# Patient Record
Sex: FEMALE | Race: WHITE | NOT HISPANIC OR LATINO | Employment: FULL TIME | URBAN - METROPOLITAN AREA
[De-identification: names, ages, dates, MRNs, and addresses within clinical notes are randomized per-mention and may not be internally consistent; named-entity substitution may affect disease eponyms.]

---

## 2017-06-03 RX ORDER — AZELASTINE HYDROCHLORIDE, FLUTICASONE PROPIONATE 137; 50 UG/1; UG/1
1 SPRAY, METERED NASAL 2 TIMES DAILY
COMMUNITY
Start: 2016-08-02 | End: 2018-05-18 | Stop reason: SDUPTHER

## 2017-06-03 RX ORDER — VENLAFAXINE HYDROCHLORIDE 150 MG/1
1 CAPSULE, EXTENDED RELEASE ORAL DAILY
COMMUNITY
Start: 2017-04-24 | End: 2017-06-05 | Stop reason: SDUPTHER

## 2017-06-03 RX ORDER — MONTELUKAST SODIUM 10 MG/1
1 TABLET ORAL DAILY
COMMUNITY
Start: 2017-03-23 | End: 2018-04-01 | Stop reason: SDUPTHER

## 2017-06-03 RX ORDER — VIT C/E/ZN/COPPR/LUTEIN/ZEAXAN 250MG-90MG
1 CAPSULE ORAL DAILY
COMMUNITY
End: 2021-10-13

## 2017-06-03 RX ORDER — LEVOTHYROXINE SODIUM 50 UG/1
1 TABLET ORAL DAILY
COMMUNITY
Start: 2017-04-19 | End: 2017-07-01 | Stop reason: SDUPTHER

## 2017-06-03 RX ORDER — LIOTHYRONINE SODIUM 25 UG/1
1 TABLET ORAL DAILY
COMMUNITY
Start: 2017-04-19 | End: 2017-06-22

## 2017-06-03 RX ORDER — VALACYCLOVIR HYDROCHLORIDE 500 MG/1
1 TABLET, FILM COATED ORAL
COMMUNITY
End: 2022-10-11 | Stop reason: SDUPTHER

## 2017-06-03 RX ORDER — ALBUTEROL SULFATE 90 UG/1
1 AEROSOL, METERED RESPIRATORY (INHALATION) EVERY 6 HOURS PRN
COMMUNITY
Start: 2015-06-30 | End: 2018-02-05 | Stop reason: SDUPTHER

## 2017-06-05 ENCOUNTER — CLINICAL SUPPORT (OUTPATIENT)
Dept: ALLERGY | Facility: CLINIC | Age: 47
End: 2017-06-05
Payer: COMMERCIAL

## 2017-06-05 ENCOUNTER — OFFICE VISIT (OUTPATIENT)
Dept: FAMILY MEDICINE | Facility: CLINIC | Age: 47
End: 2017-06-05
Payer: COMMERCIAL

## 2017-06-05 VITALS
SYSTOLIC BLOOD PRESSURE: 124 MMHG | HEART RATE: 90 BPM | OXYGEN SATURATION: 100 % | WEIGHT: 196 LBS | BODY MASS INDEX: 33.46 KG/M2 | HEIGHT: 64 IN | DIASTOLIC BLOOD PRESSURE: 80 MMHG

## 2017-06-05 VITALS
HEIGHT: 64 IN | BODY MASS INDEX: 33.48 KG/M2 | DIASTOLIC BLOOD PRESSURE: 84 MMHG | HEART RATE: 98 BPM | WEIGHT: 196.13 LBS | SYSTOLIC BLOOD PRESSURE: 118 MMHG

## 2017-06-05 DIAGNOSIS — J30.81 ALLERGIC RHINITIS DUE TO ANIMAL HAIR AND DANDER: Primary | ICD-10-CM

## 2017-06-05 DIAGNOSIS — E06.3 HASHIMOTO'S THYROIDITIS: ICD-10-CM

## 2017-06-05 DIAGNOSIS — F41.8 MIXED ANXIETY DEPRESSIVE DISORDER: Primary | ICD-10-CM

## 2017-06-05 PROBLEM — Z90.710 HISTORY OF HYSTERECTOMY: Status: ACTIVE | Noted: 2017-06-05

## 2017-06-05 PROBLEM — J45.909 ALLERGIC ASTHMA: Status: ACTIVE | Noted: 2017-06-05

## 2017-06-05 PROBLEM — J31.0 CHRONIC RHINITIS: Status: ACTIVE | Noted: 2017-06-05

## 2017-06-05 PROBLEM — E03.9 ACQUIRED HYPOTHYROIDISM: Status: RESOLVED | Noted: 2017-06-05 | Resolved: 2017-06-05

## 2017-06-05 PROBLEM — R68.82 REDUCED LIBIDO: Status: ACTIVE | Noted: 2017-06-05

## 2017-06-05 PROBLEM — J32.9 CHRONIC INFECTION OF SINUS: Status: ACTIVE | Noted: 2017-06-05

## 2017-06-05 PROBLEM — E78.5 HYPERLIPIDEMIA: Status: ACTIVE | Noted: 2017-06-05

## 2017-06-05 PROBLEM — E03.9 ACQUIRED HYPOTHYROIDISM: Status: ACTIVE | Noted: 2017-06-05

## 2017-06-05 PROBLEM — N20.0 CALCULUS OF KIDNEY: Status: ACTIVE | Noted: 2017-06-05

## 2017-06-05 PROBLEM — J45.20 INTERMITTENT ASTHMA: Status: ACTIVE | Noted: 2017-06-05

## 2017-06-05 PROBLEM — Z78.9 OTHER SPECIFIED HEALTH STATUS: Status: ACTIVE | Noted: 2017-06-05

## 2017-06-05 PROBLEM — J30.1 HAY FEVER: Status: ACTIVE | Noted: 2017-06-05

## 2017-06-05 PROCEDURE — 99499 UNLISTED E&M SERVICE: CPT | Mod: ,,, | Performed by: ALLERGY & IMMUNOLOGY

## 2017-06-05 PROCEDURE — 95117 IMMUNOTHERAPY INJECTIONS: CPT | Mod: ,,, | Performed by: ALLERGY & IMMUNOLOGY

## 2017-06-05 PROCEDURE — 99214 OFFICE O/P EST MOD 30 MIN: CPT | Mod: ,,, | Performed by: FAMILY MEDICINE

## 2017-06-05 RX ORDER — VENLAFAXINE HYDROCHLORIDE 150 MG/1
150 CAPSULE, EXTENDED RELEASE ORAL DAILY
Qty: 30 CAPSULE | Refills: 2 | Status: SHIPPED | OUTPATIENT
Start: 2017-06-05 | End: 2017-10-24 | Stop reason: SDUPTHER

## 2017-06-05 NOTE — PATIENT INSTRUCTIONS
Continue to take an antihistamine on the day of your allergy injection.      Please call if you have any concerns or questions.

## 2017-06-05 NOTE — PROGRESS NOTES
Pt presents for her monthly allergy immunothearpy. Tolerating injections. Perceives efficacy.     Rx Vial 1 0.5 mL 1:1 maintenance concentration  Rx Vial 2 0.5 mL 1:1 maintenance concentration        Pt observed in clinic for 30 mins and discharged in good condition.

## 2017-06-05 NOTE — PROGRESS NOTES
Subjective:       Patient ID:  Riddhi Thapa is a 46 y.o. female with multiple medical diagnoses as listed in the medical history and problem list that presents for Anxiety (f/u) and Thyroid Problem  .      Chief Complaint: Anxiety (f/u) and Thyroid Problem    Anxiety   Presents for follow-up visit. Symptoms include depressed mood, excessive worry and nervous/anxious behavior. Patient reports no chest pain, dizziness, nausea, shortness of breath or suicidal ideas. Symptoms occur most days. The severity of symptoms is mild. The quality of sleep is fair. Nighttime awakenings: one to two, occasional.     Compliance with medications is %.   Thyroid Problem   Presents for follow-up visit. Symptoms include anxiety and depressed mood. Patient reports no diarrhea. The symptoms have been stable.     Review of Systems   Constitutional: Negative for activity change, appetite change, chills and fever.   HENT: Negative.  Negative for facial swelling.    Eyes: Negative.  Negative for discharge.   Respiratory: Negative for apnea, cough and shortness of breath.    Cardiovascular: Negative.  Negative for chest pain.   Gastrointestinal: Negative for abdominal distention, diarrhea, nausea and vomiting.   Genitourinary: Negative for difficulty urinating and dyspareunia.   Musculoskeletal: Negative for arthralgias and myalgias.   Skin: Negative for color change and rash.   Neurological: Negative.  Negative for dizziness and headaches.   Psychiatric/Behavioral: Negative for agitation, dysphoric mood and suicidal ideas. The patient is nervous/anxious.        Past Medical History:   Diagnosis Date    Anxiety     Asthma     Depression     GERD (gastroesophageal reflux disease)     Hashimoto's thyroiditis     Hyperlipidemia     Hypothyroidism     Kidney stones      Past Surgical History:   Procedure Laterality Date    EXTRACORPOREAL SHOCK WAVE LITHOTRIPSY  2011    LITHOTRIPSY      SINUS SURGERY  2015    TUBAL LIGATION       Bi-Lat     Family History   Problem Relation Age of Onset    Eczema Mother     Thyroid disease Mother     Cancer Father      Renal    Eczema Father     COPD Maternal Grandmother     Diabetes Maternal Grandmother     Stroke Maternal Grandmother     Thyroid disease Maternal Grandmother     Asthma Neg Hx     Immunodeficiency Neg Hx     Rhinitis Neg Hx      Social History     Social History    Marital status:      Spouse name: N/A    Number of children: N/A    Years of education: N/A     Occupational History    Teacher      [employer]     Social History Main Topics    Smoking status: Never Smoker    Smokeless tobacco: Never Used    Alcohol use Yes      Comment: weekends only    Drug use: No    Sexual activity: Not Asked     Other Topics Concern    None     Social History Narrative    None     Current Outpatient Prescriptions   Medication Sig Dispense Refill    albuterol (PROAIR HFA) 90 mcg/actuation inhaler Inhale 1 Dose into the lungs every 6 (six) hours as needed.      azelastine-fluticasone (DYMISTA) 137-50 mcg/spray Spry nassal spray 1 spray by Nasal route 2 (two) times daily.      cetirizine (ZYRTEC) 10 mg Cap Take 1 capsule by mouth daily as needed.      cholecalciferol, vitamin D3, (VITAMIN D3) 1,000 unit capsule Take 1 capsule by mouth Daily.      dexlansoprazole (DEXILANT) 60 mg capsule Take 60 mg by mouth once daily.        levothyroxine (SYNTHROID) 50 MCG tablet Take 1 tablet by mouth Daily.      liothyronine (CYTOMEL) 25 MCG Tab Take 1 tablet by mouth Daily.      mometasone-formoterol (DULERA) 200-5 mcg/actuation inhaler Inhale 2 puffs into the lungs 2 (two) times daily as needed.      montelukast (SINGULAIR) 10 mg tablet Take 1 tablet by mouth Daily.      valacyclovir (VALTREX) 500 MG tablet Take 1 tablet by mouth as needed.      venlafaxine (EFFEXOR-XR) 150 MG Cp24 Take 1 capsule (150 mg total) by mouth Daily. 30 capsule 2     No current facility-administered  medications for this visit.      Review of patient's allergies indicates:   Allergen Reactions    Nsaids (non-steroidal anti-inflammatory drug)      causes upset stomach, has GERD     Objective:      Vitals:    06/05/17 1123   BP: 124/80   Pulse: 90     Physical Exam   Constitutional: She appears well-developed and well-nourished.   HENT:   Head: Normocephalic.   Eyes: Pupils are equal, round, and reactive to light. Right eye exhibits no discharge. Left eye exhibits no discharge. No scleral icterus.   Neck: No thyromegaly present.   Cardiovascular: Normal rate and regular rhythm.  Exam reveals no gallop and no friction rub.    No murmur heard.  Pulmonary/Chest: No respiratory distress. She has no wheezes. She has no rales. She exhibits no tenderness.   Abdominal: She exhibits no distension. There is no tenderness.   Musculoskeletal: She exhibits no tenderness.   Skin: No erythema.   Psychiatric: Her mood appears anxious.       Assessment:       1. Mixed anxiety depressive disorder    2. Hashimoto's thyroiditis        Plan:       Mixed anxiety depressive disorder  Comments:  refer to psychiatrist  Orders:  -     Ambulatory referral to Psychiatry  -     venlafaxine (EFFEXOR-XR) 150 MG Cp24; Take 1 capsule (150 mg total) by mouth Daily.  Dispense: 30 capsule; Refill: 2  -     TSH; Future; Expected date: 06/05/2017    Hashimoto's thyroiditis  -     TSH; Future; Expected date: 06/05/2017      Return in about 6 months (around 12/5/2017) for anxiety.      6/5/2017 Viri Wayne M.D.

## 2017-06-22 ENCOUNTER — TELEPHONE (OUTPATIENT)
Dept: FAMILY MEDICINE | Facility: CLINIC | Age: 47
End: 2017-06-22

## 2017-06-22 NOTE — TELEPHONE ENCOUNTER
----- Message from Viri Wayne MD sent at 6/22/2017  4:33 PM CDT -----  Please let pt know that her TSH low  ( 0.02) with normal T4.  Please advise pt to take half pill of 25 mcg of Cytomel ( not one pill) .  Thanks.  Repeat TSH in 2 months.

## 2017-07-03 RX ORDER — LEVOTHYROXINE SODIUM 50 UG/1
TABLET ORAL
Qty: 30 TABLET | Refills: 5 | Status: SHIPPED | OUTPATIENT
Start: 2017-07-03 | End: 2017-08-29 | Stop reason: SDUPTHER

## 2017-07-10 ENCOUNTER — CLINICAL SUPPORT (OUTPATIENT)
Dept: ALLERGY | Facility: CLINIC | Age: 47
End: 2017-07-10
Payer: COMMERCIAL

## 2017-07-10 VITALS — OXYGEN SATURATION: 97 % | SYSTOLIC BLOOD PRESSURE: 124 MMHG | HEART RATE: 90 BPM | DIASTOLIC BLOOD PRESSURE: 70 MMHG

## 2017-07-10 DIAGNOSIS — J45.40 ALLERGIC ASTHMA, MODERATE PERSISTENT, UNCOMPLICATED: ICD-10-CM

## 2017-07-10 DIAGNOSIS — J30.81 CHRONIC ALLERGIC RHINITIS DUE TO ANIMAL HAIR AND DANDER: Primary | ICD-10-CM

## 2017-07-10 PROCEDURE — 95117 IMMUNOTHERAPY INJECTIONS: CPT | Mod: ,,, | Performed by: ALLERGY & IMMUNOLOGY

## 2017-07-10 PROCEDURE — 99499 UNLISTED E&M SERVICE: CPT | Mod: ,,, | Performed by: ALLERGY & IMMUNOLOGY

## 2017-07-12 NOTE — PROGRESS NOTES
Immunotherapy Note: Allergy Shot      Subjective:       Patient ID: Riddhi Thapa is a 46 y.o. female presents for monthly immunotherapy  Tolerated last injection  No New Medications  Beta Blockers: not on beta blocker  Reaction with last injection?: No  If female, are you pregnant?: No  Are you on any beta blockers?: No  Has the vial ?: No    Are you ill today?: No  Asthma exacerbation or symptoms: No  Do you have a fever today?: No    Peak Flow: 500    Expiration Date #1: 17  Vial Concentration: 1:1 v/v (RED)  Dose (mL) #1: 0.5 mL  Location: Right upper arm  Given By: rw    Expiration Date #2: 17  Vial Concentration: 1:1 v/v (RED)  Dose (mL) #2: 0.5 mL  Location: Left upper arm  Given By : terry                         Objective:     Riddhi Thapa observed for 30 minutes and discharged in good condition        Discussion:     Pt understands the current recommendation. All questions answered to the best of my ability. Continue current management plan.          Electronically signed by Samara Jones    Allergy/Imunology  [facility]  [place] [address]  [place] [address]   Office [phone]

## 2017-08-08 ENCOUNTER — CLINICAL SUPPORT (OUTPATIENT)
Dept: ALLERGY | Facility: CLINIC | Age: 47
End: 2017-08-08
Payer: COMMERCIAL

## 2017-08-08 VITALS — DIASTOLIC BLOOD PRESSURE: 70 MMHG | SYSTOLIC BLOOD PRESSURE: 120 MMHG | HEART RATE: 86 BPM | OXYGEN SATURATION: 99 %

## 2017-08-08 DIAGNOSIS — J30.81 CHRONIC ALLERGIC RHINITIS DUE TO ANIMAL HAIR AND DANDER: Primary | ICD-10-CM

## 2017-08-08 PROCEDURE — 95117 IMMUNOTHERAPY INJECTIONS: CPT | Mod: ,,, | Performed by: ALLERGY & IMMUNOLOGY

## 2017-08-08 PROCEDURE — 99499 UNLISTED E&M SERVICE: CPT | Mod: ,,, | Performed by: ALLERGY & IMMUNOLOGY

## 2017-08-10 RX ORDER — LIOTHYRONINE SODIUM 25 UG/1
TABLET ORAL
Qty: 30 TABLET | Refills: 4 | Status: SHIPPED | OUTPATIENT
Start: 2017-08-10 | End: 2017-08-29 | Stop reason: DRUGHIGH

## 2017-08-23 LAB — TSH SERPL-ACNC: 0.22 MIU/L

## 2017-08-29 ENCOUNTER — OFFICE VISIT (OUTPATIENT)
Dept: FAMILY MEDICINE | Facility: CLINIC | Age: 47
End: 2017-08-29
Payer: COMMERCIAL

## 2017-08-29 VITALS
HEIGHT: 64 IN | WEIGHT: 197.38 LBS | TEMPERATURE: 98 F | RESPIRATION RATE: 20 BRPM | HEART RATE: 92 BPM | BODY MASS INDEX: 33.7 KG/M2 | OXYGEN SATURATION: 97 %

## 2017-08-29 DIAGNOSIS — E06.3 HASHIMOTO'S THYROIDITIS: Primary | ICD-10-CM

## 2017-08-29 PROCEDURE — 99214 OFFICE O/P EST MOD 30 MIN: CPT | Mod: ,,, | Performed by: NURSE PRACTITIONER

## 2017-08-29 PROCEDURE — 3008F BODY MASS INDEX DOCD: CPT | Mod: ,,, | Performed by: NURSE PRACTITIONER

## 2017-08-29 RX ORDER — LEVOTHYROXINE SODIUM 50 UG/1
50 TABLET ORAL
Qty: 30 TABLET | Refills: 5 | Status: SHIPPED | OUTPATIENT
Start: 2017-08-29 | End: 2018-02-27 | Stop reason: SDUPTHER

## 2017-08-29 NOTE — PROGRESS NOTES
Subjective:       Patient ID: Riddhi Thapa is a 47 y.o. female.    Chief Complaint: Medication Refill and Thyroid Problem    Thyroid Problem   Presents for follow-up visit. Symptoms include anxiety, dry skin, hair loss and weight gain. Patient reports no cold intolerance, constipation, diaphoresis, diarrhea, fatigue, heat intolerance or palpitations.     Review of Systems   Constitutional: Positive for weight gain. Negative for appetite change, chills, diaphoresis, fatigue and fever.   HENT: Negative for congestion, ear pain, hearing loss, sore throat and trouble swallowing.    Eyes: Negative for photophobia, pain and visual disturbance.   Respiratory: Negative for cough, chest tightness and shortness of breath.    Cardiovascular: Negative for chest pain, palpitations and leg swelling.   Gastrointestinal: Negative for abdominal pain, blood in stool, constipation, diarrhea, nausea and vomiting.   Endocrine: Negative for cold intolerance and heat intolerance.   Genitourinary: Negative for difficulty urinating, flank pain, pelvic pain and vaginal pain.   Musculoskeletal: Negative for arthralgias and myalgias.   Skin: Negative for rash.   Allergic/Immunologic: Negative for immunocompromised state.   Neurological: Negative for dizziness, weakness, light-headedness and headaches.   Hematological: Negative for adenopathy. Does not bruise/bleed easily.   Psychiatric/Behavioral: Negative for confusion, self-injury and suicidal ideas. The patient is nervous/anxious.        Past Medical History:   Diagnosis Date    Anxiety     Asthma     Depression     GERD (gastroesophageal reflux disease)     Hashimoto's thyroiditis     Hyperlipidemia     Hypothyroidism     Kidney stones        Past Surgical History:   Procedure Laterality Date    EXTRACORPOREAL SHOCK WAVE LITHOTRIPSY  2011    LITHOTRIPSY      SINUS SURGERY  2015    TUBAL LIGATION      Bi-Lat       Family History   Problem Relation Age of Onset    Eczema  Mother     Thyroid disease Mother     Cancer Father      Renal    Eczema Father     COPD Maternal Grandmother     Diabetes Maternal Grandmother     Stroke Maternal Grandmother     Thyroid disease Maternal Grandmother     Asthma Neg Hx     Immunodeficiency Neg Hx     Rhinitis Neg Hx        Social History     Social History    Marital status:      Spouse name: N/A    Number of children: N/A    Years of education: N/A     Occupational History    Teacher      [employer]     Social History Main Topics    Smoking status: Never Smoker    Smokeless tobacco: Never Used    Alcohol use Yes      Comment: weekends only    Drug use: No    Sexual activity: Not on file     Other Topics Concern    Not on file     Social History Narrative    No narrative on file       Current Outpatient Prescriptions   Medication Sig Dispense Refill    albuterol (PROAIR HFA) 90 mcg/actuation inhaler Inhale 1 Dose into the lungs every 6 (six) hours as needed.      azelastine-fluticasone (DYMISTA) 137-50 mcg/spray Spry nassal spray 1 spray by Nasal route 2 (two) times daily.      cetirizine (ZYRTEC) 10 mg Cap Take 1 capsule by mouth daily as needed.      cholecalciferol, vitamin D3, (VITAMIN D3) 1,000 unit capsule Take 1 capsule by mouth Daily.      dexlansoprazole (DEXILANT) 60 mg capsule Take 60 mg by mouth once daily.        levothyroxine (SYNTHROID) 50 MCG tablet Take 1 tablet (50 mcg total) by mouth before breakfast. 30 tablet 5    mometasone-formoterol (DULERA) 200-5 mcg/actuation inhaler Inhale 2 puffs into the lungs 2 (two) times daily as needed.      montelukast (SINGULAIR) 10 mg tablet Take 1 tablet by mouth Daily.      valacyclovir (VALTREX) 500 MG tablet Take 1 tablet by mouth as needed.      venlafaxine (EFFEXOR-XR) 150 MG Cp24 Take 1 capsule (150 mg total) by mouth Daily. 30 capsule 2     Current Facility-Administered Medications   Medication Dose Route Frequency Provider Last Rate Last Dose     Allergy Mix   Subcutaneous Q28 Days Samara Jones MD           Review of patient's allergies indicates:   Allergen Reactions    Nsaids (non-steroidal anti-inflammatory drug)      causes upset stomach, has GERD       Objective:      Physical Exam   Constitutional: She is oriented to person, place, and time. She appears well-developed and well-nourished. She is cooperative. No distress.   HENT:   Head: Normocephalic and atraumatic.   Right Ear: Tympanic membrane normal.   Left Ear: Tympanic membrane normal.   Eyes: Conjunctivae, EOM and lids are normal. Pupils are equal, round, and reactive to light. Lids are everted and swept, no foreign bodies found. Right pupil is round and reactive. Left pupil is round and reactive.   Neck: Trachea normal and normal range of motion. Neck supple. No JVD present. Carotid bruit is not present. No thyroid mass and no thyromegaly present.   Cardiovascular: Normal rate, regular rhythm, S1 normal, S2 normal, normal heart sounds and intact distal pulses.    Pulmonary/Chest: Breath sounds normal.   Abdominal: Soft. Bowel sounds are normal. There is no rigidity and no guarding.   Musculoskeletal: Normal range of motion.   Lymphadenopathy:     She has no cervical adenopathy.     She has no axillary adenopathy.   Neurological: She is alert and oriented to person, place, and time.   Skin: Skin is warm and dry.   Psychiatric: She has a normal mood and affect. Her behavior is normal. Judgment and thought content normal.   Nursing note and vitals reviewed.      Assessment:       1. Hashimoto's thyroiditis        Plan:     Hashimoto's thyroiditis - TSH 0.22.  Over medicated.  Stop Cytomel and continue Synthroid - repeat labs in 1 month.    -     TSH; Future; Expected date: 08/29/2017  -     T4, free; Future; Expected date: 08/29/2017  -     T3, free; Future; Expected date: 08/29/2017  -     levothyroxine (SYNTHROID) 50 MCG tablet; Take 1 tablet (50 mcg total) by mouth before breakfast.   Dispense: 30 tablet; Refill: 5   F/U as scheduled

## 2017-08-29 NOTE — PATIENT INSTRUCTIONS
Thyroid Antibody  Does this test have other names?  TPO Abs, Tg Abs, TSH-Rs Abs  What is this test?  This test measures the amount of thyroid antibodies in your blood. The test can help find out whether you have a problem with your thyroid.  Your thyroid is a butterfly-shaped gland near the base of your throat above your collarbone. The thyroid makes two hormones, T3 and T4. These hormones affect your energy levels, mood, weight, and other important aspects of your health.  In some people, the immune system makes antibodies that affect the thyroid gland, causing health problems. These antibodies may target:  · Thyroid peroxidase (TPO). This can lead to a condition called Hashimoto's thyroiditis. This condition causes an underactive thyroid (hypothyroidism).  · Thyroglobulin (Tg). This substance in the thyroid plays a role in T3 and T4 production. Almost everyone with Hashimoto's thyroiditis has high levels of antibodies against TPO and Tg.  · Thyroid-stimulating hormone (TSH) receptor. This can cause a condition called Graves disease, leading to overactive thyroid (hyperthyroidism).  Why do I need this test?  You might have this test if your healthcare provider suspects that you have Graves disease or Hashimoto's thyroiditis.  Symptoms of Graves disease include:  · Bulging eyes  · Eye symptoms like irritation, pressure, light sensitivity, double vision, and trouble moving eyes  · Irritability  · Nervousness  · Weight loss  · Extreme tiredness  · Muscle weakness  · Low tolerance for heat  · Trouble sleeping  · Tremors in the hands  · Irregular heartbeat  · Diarrhea  · Redness and swelling on shins  · Swollen thyroid, called goiter  Symptoms of Hashimoto's thyroiditis include:  · Goiter that shrinks over time  · Extreme tiredness  · Low tolerance for cold  · Weight gain  · Body and joint pain  · Hair loss  · Constipation  · Irregular menstrual periods  · Depression  · Memory problems  · Slower heart rate  What other  tests might I have along with this test?  Your healthcare provider may order other tests, including:  · Measurement of TSH, which is made by your pituitary gland. TSH regulates how much thyroid hormone your thyroid makes.  · Measurement of free T4  · Measurement of free T3  · Radioactive iodine uptake test and thyroid scan, for Graves disease  What do my test results mean?  Many things may affect your lab test results. These include the method each lab uses to do the test. Even if your test results are different from the normal value, you may not have a problem. To learn what the results mean for you, talk with your healthcare provider.  Normal results are negative, meaning that no antibodies against TPO, Tg, or TSH were found. You likely don't have a problem with your thyroid.  If your results show antibodies against TPO or Tg, it means you may have Hashimoto's thyroiditis.  If your results show antibodies against TSH receptor, it means you may have Graves disease.  People with type 1 diabetes or certain autoimmune diseases, and pregnant women are more likely to have antibodies against the thyroid.  How is this test done?  The test requires a blood sample, which is drawn through a needle from a vein in your arm.  Does this test pose any risks?  Taking a blood sample with a needle carries risks that include bleeding, infection, bruising, or feeling dizzy. When the needle pricks your arm, you may feel a slight stinging sensation or pain. Afterward, the site may be slightly sore.  What might affect my test results?  Certain medicines can affect your results. Being pregnant can affect your results.  How do I get ready for this test?  You don't need to prepare for this test. But be sure your healthcare provider knows about all medicines, herbs, vitamins, and supplements you are taking. This includes medicines that don't need a prescription and any illicit drugs you may use.      Date Last Reviewed: 11/4/2015  ©  3872-0439 [employer]. [address] [place] [address], [place] [address]. All rights reserved. This information is not intended as a substitute for professional medical care. Always follow your healthcare professional's instructions.

## 2017-09-27 ENCOUNTER — PROCEDURE VISIT (OUTPATIENT)
Dept: ALLERGY | Facility: CLINIC | Age: 47
End: 2017-09-27
Payer: COMMERCIAL

## 2017-09-27 VITALS
DIASTOLIC BLOOD PRESSURE: 82 MMHG | WEIGHT: 200.31 LBS | BODY MASS INDEX: 34.38 KG/M2 | SYSTOLIC BLOOD PRESSURE: 124 MMHG

## 2017-09-27 DIAGNOSIS — J30.81 CHRONIC ALLERGIC RHINITIS DUE TO ANIMAL HAIR AND DANDER: Primary | ICD-10-CM

## 2017-09-27 PROCEDURE — 95117 IMMUNOTHERAPY INJECTIONS: CPT | Mod: ,,, | Performed by: ALLERGY & IMMUNOLOGY

## 2017-10-10 LAB
T3FREE SERPL-MCNC: 2.3 PG/ML (ref 2.3–4.2)
T4 FREE SERPL-MCNC: 1 NG/DL (ref 0.8–1.8)
TSH SERPL-ACNC: 1.97 MIU/L

## 2017-10-11 ENCOUNTER — TELEPHONE (OUTPATIENT)
Dept: FAMILY MEDICINE | Facility: CLINIC | Age: 47
End: 2017-10-11

## 2017-10-11 DIAGNOSIS — E03.9 HYPOTHYROIDISM, UNSPECIFIED TYPE: Primary | ICD-10-CM

## 2017-10-11 NOTE — TELEPHONE ENCOUNTER
Pt wants a referral to Dr. Ron.  She will make an appt with Santo to get established with her as a patient later.

## 2017-10-24 DIAGNOSIS — F41.8 MIXED ANXIETY DEPRESSIVE DISORDER: ICD-10-CM

## 2017-10-24 RX ORDER — VENLAFAXINE HYDROCHLORIDE 150 MG/1
150 CAPSULE, EXTENDED RELEASE ORAL DAILY
Qty: 30 CAPSULE | Refills: 2 | Status: SHIPPED | OUTPATIENT
Start: 2017-10-24 | End: 2018-02-27 | Stop reason: SDUPTHER

## 2017-10-30 ENCOUNTER — CLINICAL SUPPORT (OUTPATIENT)
Dept: ALLERGY | Facility: CLINIC | Age: 47
End: 2017-10-30
Payer: COMMERCIAL

## 2017-10-30 VITALS — SYSTOLIC BLOOD PRESSURE: 128 MMHG | DIASTOLIC BLOOD PRESSURE: 80 MMHG

## 2017-10-30 DIAGNOSIS — J30.81 CHRONIC ALLERGIC RHINITIS DUE TO ANIMAL HAIR AND DANDER: Primary | ICD-10-CM

## 2017-10-30 PROCEDURE — 99499 UNLISTED E&M SERVICE: CPT | Mod: ,,, | Performed by: ALLERGY & IMMUNOLOGY

## 2017-10-30 PROCEDURE — 95117 IMMUNOTHERAPY INJECTIONS: CPT | Mod: ,,, | Performed by: ALLERGY & IMMUNOLOGY

## 2017-10-31 NOTE — PROGRESS NOTES
Immunotherapy Note: Allergy Shot      Subjective:       Patient ID: Riddhi Thapa is a 47 y.o. female presents for monthly immunotherapy  Tolerated last injection  No New Medications  Beta Blockers: not on beta blocker    Reaction with last injection?: No  If female, are you pregnant?: No  Are you on any beta blockers?: No  Has the vial ?: No    Are you ill today?: No  Asthma exacerbation or symptoms: No  Do you have a fever today?: No    Peak Flow: 470    Expiration Date #1: 18  Vial Concentration: 1:1 v/v (RED)  Dose (mL) #1: 0.5 mL  Location: Right upper arm  Given By: rw    Expiration Date #2: 18  Vial Concentration: 1:1 v/v (RED)  Dose (mL) #2: 0.5 mL  Location: Left upper arm  Given By : rw                           Objective:     Riddhi Thapa observed for 30 minutes and discharged in good condition        Discussion:     Pt understands the current recommendation. All questions answered to the best of my ability. Continue current management plan.      Electronically signed by Samara Jones    Allergy/Imunology  [facility]  [place] [address] [address]  [place] [address]   Office [phone]

## 2017-11-14 NOTE — PROCEDURES
Procedures     Immunotherapy Note: Allergy Shot      Subjective:       Patient ID: Riddhi Thapa is a 47 y.o. female presents for monthly immunotherapy  Tolerated last injection  No New Medications  Beta Blockers: not on beta blocker    Reaction with last injection?: No  If female, are you pregnant?: No  Are you on any beta blockers?: No  Has the vial ?: No    Are you ill today?: No  Asthma exacerbation or symptoms: No  Do you have a fever today?: No    Peak Flow: 410    Expiration Date #1: 18  Vial Concentration: 1:1 v/v (RED)  Dose (mL) #1: 0.5 mL  Location: Right upper arm  Given By: sd    Expiration Date #2: 18  Vial Concentration: 1:1 v/v (RED)  Dose (mL) #2: 0.5 mL  Location: Left upper arm  Given By : sd                           Objective:     Riddhi Thapa observed for 30 minutes and discharged in good condition        Discussion:     Pt understands the current recommendation. All questions answered to the best of my ability. Continue current management plan.      Electronically signed by Samara Jones    Allergy/Imunology  [facility]  [place] [address] [facility]  [place] [address]   Office [phone]

## 2017-12-19 ENCOUNTER — TELEPHONE (OUTPATIENT)
Dept: PULMONOLOGY | Facility: CLINIC | Age: 47
End: 2017-12-19

## 2017-12-19 NOTE — TELEPHONE ENCOUNTER
Son tested + for influenza B this morning at pediatrician. Asking if she should take prophylactic Tamiflu. Dr Jones agreed, Tamiflu 75mg #10 1po qd phoned to [facility]

## 2018-01-11 ENCOUNTER — CLINICAL SUPPORT (OUTPATIENT)
Dept: ALLERGY | Facility: CLINIC | Age: 48
End: 2018-01-11
Payer: COMMERCIAL

## 2018-01-11 VITALS
WEIGHT: 200.38 LBS | BODY MASS INDEX: 34.21 KG/M2 | DIASTOLIC BLOOD PRESSURE: 78 MMHG | SYSTOLIC BLOOD PRESSURE: 116 MMHG | HEIGHT: 64 IN

## 2018-01-11 DIAGNOSIS — J30.81 CHRONIC ALLERGIC RHINITIS DUE TO ANIMAL HAIR AND DANDER: Primary | ICD-10-CM

## 2018-01-11 PROCEDURE — 99499 UNLISTED E&M SERVICE: CPT | Mod: ,,, | Performed by: ALLERGY & IMMUNOLOGY

## 2018-01-11 PROCEDURE — 95117 IMMUNOTHERAPY INJECTIONS: CPT | Mod: ,,, | Performed by: ALLERGY & IMMUNOLOGY

## 2018-01-11 NOTE — PROGRESS NOTES
Immunotherapy Note: Allergy Shot      Subjective:       Patient ID: Riddhi Thapa is a 47 y.o. female presents for monthly immunotherapy  Tolerated last injection  No New Medications  Beta Blockers: not on beta blocker    Reaction with last injection?: No  If female, are you pregnant?: No  Are you on any beta blockers?: No  Has the vial ?: No    Are you ill today?: No  Asthma exacerbation or symptoms: No  Do you have a fever today?: No    Peak Flow: 450    Expiration Date #1: 18  Vial Concentration: 1:1 v/v (RED)  Dose (mL) #1: 0.5 mL  Location: Right upper arm  Given By: NATAN    Expiration Date #2: 18  Vial Concentration: 1:1 v/v (RED)  Dose (mL) #2: 0.5 mL  Location: Left upper arm  Given By : NATAN                           Objective:     Riddhi Thapa observed for 30 minutes and discharged in good condition        Discussion:     Pt understands the current recommendation. All questions answered to the best of my ability. Continue current management plan.      Electronically signed by Samara Jones    Allergy/Imunology  [facility]  [place] [address] [facility]  [place] [address]   Office [phone]

## 2018-01-22 ENCOUNTER — TELEPHONE (OUTPATIENT)
Dept: FAMILY MEDICINE | Facility: CLINIC | Age: 48
End: 2018-01-22

## 2018-01-22 DIAGNOSIS — E06.3 HASHIMOTO'S THYROIDITIS: ICD-10-CM

## 2018-01-22 DIAGNOSIS — K21.9 GASTROESOPHAGEAL REFLUX DISEASE, ESOPHAGITIS PRESENCE NOT SPECIFIED: ICD-10-CM

## 2018-01-22 DIAGNOSIS — F41.8 MIXED ANXIETY DEPRESSIVE DISORDER: Primary | ICD-10-CM

## 2018-01-22 DIAGNOSIS — E78.5 HYPERLIPIDEMIA, UNSPECIFIED HYPERLIPIDEMIA TYPE: ICD-10-CM

## 2018-01-22 NOTE — TELEPHONE ENCOUNTER
----- Message from JEANNE Reid sent at 1/22/2018 10:12 AM CST -----  Regarding: Labs  I can order.  Where would she like these sent? [facility]?      ----- Message -----  From: Claribel Waggoner LPN  Sent: 1/22/2018  10:01 AM  To: JEANNE Reid        ----- Message -----  From: Carol Ha  Sent: 1/22/2018   9:43 AM  To: Kate [facility]ura Staff    Patient states she is former [facility] patient and wants to know when she comes to see Sv[facility] should she do lab work before? Please call to advise.

## 2018-02-05 RX ORDER — MOMETASONE FUROATE AND FORMOTEROL FUMARATE DIHYDRATE 200; 5 UG/1; UG/1
AEROSOL RESPIRATORY (INHALATION)
Qty: 13 G | Refills: 10 | Status: SHIPPED | OUTPATIENT
Start: 2018-02-05 | End: 2018-02-27

## 2018-02-05 RX ORDER — ALBUTEROL SULFATE 90 UG/1
2 AEROSOL, METERED RESPIRATORY (INHALATION) EVERY 6 HOURS PRN
Qty: 8 G | Refills: 2 | Status: SHIPPED | OUTPATIENT
Start: 2018-02-05 | End: 2019-07-30 | Stop reason: SDUPTHER

## 2018-02-08 ENCOUNTER — CLINICAL SUPPORT (OUTPATIENT)
Dept: ALLERGY | Facility: CLINIC | Age: 48
End: 2018-02-08
Payer: COMMERCIAL

## 2018-02-08 VITALS — DIASTOLIC BLOOD PRESSURE: 78 MMHG | SYSTOLIC BLOOD PRESSURE: 118 MMHG

## 2018-02-08 DIAGNOSIS — J30.81 CHRONIC ALLERGIC RHINITIS DUE TO ANIMAL HAIR AND DANDER: Primary | ICD-10-CM

## 2018-02-08 PROCEDURE — 95117 IMMUNOTHERAPY INJECTIONS: CPT | Mod: ,,, | Performed by: ALLERGY & IMMUNOLOGY

## 2018-02-08 PROCEDURE — 99499 UNLISTED E&M SERVICE: CPT | Mod: ,,, | Performed by: ALLERGY & IMMUNOLOGY

## 2018-02-08 NOTE — PROGRESS NOTES
Immunotherapy Note: Allergy Shot      Subjective:       Patient ID: Riddhi Thapa is a 47 y.o. female presents for monthly immunotherapy  Tolerated last injection  No New Medications  Beta Blockers: not on beta blocker           Reaction with last injection?: No  If female, are you pregnant?: No  Are you on any beta blockers?: No  Has the vial ?: No     Are you ill today?: No  Asthma exacerbation or symptoms: No  Do you have a fever today?: No     Peak Flow: 450     Expiration Date #1: 18  Vial Concentration: 1:1 v/v (RED)  Dose (mL) #1: 0.5 mL  Location: Right upper arm  Given By: NATAN     Expiration Date #2: 18  Vial Concentration: 1:1 v/v (RED)  Dose (mL) #2: 0.5 mL  Location: Left upper arm  Given By : NATAN                                          Objective:     Riddhi Thapa observed for 30 minutes and discharged in good condition        Discussion:     Pt understands the current recommendation. All questions answered to the best of my ability. Continue current management plan.      Electronically signed by Samara Jones    Allergy/Imunology  [facility]  [place] [address] [address] [facility]. [address]  [place] [address]   Office [phone]

## 2018-02-17 LAB
ALBUMIN SERPL-MCNC: 4.4 G/DL (ref 3.6–5.1)
ALBUMIN/GLOB SERPL: 1.6 (CALC) (ref 1–2.5)
ALP SERPL-CCNC: 89 U/L (ref 33–115)
ALT SERPL-CCNC: 14 U/L (ref 6–29)
AST SERPL-CCNC: 14 U/L (ref 10–35)
BASOPHILS # BLD AUTO: 52 CELLS/UL (ref 0–200)
BASOPHILS NFR BLD AUTO: 0.6 %
BILIRUB SERPL-MCNC: 0.5 MG/DL (ref 0.2–1.2)
BUN SERPL-MCNC: 11 MG/DL (ref 7–25)
BUN/CREAT SERPL: NORMAL (CALC) (ref 6–22)
CALCIUM SERPL-MCNC: 9.3 MG/DL (ref 8.6–10.2)
CHLORIDE SERPL-SCNC: 102 MMOL/L (ref 98–110)
CHOLEST SERPL-MCNC: 216 MG/DL
CHOLEST/HDLC SERPL: 3.3 (CALC)
CO2 SERPL-SCNC: 27 MMOL/L (ref 20–31)
CREAT SERPL-MCNC: 0.78 MG/DL (ref 0.5–1.1)
EOSINOPHIL # BLD AUTO: 301 CELLS/UL (ref 15–500)
EOSINOPHIL NFR BLD AUTO: 3.5 %
ERYTHROCYTE [DISTWIDTH] IN BLOOD BY AUTOMATED COUNT: 12.1 % (ref 11–15)
GFR SERPL CREATININE-BSD FRML MDRD: 92 ML/MIN/1.73M2
GLOBULIN SER CALC-MCNC: 2.7 G/DL (CALC) (ref 1.9–3.7)
GLUCOSE SERPL-MCNC: 89 MG/DL (ref 65–99)
HCT VFR BLD AUTO: 43 % (ref 35–45)
HDLC SERPL-MCNC: 65 MG/DL
HGB BLD-MCNC: 14.2 G/DL (ref 11.7–15.5)
LDLC SERPL CALC-MCNC: 124 MG/DL (CALC)
LYMPHOCYTES # BLD AUTO: 2460 CELLS/UL (ref 850–3900)
LYMPHOCYTES NFR BLD AUTO: 28.6 %
MCH RBC QN AUTO: 31.1 PG (ref 27–33)
MCHC RBC AUTO-ENTMCNC: 33 G/DL (ref 32–36)
MCV RBC AUTO: 94.1 FL (ref 80–100)
MONOCYTES # BLD AUTO: 550 CELLS/UL (ref 200–950)
MONOCYTES NFR BLD AUTO: 6.4 %
NEUTROPHILS # BLD AUTO: 5237 CELLS/UL (ref 1500–7800)
NEUTROPHILS NFR BLD AUTO: 60.9 %
NONHDLC SERPL-MCNC: 151 MG/DL (CALC)
PLATELET # BLD AUTO: 320 THOUSAND/UL (ref 140–400)
PMV BLD REES-ECKER: 10.3 FL (ref 7.5–12.5)
POTASSIUM SERPL-SCNC: 4.3 MMOL/L (ref 3.5–5.3)
PROT SERPL-MCNC: 7.1 G/DL (ref 6.1–8.1)
RBC # BLD AUTO: 4.57 MILLION/UL (ref 3.8–5.1)
SODIUM SERPL-SCNC: 139 MMOL/L (ref 135–146)
T3FREE SERPL-MCNC: 2.7 PG/ML (ref 2.3–4.2)
T4 FREE SERPL-MCNC: 1.2 NG/DL (ref 0.8–1.8)
TRIGL SERPL-MCNC: 153 MG/DL
TSH SERPL-ACNC: 1.49 MIU/L
WBC # BLD AUTO: 8.6 THOUSAND/UL (ref 3.8–10.8)

## 2018-02-27 ENCOUNTER — OFFICE VISIT (OUTPATIENT)
Dept: FAMILY MEDICINE | Facility: CLINIC | Age: 48
End: 2018-02-27
Payer: COMMERCIAL

## 2018-02-27 VITALS
BODY MASS INDEX: 35 KG/M2 | DIASTOLIC BLOOD PRESSURE: 64 MMHG | HEART RATE: 79 BPM | SYSTOLIC BLOOD PRESSURE: 110 MMHG | HEIGHT: 64 IN | WEIGHT: 205 LBS | OXYGEN SATURATION: 100 %

## 2018-02-27 DIAGNOSIS — F41.8 MIXED ANXIETY DEPRESSIVE DISORDER: ICD-10-CM

## 2018-02-27 DIAGNOSIS — Z23 INFLUENZA VACCINATION ADMINISTERED AT CURRENT VISIT: ICD-10-CM

## 2018-02-27 DIAGNOSIS — Z23 NEED FOR TDAP VACCINATION: ICD-10-CM

## 2018-02-27 DIAGNOSIS — E06.3 HASHIMOTO'S THYROIDITIS: Primary | ICD-10-CM

## 2018-02-27 DIAGNOSIS — Z13.220 NEED FOR LIPID SCREENING: ICD-10-CM

## 2018-02-27 PROCEDURE — 90472 IMMUNIZATION ADMIN EACH ADD: CPT | Mod: ,,, | Performed by: NURSE PRACTITIONER

## 2018-02-27 PROCEDURE — 90715 TDAP VACCINE 7 YRS/> IM: CPT | Mod: ,,, | Performed by: NURSE PRACTITIONER

## 2018-02-27 PROCEDURE — 3008F BODY MASS INDEX DOCD: CPT | Mod: ,,, | Performed by: NURSE PRACTITIONER

## 2018-02-27 PROCEDURE — 90686 IIV4 VACC NO PRSV 0.5 ML IM: CPT | Mod: ,,, | Performed by: NURSE PRACTITIONER

## 2018-02-27 PROCEDURE — 99213 OFFICE O/P EST LOW 20 MIN: CPT | Mod: 25,,, | Performed by: NURSE PRACTITIONER

## 2018-02-27 PROCEDURE — 90471 IMMUNIZATION ADMIN: CPT | Mod: ,,, | Performed by: NURSE PRACTITIONER

## 2018-02-27 RX ORDER — LEVOTHYROXINE SODIUM 50 UG/1
50 TABLET ORAL
Qty: 30 TABLET | Refills: 5 | Status: SHIPPED | OUTPATIENT
Start: 2018-02-27 | End: 2018-07-12 | Stop reason: SDUPTHER

## 2018-02-27 RX ORDER — VENLAFAXINE HYDROCHLORIDE 150 MG/1
150 CAPSULE, EXTENDED RELEASE ORAL DAILY
Qty: 30 CAPSULE | Refills: 5 | Status: SHIPPED | OUTPATIENT
Start: 2018-02-27 | End: 2018-08-24 | Stop reason: SDUPTHER

## 2018-02-27 NOTE — PATIENT INSTRUCTIONS
Anxiety Reaction  Anxiety is the feeling we all get when we think something bad might happen. It is a normal response to stress and usually causes only a mild reaction. When anxiety becomes more severe, it can interfere with daily life. In some cases, you may not even be aware of what it is youre anxious about. There may also be a genetic link or it may be a learned behavior in the home.  Both psychological and physical triggers cause stress reaction. It's often a response to fear or emotional stress, real or imagined. This stress may come from home, family, work, or social relationships.  During an anxiety reaction, you may feel:  · Helpless  · Nervous  · Depressed  · Irritable  Your body may show signs of anxiety in many ways. You may experience:  · Dry mouth  · Shakiness  · Dizziness  · Weakness  · Trouble breathing  · Breathing fast (hyperventilating)  · Chest pressure  · Sweating  · Headache  · Nausea  · Diarrhea  · Tiredness  · Inability to sleep  · Sexual problems  Home care  · Try to locate the sources of stress in your life. They may not be obvious. These may include:  ¨ Daily hassles of life (traffic jams, missed appointments, car troubles, etc.)  ¨ Major life changes, both good (new baby, job promotion) and bad (loss of job, loss of loved one)  ¨ Overload: feeling that you have too many responsibilities and can't take care of all of them at once  ¨ Feeling helpless, feeling that your problems are beyond what youre able to solve  · Notice how your body reacts to stress. Learn to listen to your body signals. This will help you take action before the stress becomes severe.  · When you can, do something about the source of your stress. (Avoid hassles, limit the amount of change that happens in your life at one time and take a break when you feel overloaded).  · Unfortunately, many stressful situations can't be avoided. It is necessary to learn how to better manage stress. There are many proven methods  that will reduce your anxiety. These include simple things like exercise, good nutrition and adequate rest. Also, there are certain techniques that are helpful:  ¨ Relaxation  ¨ Breathing exercises  ¨ Visualization  ¨ Biofeedback  ¨ Meditation  For more information about this, consult your doctor or go to a local bookstore and review the many books and tapes available on this subject.  Follow-up care  If you feel that your anxiety is not responding to self-help measures, contact your doctor or make an appointment with a counselor. You may need short-term psychological counseling and temporary medicine to help you manage stress.  Call 911  Call your healthcare provider right away if any of these occur:  · Trouble breathing  · Confusion  · Drowsiness or trouble wakening  · Fainting or loss of consciousness  · Rapid heart rate  · Seizure  · New chest pain that becomes more severe, lasts longer, or spreads into your shoulder, arm, neck, jaw, or back  When to seek medical advice  Call your healthcare provider right away if any of these occur:  · Your symptoms get worse  · Severe headache not relieved by rest and mild pain reliever  Date Last Reviewed: 9/29/2015  © 5932-3347 [employer]. [address] [place] [address]. All rights reserved. This information is not intended as a substitute for professional medical care. Always follow your healthcare professional's instructions.        Medicines for Acid Reflux  Your healthcare provider has told you that you have acid reflux. This condition causes stomach acid to wash up into your throat. For most people, acid reflux is troubling but not dangerous. But left untreated, acid reflux sometimes damages the esophagus. Medicines can help control acid reflux and limit your risk of future problems.  Medicines for acid reflux  Your healthcare provider may prescribe medicine to help treat your acid reflux. Medicine will be based on your symptoms and any test  results. Your provider will explain how to take your medicine. You will also be told about possible side effects.  Reducing stomach acid  Your provider may suggest antacids that you can buy over the counter. Antacids can give fast relief. Or you may be told to take a type of medicine called H2 blockers. These are available over the counter and by prescription (for higher doses).  Blocking stomach acid  In more severe cases, your healthcare provider may suggest stronger medicines such as proton pump inhibitors (PPIs). These keep the stomach from making acid. They are often prescribed for long-term use.  Other medicines  In some cases medicines to reduce or block stomach acid may not work. Then you may be switched to another type of medicine that helps your stomach empty better.     Date Last Reviewed: 10/1/2016  © 9254-4326 [employer]. [address] [place] [address], [place] [address]. All rights reserved. This information is not intended as a substitute for professional medical care. Always follow your healthcare professional's instructions.

## 2018-02-27 NOTE — PROGRESS NOTES
Subjective:       Patient ID: Riddhi Thapa is a 47 y.o. female.    Chief Complaint: Thyroid Problem and Medication Refill    Patient presents for check up on thyroid and lab review.  Patient was not fasting for her labs and states she believes that is the reason her cholesterol was elevated.  Patient states she has no history of hyperlipidemia.        Thyroid Problem   Presents for follow-up visit. Symptoms include anxiety. Patient reports no cold intolerance, constipation, depressed mood, diarrhea, dry skin, hair loss, heat intolerance, hoarse voice, leg swelling, menstrual problem, nail problem, palpitations, tremors, weight gain or weight loss. The symptoms have been stable.   Anxiety   Presents for follow-up visit. Symptoms include excessive worry, irritability and nervous/anxious behavior. Patient reports no chest pain, compulsions, confusion, decreased concentration, depressed mood, dizziness, dry mouth, feeling of choking, hyperventilation, impotence, insomnia, malaise, muscle tension, nausea, obsessions, palpitations, panic, restlessness, shortness of breath or suicidal ideas. Symptoms occur occasionally. The severity of symptoms is moderate. The quality of sleep is fair. Nighttime awakenings: occasional.     Compliance with medications is %.     Review of Systems   Constitutional: Positive for irritability. Negative for activity change, appetite change, fever, weight gain and weight loss.   HENT: Negative for congestion, ear discharge, ear pain, hoarse voice, sore throat, trouble swallowing and voice change.    Eyes: Negative for photophobia, pain, discharge and visual disturbance.   Respiratory: Negative for cough, chest tightness and shortness of breath.    Cardiovascular: Negative for chest pain, palpitations and leg swelling.   Gastrointestinal: Negative for abdominal pain, constipation, diarrhea, nausea and vomiting.   Endocrine: Negative for cold intolerance and heat intolerance.         Hypothyroid   Genitourinary: Negative for difficulty urinating, dysuria, impotence and menstrual problem.   Musculoskeletal: Negative for arthralgias and gait problem.   Skin: Negative for rash.   Allergic/Immunologic: Negative for immunocompromised state.   Neurological: Negative for dizziness, tremors, speech difficulty and headaches.   Psychiatric/Behavioral: Negative for agitation, confusion, decreased concentration, self-injury and suicidal ideas. The patient is nervous/anxious. The patient does not have insomnia.        Past Medical History:   Diagnosis Date    Anxiety     Asthma     Depression     GERD (gastroesophageal reflux disease)     Hashimoto's thyroiditis     Hyperlipidemia     Hypothyroidism     Kidney stones       Past Surgical History:   Procedure Laterality Date    EXTRACORPOREAL SHOCK WAVE LITHOTRIPSY  2011    LITHOTRIPSY      SINUS SURGERY  2015    TUBAL LIGATION      Bi-Lat       Family History   Problem Relation Age of Onset    Eczema Mother     Thyroid disease Mother     Cancer Father      Renal    Eczema Father     COPD Maternal Grandmother     Diabetes Maternal Grandmother     Stroke Maternal Grandmother     Thyroid disease Maternal Grandmother     Asthma Neg Hx     Immunodeficiency Neg Hx     Rhinitis Neg Hx        Social History     Social History    Marital status:      Spouse name: N/A    Number of children: N/A    Years of education: N/A     Occupational History    Teacher      [employer]     Social History Main Topics    Smoking status: Never Smoker    Smokeless tobacco: Never Used    Alcohol use Yes      Comment: weekends only    Drug use: No    Sexual activity: Not Asked     Other Topics Concern    None     Social History Narrative    None       Current Outpatient Prescriptions   Medication Sig Dispense Refill    albuterol (PROAIR HFA) 90 mcg/actuation inhaler Inhale 2 puffs into the lungs every 6 (six) hours as needed for Wheezing. 8 g 2  "   azelastine-fluticasone (DYMISTA) 137-50 mcg/spray Spry nassal spray 1 spray by Nasal route 2 (two) times daily.      cetirizine (ZYRTEC) 10 mg Cap Take 1 capsule by mouth daily as needed.      cholecalciferol, vitamin D3, (VITAMIN D3) 1,000 unit capsule Take 1 capsule by mouth Daily.      dexlansoprazole (DEXILANT) 60 mg capsule Take 60 mg by mouth once daily.        levothyroxine (SYNTHROID) 50 MCG tablet Take 1 tablet (50 mcg total) by mouth before breakfast. 30 tablet 5    montelukast (SINGULAIR) 10 mg tablet Take 1 tablet by mouth Daily.      valacyclovir (VALTREX) 500 MG tablet Take 1 tablet by mouth as needed.      venlafaxine (EFFEXOR-XR) 150 MG Cp24 Take 1 capsule (150 mg total) by mouth Daily. 30 capsule 5     Current Facility-Administered Medications   Medication Dose Route Frequency Provider Last Rate Last Dose    Allergy Mix   Subcutaneous Q28 Days Samara Jones MD           Review of patient's allergies indicates:   Allergen Reactions    Nsaids (non-steroidal anti-inflammatory drug)      causes upset stomach, has GERD     Objective:      Blood pressure 110/64, pulse 79, height 5' 4" (1.626 m), weight 93 kg (205 lb), SpO2 100 %. Body mass index is 35.19 kg/m².   Physical Exam   Constitutional: She is oriented to person, place, and time. She appears well-developed and well-nourished. She is cooperative. No distress.   HENT:   Head: Normocephalic and atraumatic.   Right Ear: Tympanic membrane normal.   Left Ear: Tympanic membrane normal.   Nose: Nose normal.   Mouth/Throat: Oropharynx is clear and moist.   Eyes: Conjunctivae, EOM and lids are normal. Pupils are equal, round, and reactive to light. Lids are everted and swept, no foreign bodies found. Right pupil is round and reactive. Left pupil is round and reactive.   Neck: Trachea normal and normal range of motion. Neck supple. No thyromegaly present.   Cardiovascular: Normal rate, regular rhythm, S1 normal, S2 normal, normal heart " sounds and intact distal pulses.    Pulmonary/Chest: Effort normal and breath sounds normal. No respiratory distress.   Abdominal: Soft. There is no rigidity.   Musculoskeletal: Normal range of motion.   Lymphadenopathy:     She has no cervical adenopathy.     She has no axillary adenopathy.   Neurological: She is alert and oriented to person, place, and time.   Skin: Skin is warm and dry. Capillary refill takes less than 2 seconds.   Psychiatric: Her speech is normal and behavior is normal. Judgment and thought content normal. Her mood appears anxious (controlled with effexor). Cognition and memory are normal. She does not exhibit a depressed mood.   Nursing note and vitals reviewed.          Assessment:       1. Hashimoto's thyroiditis    2. Mixed anxiety depressive disorder    3. Influenza vaccination administered at current visit    4. Need for Tdap vaccination    5. Need for lipid screening        Plan:       Riddhi was seen today for thyroid problem and medication refill.    Diagnoses and all orders for this visit:    Hashimoto's thyroiditis  -     levothyroxine (SYNTHROID) 50 MCG tablet; Take 1 tablet (50 mcg total) by mouth before breakfast.    Mixed anxiety depressive disorder  -     venlafaxine (EFFEXOR-XR) 150 MG Cp24; Take 1 capsule (150 mg total) by mouth Daily.    Influenza vaccination administered at current visit  -     Influenza - Quadrivalent (3 years & older) (PF)    Need for Tdap vaccination  -     Tdap Vaccine    Need for lipid screening  -     Lipid panel; Future  -     Lipid panel       Labs reviewed at today's office visit.  Repeat lipids in 3 months.  Will call with labs.  If lab is normal will need office visit for cholesterol.  Otherwise, follow up in 6 months for thyroid and anxiety.

## 2018-03-08 ENCOUNTER — CLINICAL SUPPORT (OUTPATIENT)
Dept: ALLERGY | Facility: CLINIC | Age: 48
End: 2018-03-08
Payer: COMMERCIAL

## 2018-03-08 ENCOUNTER — DOCUMENTATION ONLY (OUTPATIENT)
Dept: ALLERGY | Facility: CLINIC | Age: 48
End: 2018-03-08

## 2018-03-08 VITALS
WEIGHT: 200 LBS | SYSTOLIC BLOOD PRESSURE: 124 MMHG | DIASTOLIC BLOOD PRESSURE: 78 MMHG | HEIGHT: 64 IN | BODY MASS INDEX: 34.15 KG/M2

## 2018-03-08 DIAGNOSIS — J30.81 CHRONIC ALLERGIC RHINITIS DUE TO ANIMAL HAIR AND DANDER: Primary | ICD-10-CM

## 2018-03-08 PROCEDURE — 95117 IMMUNOTHERAPY INJECTIONS: CPT | Mod: ,,, | Performed by: ALLERGY & IMMUNOLOGY

## 2018-03-08 PROCEDURE — 99499 UNLISTED E&M SERVICE: CPT | Mod: ,,, | Performed by: ALLERGY & IMMUNOLOGY

## 2018-03-08 NOTE — PROGRESS NOTES
Immunotherapy Note: Allergy Shot      Subjective:       Patient ID: Riddhi Thapa is a 47 y.o. female presents for monthly immunotherapy  Tolerated last injection  No New Medications  Beta Blockers: not on beta blocker    Reaction with last injection?: No  If female, are you pregnant?: No  Are you on any beta blockers?: No  Has the vial ?: No    Are you ill today?: No  Asthma exacerbation or symptoms: No  Do you have a fever today?: No    Peak Flow: 470    Expiration Date #1: 19  Vial Concentration: 1:1 v/v (RED)  Dose (mL) #1: 0.4 mL  Location: Right upper arm  Given By: NATAN    Expiration Date #2: 18  Vial Concentration: 1:1 v/v (RED)  Dose (mL) #2: 0.5 mL  Location: Left upper arm  Given By : NATAN                           Objective:     Riddhi Thapa observed for 30 minutes and discharged in good condition        Discussion:     Pt understands the current recommendation. All questions answered to the best of my ability. Continue current management plan.      Electronically signed by Samara Jones    Allergy/Imunology  [facility]  [place] [address] [address] [facility]  [place] [address]   Office [phone]

## 2018-03-08 NOTE — PROGRESS NOTES
Using 70% alcohol clean surface was prepped. Sterile drape then covered clean space.  Hands were scrubbed and placed into sterile gloves. Gown, mask, and hair net worn according to  guidelines allergist exemption.    Mixed extracts march 7th.    Vial 1 and 2 refill      20 units

## 2018-04-02 RX ORDER — MONTELUKAST SODIUM 10 MG/1
TABLET ORAL
Qty: 30 TABLET | Refills: 10 | Status: SHIPPED | OUTPATIENT
Start: 2018-04-02 | End: 2018-05-18 | Stop reason: SDUPTHER

## 2018-04-05 ENCOUNTER — CLINICAL SUPPORT (OUTPATIENT)
Dept: ALLERGY | Facility: CLINIC | Age: 48
End: 2018-04-05
Payer: COMMERCIAL

## 2018-04-05 VITALS
HEART RATE: 89 BPM | WEIGHT: 200 LBS | SYSTOLIC BLOOD PRESSURE: 114 MMHG | HEIGHT: 64 IN | OXYGEN SATURATION: 98 % | BODY MASS INDEX: 34.15 KG/M2 | DIASTOLIC BLOOD PRESSURE: 80 MMHG

## 2018-04-05 DIAGNOSIS — J30.81 CHRONIC ALLERGIC RHINITIS DUE TO ANIMAL HAIR AND DANDER: ICD-10-CM

## 2018-04-05 DIAGNOSIS — J45.41 MODERATE PERSISTENT ASTHMA WITH ACUTE EXACERBATION: Primary | ICD-10-CM

## 2018-04-05 PROCEDURE — 99213 OFFICE O/P EST LOW 20 MIN: CPT | Mod: 25,,, | Performed by: ALLERGY & IMMUNOLOGY

## 2018-04-05 PROCEDURE — 95117 IMMUNOTHERAPY INJECTIONS: CPT | Mod: ,,, | Performed by: ALLERGY & IMMUNOLOGY

## 2018-04-05 RX ORDER — BUDESONIDE AND FORMOTEROL FUMARATE DIHYDRATE 160; 4.5 UG/1; UG/1
2 AEROSOL RESPIRATORY (INHALATION) EVERY 12 HOURS
Qty: 1 INHALER | Refills: 3 | Status: SHIPPED | OUTPATIENT
Start: 2018-04-05 | End: 2019-07-30 | Stop reason: SDUPTHER

## 2018-05-07 ENCOUNTER — PROCEDURE VISIT (OUTPATIENT)
Dept: ALLERGY | Facility: CLINIC | Age: 48
End: 2018-05-07
Payer: COMMERCIAL

## 2018-05-07 ENCOUNTER — CLINICAL SUPPORT (OUTPATIENT)
Dept: ALLERGY | Facility: CLINIC | Age: 48
End: 2018-05-07
Payer: COMMERCIAL

## 2018-05-07 VITALS
HEIGHT: 64 IN | DIASTOLIC BLOOD PRESSURE: 84 MMHG | SYSTOLIC BLOOD PRESSURE: 134 MMHG | HEIGHT: 64 IN | WEIGHT: 200 LBS | DIASTOLIC BLOOD PRESSURE: 84 MMHG | BODY MASS INDEX: 34.15 KG/M2 | SYSTOLIC BLOOD PRESSURE: 134 MMHG | BODY MASS INDEX: 34.15 KG/M2 | WEIGHT: 200 LBS

## 2018-05-07 DIAGNOSIS — T78.1XXA ADVERSE FOOD REACTION, INITIAL ENCOUNTER: ICD-10-CM

## 2018-05-07 DIAGNOSIS — J30.81 ALLERGIC RHINITIS DUE TO ANIMAL HAIR AND DANDER: Primary | ICD-10-CM

## 2018-05-07 PROCEDURE — 95004 PERQ TESTS W/ALRGNC XTRCS: CPT | Mod: ,,, | Performed by: ALLERGY & IMMUNOLOGY

## 2018-05-07 PROCEDURE — 95117 IMMUNOTHERAPY INJECTIONS: CPT | Mod: ,,, | Performed by: ALLERGY & IMMUNOLOGY

## 2018-05-07 PROCEDURE — 99499 UNLISTED E&M SERVICE: CPT | Mod: ,,, | Performed by: ALLERGY & IMMUNOLOGY

## 2018-05-08 NOTE — PROGRESS NOTES
Immunotherapy Note: Allergy Shot      Subjective:       Patient ID: Riddhi Thapa is a 47 y.o. female presents for monthly immunotherapy  Tolerated last injection  No New Medications  Beta Blockers: not on beta blocker    Reaction with last injection?: No  If female, are you pregnant?: No  Are you on any beta blockers?: No  Has the vial ?: No    Are you ill today?: No  Asthma exacerbation or symptoms: No  Do you have a fever today?: No    Peak Flow: 380    Expiration Date #1: 19  Vial Concentration: 1:1 v/v (RED)  Dose (mL) #1: 0.5 mL  Location: Right upper arm  Given By: am    Expiration Date #2: 19  Vial Concentration: 1:1 v/v (RED)  Dose (mL) #2: 0.5 mL  Location: Left upper arm  Given By : am                           Objective:     Riddhi Thapa observed for 30 minutes and discharged in good condition        Discussion:     Pt understands the current recommendation. All questions answered to the best of my ability. Continue current management plan.      Electronically signed by Samara Jones    Allergy/Imunology  [facility]  [place] [address] [address] [facility]  [place] [address]   Office [phone]

## 2018-05-08 NOTE — PROCEDURES
Procedures     quintip device was used for food allergens made by [employer]. Allergens were placed on the volar surface of the forearm.  Adequate histamine and saline control was used with amarilis tip.  Pertinent positives: neg   Soy 2 mm   Egg 1 mm

## 2018-05-18 ENCOUNTER — OFFICE VISIT (OUTPATIENT)
Dept: ALLERGY | Facility: CLINIC | Age: 48
End: 2018-05-18
Payer: COMMERCIAL

## 2018-05-18 VITALS
HEIGHT: 64 IN | SYSTOLIC BLOOD PRESSURE: 122 MMHG | DIASTOLIC BLOOD PRESSURE: 76 MMHG | WEIGHT: 202.81 LBS | BODY MASS INDEX: 34.62 KG/M2

## 2018-05-18 DIAGNOSIS — R68.89 FLU-LIKE SYMPTOMS: Primary | ICD-10-CM

## 2018-05-18 DIAGNOSIS — R05.9 COUGH: ICD-10-CM

## 2018-05-18 DIAGNOSIS — J45.41 MODERATE PERSISTENT ASTHMA WITH ACUTE EXACERBATION: ICD-10-CM

## 2018-05-18 DIAGNOSIS — J30.1 NON-SEASONAL ALLERGIC RHINITIS DUE TO POLLEN: ICD-10-CM

## 2018-05-18 PROCEDURE — 99214 OFFICE O/P EST MOD 30 MIN: CPT | Mod: ,,, | Performed by: ALLERGY & IMMUNOLOGY

## 2018-05-18 RX ORDER — CEFDINIR 300 MG/1
300 CAPSULE ORAL EVERY 12 HOURS
COMMUNITY
End: 2018-08-28

## 2018-05-18 RX ORDER — BENZONATATE 100 MG/1
100 CAPSULE ORAL 3 TIMES DAILY PRN
Qty: 90 CAPSULE | Refills: 3 | Status: SHIPPED | OUTPATIENT
Start: 2018-05-18 | End: 2018-05-28

## 2018-05-18 RX ORDER — PREDNISONE 10 MG/1
TABLET ORAL
COMMUNITY
End: 2018-08-28

## 2018-05-18 RX ORDER — AZELASTINE HYDROCHLORIDE, FLUTICASONE PROPIONATE 137; 50 UG/1; UG/1
1 SPRAY, METERED NASAL 2 TIMES DAILY
Qty: 1 BOTTLE | Refills: 6 | Status: SHIPPED | OUTPATIENT
Start: 2018-05-18 | End: 2019-07-30 | Stop reason: SDUPTHER

## 2018-05-18 RX ORDER — MONTELUKAST SODIUM 10 MG/1
10 TABLET ORAL DAILY
Qty: 90 TABLET | Refills: 6 | Status: SHIPPED | OUTPATIENT
Start: 2018-05-18 | End: 2019-06-24 | Stop reason: SDUPTHER

## 2018-05-18 RX ORDER — OSELTAMIVIR PHOSPHATE 75 MG/1
75 CAPSULE ORAL 2 TIMES DAILY
Qty: 10 CAPSULE | Refills: 0 | Status: SHIPPED | OUTPATIENT
Start: 2018-05-18 | End: 2018-05-23

## 2018-05-18 NOTE — PATIENT INSTRUCTIONS
Nose:  Saline first always :o) 1-2 times per day   Continue dymista 1 spray per nare twice per day   But may also use 1 spray per nare four times per day .     Lung:  Continue symbicort 2 puffs twice per day   Start spiriva 2 puffs once per day - stop if better after 10 days   Finish prednisone - with reserve we may use more...   Continue asmanex 220 2 puffs twice per day - 10 days   Schedule rescue inhaler 4-6 puffs every 4 hours for the next 4 days, then q 8 x 3-4 days then q 12 then prn or as needed.   Continue montelukast 1 pill once per day   tamiflu 5 days.     Cough:  Tessalon perles can help with cough  Warm liquids and honey   Magic mouth wash 3 mL swish and swallow or spit as needed every 3 hours for cough.     Get flu shot in September !

## 2018-05-18 NOTE — PROGRESS NOTES
"Subjective:       Patient ID: Riddhi Thapa is a 47 y.o. female.    Chief Complaint: Asthma (dealing with problems since yesterday, but this morning was a little worse) and Shortness of Breath (more on excersion, but she states that she deals with it a lot too)    HPI     Pt presents for acute onset of asthma exacerbation.  Pt states yesterday, her asthma kicked in "big time." Her peak flows have been lower however for longer but didn't notify me.   Pt states she has been very fatigued, not using her rhinitis or asthma medications because she had felt better until now.  She denies any sick contacts recently.   She is on prednisone from urgent care-   Started On Tuesday bid, 40 mg x 5 days. Day 4/5.   Steroid shot Monday   omnicef 300 mg for sinusitis management from urgent care correction through dose. But no improvement on abx.   symbicort 160 2 puffs bid- not using routine controller use. Just started this week.   Using ree inhaler daily.       Review of Systems      General: neg unexpected weight changes, fevers, chills, night sweats, malaise  HEENT: see hpi, Neg eye pain, vision changes, ear drainage, nose bleeds, throat tightness, sores in the mouth  CV: Neg chest pain, palpitations, swelling  Resp: see hpi, neg hemoptysis.  GI: see hpi, neg dysphagia, night abdominal pain, reflux, chronic diarrhea, chronic constipation  Derm: See Hpi, neg new rash, neg flushing  Mu/sk: Neg joint pain, joint swelling   Psych: Neg anxiety  neuro: neg chronic headaches, muscle weakness  Endo: neg heat/cold intolerance, chronic fatigue    Objective:     Vitals:    05/18/18 0927   BP: 122/76   Weight: 92 kg (202 lb 12.8 oz)   Height: 5' 4" (1.626 m)   PF: 400 L/min        Physical Exam      General: no acute distress, well developed well nourished   HEENT:   Head:normocephalic atraumatic  Eyes: THANH, EOMI, Neg injection, scleral icterus, or conjunctival papillary hypertrophy.  Ears: tm clear bilaterally, normal canal  Nose:2-3 + " inferior turbinates pink, neg nasal polyps            Mucosa: dry             Septal irritation: left sided   OP: mucus membranes moist, - cobblestoning, + PND, neg erythema or lesions  Neck: supple, Full range of motion, neg lymphadenopathy  Chest: full respiratory excursion no abnormal chest abnormality  Resp: clear to ascultation bilaterally  CV: RRR, neg MRG, brisk capillary refill  Abdomen: BS+, non tender, non distended  Ext:  Neg clubbing, cyanosis, pitting edema  Skin: Neg rashes or lesions  Lymph: neg supraclavicular, axillary     Assessment:       1. Flu-like symptoms    2. Non-seasonal allergic rhinitis due to pollen    3. Moderate persistent asthma with acute exacerbation    4. Cough        Plan:       Flu-like symptoms  -     oseltamivir (TAMIFLU) 75 MG capsule; Take 1 capsule (75 mg total) by mouth 2 (two) times daily.  Dispense: 10 capsule; Refill: 0    Non-seasonal allergic rhinitis due to pollen  -     azelastine-fluticasone (DYMISTA) 137-50 mcg/spray Spry nassal spray; 1 spray by Each Nare route 2 (two) times daily.  Dispense: 1 Bottle; Refill: 6    Moderate persistent asthma with acute exacerbation  -     mometasone 220 mcg (30 doses) inhaler; Inhale 2 puffs into the lungs 2 (two) times daily. Controller  Dispense: 1 each; Refill: 3  -     tiotropium bromide (SPIRIVA RESPIMAT) 1.25 mcg/actuation Mist; Inhale 2 puffs into the lungs once daily. Controller  Dispense: 4 g; Refill: 6  -     montelukast (SINGULAIR) 10 mg tablet; Take 1 tablet (10 mg total) by mouth once daily.  Dispense: 90 tablet; Refill: 6    Cough  -     benzonatate (TESSALON) 100 MG capsule; Take 1 capsule (100 mg total) by mouth 3 (three) times daily as needed for Cough.  Dispense: 90 capsule; Refill: 3  -     diphenhydrAMINE-aluminum-magnesium hydroxide-simethicone-lidocaine HCl 2%; Swish and spit 5 mLs every 3 (three) hours as needed.  Dispense: 300 mL; Refill: 3         Follow up in 2 weeks     Instructions reviewed with patient  - spiriva sample given in clinic today     Nose:  Saline first always :o) 1-2 times per day   Continue dymista 1 spray per nare twice per day   But may also use 1 spray per nare four times per day .     Lung:  Continue symbicort 2 puffs twice per day   Start spiriva 2 puffs once per day - stop if better after 10 days   Finish prednisone - with reserve we may use more...   Continue asmanex 220 2 puffs twice per day - 10 days   Schedule rescue inhaler 4-6 puffs every 4 hours for the next 4 days, then q 8 x 3-4 days then q 12 then prn or as needed.   Continue montelukast 1 pill once per day   tamiflu 5 days.     Cough:  Tessalon perles can help with cough  Warm liquids and honey   Magic mouth wash 3 mL swish and swallow or spit as needed every 3 hours for cough.     Get flu shot in September !         Samara oJnes M.D.  Allergy/Immunology  [facility]   [phone] phone  [phone] fax

## 2018-06-05 ENCOUNTER — CLINICAL SUPPORT (OUTPATIENT)
Dept: ALLERGY | Facility: CLINIC | Age: 48
End: 2018-06-05
Payer: COMMERCIAL

## 2018-06-05 VITALS
HEIGHT: 64 IN | SYSTOLIC BLOOD PRESSURE: 122 MMHG | WEIGHT: 205.13 LBS | DIASTOLIC BLOOD PRESSURE: 62 MMHG | BODY MASS INDEX: 35.02 KG/M2

## 2018-06-05 DIAGNOSIS — J30.81 ALLERGIC RHINITIS DUE TO ANIMAL HAIR AND DANDER: Primary | ICD-10-CM

## 2018-06-05 PROCEDURE — 99499 UNLISTED E&M SERVICE: CPT | Mod: ,,, | Performed by: ALLERGY & IMMUNOLOGY

## 2018-06-05 PROCEDURE — 95117 IMMUNOTHERAPY INJECTIONS: CPT | Mod: ,,, | Performed by: ALLERGY & IMMUNOLOGY

## 2018-06-05 NOTE — PROGRESS NOTES
Immunotherapy Note: Allergy Shot      Subjective:       Patient ID: Riddhi Thapa is a 47 y.o. female presents for monthly immunotherapy  Tolerated last injection  No New Medications  Beta Blockers: not on beta blocker    Reaction with last injection?: No  If female, are you pregnant?: No  Are you on any beta blockers?: No  Has the vial ?: No    Are you ill today?: No  Asthma exacerbation or symptoms: No  Do you have a fever today?: No    Peak Flow: 410    Expiration Date #1: 19  Vial Concentration: 1:1 v/v (RED)  Dose (mL) #1: 0.5 mL  Location: Right upper arm  Given By: AM    Expiration Date #2: 19  Vial Concentration: 1:1 v/v (RED)  Dose (mL) #2: 0.5 mL  Location: Left upper arm  Given By : AM                           Objective:     Riddhi Thapa observed for 30 minutes and discharged in good condition        Discussion:     Pt understands the current recommendation. All questions answered to the best of my ability. Continue current management plan.      Electronically signed by Samara Jones    Allergy/Imunology  [facility]  [place] [address] [address] [facility]  [place] [address]   Office [phone]

## 2018-07-06 ENCOUNTER — CLINICAL SUPPORT (OUTPATIENT)
Dept: ALLERGY | Facility: CLINIC | Age: 48
End: 2018-07-06
Payer: COMMERCIAL

## 2018-07-06 VITALS
DIASTOLIC BLOOD PRESSURE: 72 MMHG | WEIGHT: 206.13 LBS | BODY MASS INDEX: 35.19 KG/M2 | HEIGHT: 64 IN | SYSTOLIC BLOOD PRESSURE: 128 MMHG

## 2018-07-06 DIAGNOSIS — J30.81 ALLERGIC RHINITIS DUE TO ANIMAL HAIR AND DANDER: Primary | ICD-10-CM

## 2018-07-06 PROCEDURE — 95117 IMMUNOTHERAPY INJECTIONS: CPT | Mod: ,,, | Performed by: ALLERGY & IMMUNOLOGY

## 2018-07-06 PROCEDURE — 99499 UNLISTED E&M SERVICE: CPT | Mod: ,,, | Performed by: ALLERGY & IMMUNOLOGY

## 2018-07-12 DIAGNOSIS — E06.3 HASHIMOTO'S THYROIDITIS: ICD-10-CM

## 2018-07-12 RX ORDER — LEVOTHYROXINE SODIUM 50 UG/1
50 TABLET ORAL
Qty: 30 TABLET | Refills: 5 | Status: SHIPPED | OUTPATIENT
Start: 2018-07-12 | End: 2019-01-21 | Stop reason: SDUPTHER

## 2018-08-07 ENCOUNTER — CLINICAL SUPPORT (OUTPATIENT)
Dept: ALLERGY | Facility: CLINIC | Age: 48
End: 2018-08-07
Payer: COMMERCIAL

## 2018-08-07 VITALS
DIASTOLIC BLOOD PRESSURE: 70 MMHG | WEIGHT: 204 LBS | HEIGHT: 64 IN | SYSTOLIC BLOOD PRESSURE: 118 MMHG | BODY MASS INDEX: 34.83 KG/M2

## 2018-08-07 DIAGNOSIS — J30.81 ALLERGIC RHINITIS DUE TO ANIMAL HAIR AND DANDER: Primary | ICD-10-CM

## 2018-08-07 PROCEDURE — 99499 UNLISTED E&M SERVICE: CPT | Mod: ,,, | Performed by: ALLERGY & IMMUNOLOGY

## 2018-08-07 PROCEDURE — 95117 IMMUNOTHERAPY INJECTIONS: CPT | Mod: ,,, | Performed by: ALLERGY & IMMUNOLOGY

## 2018-08-08 NOTE — PROGRESS NOTES
Immunotherapy Note: Allergy Shot      Subjective:       Patient ID: Riddhi Thapa is a 48 y.o. female presents for monthly immunotherapy  Tolerated last injection  No New Medications  Beta Blockers: not on beta blocker    Reaction with last injection?: No  If female, are you pregnant?: No  Are you on any beta blockers?: No  Has the vial ?: No    Are you ill today?: No  Asthma exacerbation or symptoms: No  Do you have a fever today?: No    Peak Flow: 420    Expiration Date #1: 19  Vial Concentration: 1:1 v/v (RED)  Dose (mL) #1: 0.5 mL  Location: Right upper arm  Given By: AM    Expiration Date #2: 19  Vial Concentration: 1:1 v/v (RED)  Dose (mL) #2: 0.5 mL  Location: Left upper arm  Given By : AM                           Objective:     Riddhi Thapa observed for 30 minutes and discharged in good condition        Discussion:     Pt understands the current recommendation. All questions answered to the best of my ability. Continue current management plan.      Electronically signed by Samara Jones    Allergy/Imunology  [facility]  [place] [address] [address] [facility]  [place] [address]   Office [phone]

## 2018-08-24 DIAGNOSIS — F41.8 MIXED ANXIETY DEPRESSIVE DISORDER: ICD-10-CM

## 2018-08-24 RX ORDER — VENLAFAXINE HYDROCHLORIDE 150 MG/1
CAPSULE, EXTENDED RELEASE ORAL
Qty: 30 CAPSULE | Refills: 1 | Status: SHIPPED | OUTPATIENT
Start: 2018-08-24 | End: 2018-08-28 | Stop reason: SDUPTHER

## 2018-08-28 ENCOUNTER — OFFICE VISIT (OUTPATIENT)
Dept: FAMILY MEDICINE | Facility: CLINIC | Age: 48
End: 2018-08-28
Payer: COMMERCIAL

## 2018-08-28 VITALS
OXYGEN SATURATION: 98 % | DIASTOLIC BLOOD PRESSURE: 76 MMHG | SYSTOLIC BLOOD PRESSURE: 122 MMHG | BODY MASS INDEX: 34.23 KG/M2 | WEIGHT: 200.5 LBS | HEART RATE: 80 BPM | HEIGHT: 64 IN

## 2018-08-28 DIAGNOSIS — F41.8 MIXED ANXIETY DEPRESSIVE DISORDER: ICD-10-CM

## 2018-08-28 DIAGNOSIS — E03.8 HYPOTHYROIDISM DUE TO HASHIMOTO'S THYROIDITIS: Primary | ICD-10-CM

## 2018-08-28 DIAGNOSIS — E06.3 HASHIMOTO'S THYROIDITIS: ICD-10-CM

## 2018-08-28 DIAGNOSIS — E06.3 HYPOTHYROIDISM DUE TO HASHIMOTO'S THYROIDITIS: Primary | ICD-10-CM

## 2018-08-28 DIAGNOSIS — E78.2 MIXED HYPERLIPIDEMIA: ICD-10-CM

## 2018-08-28 DIAGNOSIS — E66.09 CLASS 1 OBESITY DUE TO EXCESS CALORIES WITH BODY MASS INDEX (BMI) OF 34.0 TO 34.9 IN ADULT, UNSPECIFIED WHETHER SERIOUS COMORBIDITY PRESENT: ICD-10-CM

## 2018-08-28 PROCEDURE — 99214 OFFICE O/P EST MOD 30 MIN: CPT | Mod: ,,, | Performed by: NURSE PRACTITIONER

## 2018-08-28 PROCEDURE — 3008F BODY MASS INDEX DOCD: CPT | Mod: ,,, | Performed by: NURSE PRACTITIONER

## 2018-08-28 RX ORDER — VENLAFAXINE HYDROCHLORIDE 150 MG/1
150 CAPSULE, EXTENDED RELEASE ORAL DAILY
Qty: 90 CAPSULE | Refills: 1 | Status: SHIPPED | OUTPATIENT
Start: 2018-08-28 | End: 2019-02-28 | Stop reason: SDUPTHER

## 2018-08-28 NOTE — PROGRESS NOTES
Subjective:       Patient ID: Riddhi Thapa is a 48 y.o. female.    Chief Complaint: Thyroid Problem (6 mo f/u)    Thyroid Problem   Presents for follow-up visit. Symptoms include anxiety and depressed mood. Patient reports no cold intolerance, dry skin, fatigue, hair loss, heat intolerance, hoarse voice, nail problem, palpitations, tremors or visual change. The symptoms have been stable. Her past medical history is significant for hyperlipidemia. There is no history of diabetes.   Hyperlipidemia   This is a new problem. The current episode started more than 1 month ago. The problem is uncontrolled. Recent lipid tests were reviewed and are variable. Exacerbating diseases include hypothyroidism and obesity. She has no history of chronic renal disease, diabetes, liver disease or nephrotic syndrome. Factors aggravating her hyperlipidemia include fatty foods. Pertinent negatives include no chest pain, focal weakness, leg pain or shortness of breath. Current antihyperlipidemic treatment includes diet change. The current treatment provides mild improvement of lipids. Compliance problems include adherence to diet and adherence to exercise.  Risk factors for coronary artery disease include stress, dyslipidemia and obesity.   Anxiety   Presents for follow-up visit. Symptoms include depressed mood, excessive worry, irritability and nervous/anxious behavior. Patient reports no chest pain, confusion, nausea, palpitations, shortness of breath or suicidal ideas. Symptoms occur occasionally. The severity of symptoms is mild. The quality of sleep is fair. Nighttime awakenings: occasional.     Compliance with medications is %.   Depression   Visit Type: follow-up  Patient presents with the following symptoms: depressed mood, excessive worry, fatigue, irritability and nervousness/anxiety.  Patient is not experiencing: confusion, palpitations, shortness of breath, suicidal ideas, suicidal planning and thoughts of  death.  Frequency of symptoms: occasionally   Severity: mild   Sleep quality: fair  Nighttime awakenings: occasional  Compliance with medications:  %          Review of Systems   Constitutional: Positive for irritability. Negative for activity change, appetite change, fatigue and fever.        Obesity   HENT: Negative for congestion, ear discharge, ear pain, hoarse voice, sore throat, trouble swallowing and voice change.    Eyes: Negative for photophobia, pain, discharge and visual disturbance.   Respiratory: Negative for cough, chest tightness and shortness of breath.    Cardiovascular: Negative for chest pain and palpitations.        Hyperlipidemia   Gastrointestinal: Negative for abdominal pain, nausea and vomiting.   Endocrine: Negative for cold intolerance and heat intolerance.        Hypothyroid   Genitourinary: Negative for difficulty urinating and dysuria.   Musculoskeletal: Negative for arthralgias and gait problem.   Skin: Negative for rash.   Allergic/Immunologic: Negative for immunocompromised state.   Neurological: Negative for tremors, focal weakness, speech difficulty and headaches.   Psychiatric/Behavioral: Positive for depression and dysphoric mood. Negative for confusion, self-injury and suicidal ideas. The patient is nervous/anxious.        Past Medical History:   Diagnosis Date    Anxiety     Asthma     Depression     GERD (gastroesophageal reflux disease)     Hashimoto's thyroiditis     Hyperlipidemia     Hypothyroidism     Kidney stones       Past Surgical History:   Procedure Laterality Date    EXTRACORPOREAL SHOCK WAVE LITHOTRIPSY  2011    LITHOTRIPSY      SINUS SURGERY  2015    TUBAL LIGATION      Bi-Lat       Family History   Problem Relation Age of Onset    Eczema Mother     Thyroid disease Mother     Cancer Father         Renal    Eczema Father     COPD Maternal Grandmother     Diabetes Maternal Grandmother     Stroke Maternal Grandmother     Thyroid disease  Maternal Grandmother     Asthma Neg Hx     Immunodeficiency Neg Hx     Rhinitis Neg Hx        Social History     Socioeconomic History    Marital status:      Spouse name: None    Number of children: None    Years of education: None    Highest education level: None   Social Needs    Financial resource strain: None    Food insecurity - worry: None    Food insecurity - inability: None    Transportation needs - medical: None    Transportation needs - non-medical: None   Occupational History    Occupation: Teacher     Comment: [employer]    Tobacco Use    Smoking status: Never Smoker    Smokeless tobacco: Never Used   Substance and Sexual Activity    Alcohol use: Yes     Comment: weekends only    Drug use: No    Sexual activity: None   Other Topics Concern    None   Social History Narrative    None       Current Outpatient Medications   Medication Sig Dispense Refill    albuterol (PROAIR HFA) 90 mcg/actuation inhaler Inhale 2 puffs into the lungs every 6 (six) hours as needed for Wheezing. 8 g 2    azelastine-fluticasone (DYMISTA) 137-50 mcg/spray Spry nassal spray 1 spray by Each Nare route 2 (two) times daily. 1 Bottle 6    budesonide-formoterol 160-4.5 mcg (SYMBICORT) 160-4.5 mcg/actuation HFAA Inhale 2 puffs into the lungs every 12 (twelve) hours. Controller 1 Inhaler 3    cetirizine (ZYRTEC) 10 mg Cap Take 1 capsule by mouth daily as needed.      cholecalciferol, vitamin D3, (VITAMIN D3) 1,000 unit capsule Take 1 capsule by mouth Daily.      dexlansoprazole (DEXILANT) 60 mg capsule Take 60 mg by mouth once daily.        levothyroxine (SYNTHROID) 50 MCG tablet Take 1 tablet (50 mcg total) by mouth before breakfast. 30 tablet 5    mometasone 220 mcg (30 doses) inhaler Inhale 2 puffs into the lungs 2 (two) times daily. Controller 1 each 3    montelukast (SINGULAIR) 10 mg tablet Take 1 tablet (10 mg total) by mouth once daily. 90 tablet 6    tiotropium bromide (SPIRIVA RESPIMAT)  "1.25 mcg/actuation Mist Inhale 2 puffs into the lungs once daily. Controller 4 g 6    valacyclovir (VALTREX) 500 MG tablet Take 1 tablet by mouth as needed.      venlafaxine (EFFEXOR-XR) 150 MG Cp24 Take 1 capsule (150 mg total) by mouth once daily. 90 capsule 1     Current Facility-Administered Medications   Medication Dose Route Frequency Provider Last Rate Last Dose    Allergy Mix   Subcutaneous Q30 Days Samara Jones MD           Review of patient's allergies indicates:   Allergen Reactions    Nsaids (non-steroidal anti-inflammatory drug)      causes upset stomach, has GERD     Objective:    HPI     Thyroid Problem      Additional comments: 6 mo f/u          Last edited by Hannah Duckworth LPN on 8/28/2018  3:29 PM. (History)      Blood pressure 122/76, pulse 80, height 5' 4" (1.626 m), weight 90.9 kg (200 lb 8 oz), SpO2 98 %. Body mass index is 34.42 kg/m².   Physical Exam   Constitutional: She is oriented to person, place, and time. She appears well-developed. She is cooperative. No distress.   Obesity   HENT:   Head: Normocephalic and atraumatic.   Right Ear: Tympanic membrane normal.   Left Ear: Tympanic membrane normal.   Nose: Nose normal.   Mouth/Throat: Oropharynx is clear and moist.   Eyes: Conjunctivae, EOM and lids are normal. Pupils are equal, round, and reactive to light. Lids are everted and swept, no foreign bodies found. Right pupil is round and reactive. Left pupil is round and reactive.   Neck: Trachea normal and normal range of motion. Neck supple. No thyromegaly present.   Cardiovascular: Normal rate, regular rhythm, S1 normal, S2 normal, normal heart sounds and intact distal pulses.   Pulmonary/Chest: Effort normal and breath sounds normal. She has no wheezes.   Abdominal: Soft. Bowel sounds are normal. There is no rigidity and no guarding.   Musculoskeletal: Normal range of motion.   Lymphadenopathy:     She has no cervical adenopathy.     She has no axillary adenopathy. "   Neurological: She is alert and oriented to person, place, and time.   Skin: Skin is warm and dry. Capillary refill takes less than 2 seconds.   Psychiatric: Her speech is normal and behavior is normal. Judgment and thought content normal. Her mood appears anxious (controlled). Cognition and memory are normal. She exhibits a depressed mood (controlled).   Nursing note and vitals reviewed.          Assessment:       1. Hypothyroidism due to Hashimoto's thyroiditis    2. Mixed anxiety depressive disorder    3. Mixed hyperlipidemia    4. Class 1 obesity due to excess calories with body mass index (BMI) of 34.0 to 34.9 in adult, unspecified whether serious comorbidity present    5. Hashimoto's thyroiditis        Plan:       Riddhi was seen today for thyroid problem.    Diagnoses and all orders for this visit:    Hypothyroidism due to Hashimoto's thyroiditis  -Continue current thyroid medication.  Labs ordered to obtain level to assess for adequate thyroid replacement.    Mixed anxiety depressive disorder  -     venlafaxine (EFFEXOR-XR) 150 MG Cp24; Take 1 capsule (150 mg total) by mouth once daily.  -Continue this medication.    Mixed hyperlipidemia  -Limit red meat, butter, fried foods, cheese, and other foods that have a lot of saturated fat. Consume more: lean meats, fish, fruits, vegetables, whole grains, beans, lentils, and nuts.  Weight loss, and 30-45 min of cardiovascular exercise daily.   -Patient has made diet changes over the last 6 months.  Labs ordered to assess if cholesterol is now controlled.    Class 1 obesity due to excess calories with body mass index (BMI) of 34.0 to 34.9 in adult, unspecified whether serious comorbidity present  -The patient is asked to make an attempt to improve diet and exercise patterns to aid in medical management of this problem.    Hashimoto's thyroiditis  -     TSH; Future  -     TSH       Have lipids and tsh drawn within the next week.  Will call with lab  results.    Follow up in 6 months for thyroid, cholesterol and anxiety.  Follow up sooner if needed.

## 2018-09-06 ENCOUNTER — CLINICAL SUPPORT (OUTPATIENT)
Dept: ALLERGY | Facility: CLINIC | Age: 48
End: 2018-09-06
Payer: COMMERCIAL

## 2018-09-06 VITALS
BODY MASS INDEX: 34.66 KG/M2 | DIASTOLIC BLOOD PRESSURE: 78 MMHG | WEIGHT: 203 LBS | SYSTOLIC BLOOD PRESSURE: 128 MMHG | HEIGHT: 64 IN

## 2018-09-06 DIAGNOSIS — J30.81 ALLERGIC RHINITIS DUE TO ANIMAL HAIR AND DANDER: Primary | ICD-10-CM

## 2018-09-06 PROCEDURE — 95117 IMMUNOTHERAPY INJECTIONS: CPT | Mod: ,,, | Performed by: ALLERGY & IMMUNOLOGY

## 2018-09-06 PROCEDURE — 99499 UNLISTED E&M SERVICE: CPT | Mod: ,,, | Performed by: ALLERGY & IMMUNOLOGY

## 2018-09-06 NOTE — PROGRESS NOTES
Immunotherapy Note: Allergy Shot      Subjective:       Patient ID: Riddhi Thapa is a 48 y.o. female presents for monthly immunotherapy  Tolerated last injection  No New Medications  Beta Blockers: not on beta blocker    Reaction with last injection?: No  If female, are you pregnant?: No  Are you on any beta blockers?: No  Has the vial ?: No    Are you ill today?: No  Asthma exacerbation or symptoms: No  Do you have a fever today?: No    Peak Flow: 430    Expiration Date #1: 19  Vial Concentration: 1:1 v/v (RED)  Dose (mL) #1: 0.5 mL  Location: Right upper arm  Given By: EL    Expiration Date #2: 19  Vial Concentration: 1:1 v/v (RED)  Dose (mL) #2: 0.5 mL  Location: Left upper arm  Given By : EL                           Objective:     Riddhi Thapa observed for 30 minutes and discharged in good condition        Discussion:     Pt understands the current recommendation. All questions answered to the best of my ability. Continue current management plan.      Electronically signed by Samara Jones    Allergy/Imunology  [facility]  [place] [address]  [place] [address]   Office [phone]

## 2018-09-15 LAB
CHOLEST SERPL-MCNC: 187 MG/DL
CHOLEST/HDLC SERPL: 3.7 (CALC)
HDLC SERPL-MCNC: 50 MG/DL
LDLC SERPL CALC-MCNC: 112 MG/DL (CALC)
NONHDLC SERPL-MCNC: 137 MG/DL (CALC)
TRIGL SERPL-MCNC: 135 MG/DL
TSH SERPL-ACNC: 1.23 MIU/L

## 2018-09-28 ENCOUNTER — TELEPHONE (OUTPATIENT)
Dept: FAMILY MEDICINE | Facility: CLINIC | Age: 48
End: 2018-09-28

## 2018-09-28 NOTE — TELEPHONE ENCOUNTER
----- Message from JEANNE Reid sent at 9/28/2018 10:11 AM CDT -----  Thyroid is stable. LDL cholesterol is mildly elevated. Continue diet and exercise routine. Limit red meat, butter, fried foods, cheese, and other foods that have a lot of saturated fat. Consume more: lean meats, fish, fruits, vegetables, whole grains, beans, lentils, and nuts.  Weight loss, and 30-45 min of cardiovascular exercise daily.

## 2018-10-08 ENCOUNTER — CLINICAL SUPPORT (OUTPATIENT)
Dept: ALLERGY | Facility: CLINIC | Age: 48
End: 2018-10-08
Payer: COMMERCIAL

## 2018-10-08 VITALS
BODY MASS INDEX: 33.46 KG/M2 | SYSTOLIC BLOOD PRESSURE: 128 MMHG | WEIGHT: 196 LBS | HEIGHT: 64 IN | DIASTOLIC BLOOD PRESSURE: 82 MMHG

## 2018-10-08 DIAGNOSIS — J30.81 ALLERGIC RHINITIS DUE TO ANIMAL HAIR AND DANDER: Primary | ICD-10-CM

## 2018-10-08 PROCEDURE — 99499 UNLISTED E&M SERVICE: CPT | Mod: ,,, | Performed by: ALLERGY & IMMUNOLOGY

## 2018-10-08 PROCEDURE — 95117 IMMUNOTHERAPY INJECTIONS: CPT | Mod: ,,, | Performed by: ALLERGY & IMMUNOLOGY

## 2018-10-08 NOTE — PROGRESS NOTES
Immunotherapy Note: Allergy Shot      Subjective:       Patient ID: Riddhi Thapa is a 48 y.o. female presents for monthly immunotherapy  Tolerated last injection  No New Medications  Beta Blockers: not on beta blocker    Reaction with last injection?: No  If female, are you pregnant?: No  Are you on any beta blockers?: No  Has the vial ?: No    Are you ill today?: No  Asthma exacerbation or symptoms: No  Do you have a fever today?: No    Peak Flow: 450    Expiration Date #1: 19  Vial Concentration: 1:1 v/v (RED)  Dose (mL) #1: 0.5 mL  Location: Right upper arm  Given By: EL    Expiration Date #2: 19  Vial Concentration: 1:1 v/v (RED)  Dose (mL) #2: 0.5 mL  Location: Left upper arm  Given By : EL                           Objective:     Riddhi Thapa observed for 30 minutes and discharged in good condition        Discussion:     Pt understands the current recommendation. All questions answered to the best of my ability. Continue current management plan.      Electronically signed by Samara Jones    Allergy/Imunology  [facility]  [place] [address] [address] [facility] [facility]  [place] [address]   Office [phone]

## 2018-10-10 ENCOUNTER — OFFICE VISIT (OUTPATIENT)
Dept: FAMILY MEDICINE | Facility: CLINIC | Age: 48
End: 2018-10-10
Payer: COMMERCIAL

## 2018-10-10 VITALS
HEIGHT: 64 IN | DIASTOLIC BLOOD PRESSURE: 82 MMHG | RESPIRATION RATE: 18 BRPM | TEMPERATURE: 98 F | WEIGHT: 196.31 LBS | SYSTOLIC BLOOD PRESSURE: 126 MMHG | HEART RATE: 66 BPM | BODY MASS INDEX: 33.51 KG/M2 | OXYGEN SATURATION: 97 %

## 2018-10-10 DIAGNOSIS — J02.9 PHARYNGITIS, UNSPECIFIED ETIOLOGY: Primary | ICD-10-CM

## 2018-10-10 LAB
CTP QC/QA: YES
S PYO RRNA THROAT QL PROBE: NEGATIVE

## 2018-10-10 PROCEDURE — 96372 THER/PROPH/DIAG INJ SC/IM: CPT | Mod: ,,, | Performed by: NURSE PRACTITIONER

## 2018-10-10 PROCEDURE — 87880 STREP A ASSAY W/OPTIC: CPT | Mod: QW,,, | Performed by: NURSE PRACTITIONER

## 2018-10-10 PROCEDURE — 99213 OFFICE O/P EST LOW 20 MIN: CPT | Mod: 25,,, | Performed by: NURSE PRACTITIONER

## 2018-10-10 PROCEDURE — 3008F BODY MASS INDEX DOCD: CPT | Mod: ,,, | Performed by: NURSE PRACTITIONER

## 2018-10-10 RX ORDER — AMOXICILLIN AND CLAVULANATE POTASSIUM 875; 125 MG/1; MG/1
1 TABLET, FILM COATED ORAL 2 TIMES DAILY
Qty: 20 TABLET | Refills: 0 | Status: SHIPPED | OUTPATIENT
Start: 2018-10-10 | End: 2019-02-28

## 2018-10-10 RX ORDER — DEXAMETHASONE SODIUM PHOSPHATE 4 MG/ML
8 INJECTION, SOLUTION INTRA-ARTICULAR; INTRALESIONAL; INTRAMUSCULAR; INTRAVENOUS; SOFT TISSUE
Status: COMPLETED | OUTPATIENT
Start: 2018-10-10 | End: 2018-10-10

## 2018-10-10 RX ADMIN — DEXAMETHASONE SODIUM PHOSPHATE 8 MG: 4 INJECTION, SOLUTION INTRA-ARTICULAR; INTRALESIONAL; INTRAMUSCULAR; INTRAVENOUS; SOFT TISSUE at 03:10

## 2018-10-10 NOTE — PATIENT INSTRUCTIONS
Pharyngitis: Presumed Strep (Child)  Pharyngitis is a sore throat. Sore throat is a common condition in children. It can be caused by an infection with the bacterium streptococcus. This is commonly known as strep throat.  Strep throat starts suddenly. Symptoms include a red, swollen throat and swollen lymph nodes, which make it painful to swallow. Red spots may appear on the roof of the mouth. Some children will be flushed and have a fever. Young children may not show that they feel pain. But they may refuse to eat or drink or drool a lot.  Strep throat is diagnosed with a rapid test or a throat culture. If the rapid test results are unclear, your child will need a throat culture. Results from the culture may take up to 2 days. This waiting period may be hard for you and your child. The doctor may prescribe medicines to treat fever and pain. Because strep throat is very contagious, your child must stay at home until the diagnosis is known.  If a strep infection is confirmed, your childs healthcare provider will prescribe antibiotic medicine. This may be given by injection or pills. Children with strep throat are contagious until they have been taking antibiotic medicine for 24 hours.    Home care  Medicines  Follow these guidelines when giving your child medicine at home:  · If your child has pain or fever, you can give him or her medicine as advised by your child's healthcare provider.  · Don't give your child any other medicine without first asking the provider.  Follow these tips when giving fever medicine to a usually healthy child:  · Dont give ibuprofen to children younger than 6 months old. Also dont give ibuprofen to an older child who is vomiting constantly and is dehydrated.  · Read the label before giving fever medicine. This is to make sure that you are giving the right dose. The dose should be right for your childs age and weight.  · If your child is taking other medicine, check the list of  ingredients. Look for acetaminophen or ibuprofen. If the medicine contains either of these, tell your childs healthcare provider before giving your child the medicine. This is to prevent a possible overdose.  · If your child is younger than 2 years, talk with your childs healthcare provider before giving any medicines to find out the right medicine to use and how much to give.  · Dont give aspirin to a child younger than 19 years old who is ill with a fever. Aspirin can cause serious side effects such as liver damage and Reye syndrome. Although rare, Reye syndrome is a very serious illness usually found in children younger than age 15. The syndrome is closely linked to the use of aspirin or aspirin-containing medicines during viral infections.  General care  · Keep your child home from school or day care until the provider tells you whether your child has strep throat. Strep throat is very contagious.   If strep throat is confirmed  · The healthcare provider will prescribe antibiotics. Follow all instructions for giving this medicine to your child. Make sure your child takes the medicine as directed until it is gone. You should not have any left over.    · Limit your child's contact with others until he or she is no longer contagious. This is 24 hours after starting antibiotics or as advised by your childs provider.   · Tell people who may have had contact with your child about his or her illness. This may include school officials and  center workers.  · Wash your hands with warm water and soap before and after caring for your child. This is to help prevent the spread of infection. Others should do the same.  · Give your child plenty of time to rest.  · Encourage your child to drink liquids.  · Older children may prefer ice chips, cold drinks, frozen desserts, or popsicles.  · Older children may also like warm chicken soup or beverages with lemon and honey. Dont give honey to a child younger than 1 year  old.  · Dont force your child to eat. If your child feels like eating, dont give him or her salty or spicy foods. These can irritate the throat.  · Older children may gargle with warm salt water to ease throat pain. Have your child spit out the gargle afterward and not swallow it.   Follow-up care  Follow up with your childs healthcare provider, or as directed.  When to seek medical advice  Unless advised otherwise, call your child's healthcare provider if:  · Your child is 3 months old or younger and has a fever of 100.4°F (38°C) or higher. Your child may need to see a healthcare provider.  · Your child is younger than 2 years of age and has a fever of 100.4°F (38°C) that continues for more than 1 day.  · Your child is 2 years old or older and has a fever of 100.4°F (38°C) that continues for more than 3 days.  · Your child is of any age and has repeated fevers above 104°F (40°C).  Also call your child's provider right away if any of these occur:  · Symptoms dont get better after taking prescribed medicine or seem to be getting worse  · New or worsening ear pain, sinus pain, or headache  · Painful lumps in the back of neck  · Lymph nodes are getting larger   · Your child cant swallow liquids, has lots of drooling, or cant open his or her mouth wide because of throat pain  · Signs of dehydration. These include very dark urine or no urine, sunken eyes, and dizziness.  · Noisy breathing  · Muffled voice  · New rash  Call 911  Call 911 if your child has any of these:  · Fever and your child has been in a very hot place such as an overheated car  · Trouble breathing  · Confusion  · Feeling drowsy or having trouble waking up  · Unresponsive  · Fainting or loss of consciousness  · Fast (rapid) heart rate  · Seizure  · Stiff neck     Date Last Reviewed: 4/13/2015  © 5178-7374 [employer]. [address] [place] [address], [place] [address]. All rights reserved. This information is not intended as a substitute for  professional medical care. Always follow your healthcare professional's instructions.

## 2018-10-10 NOTE — PROGRESS NOTES
nta    SUBJECTIVE:      Patient ID: Riddhi Thapa is a 48 y.o. female.    Chief Complaint: Sore Throat (son tested positive for strep)    Riddhi is here today with c/o sore throat for about 3 days.  She states that her son was diagnosed with strep throat today.      Sore Throat    This is a new problem. The current episode started in the past 7 days. The problem has been gradually worsening. The pain is worse on the left side. There has been no fever. Associated symptoms include congestion, coughing, ear pain, a hoarse voice, neck pain and swollen glands. Pertinent negatives include no abdominal pain, diarrhea, drooling, ear discharge, headaches, plugged ear sensation, shortness of breath, stridor, trouble swallowing or vomiting. She has had exposure to strep. Treatments tried: decongestant. The treatment provided mild relief.       Past Surgical History:   Procedure Laterality Date    EXTRACORPOREAL SHOCK WAVE LITHOTRIPSY  2011    LITHOTRIPSY      SINUS SURGERY  2015    TUBAL LIGATION      Bi-Lat     Family History   Problem Relation Age of Onset    Eczema Mother     Thyroid disease Mother     Cancer Father         Renal    Eczema Father     COPD Maternal Grandmother     Diabetes Maternal Grandmother     Stroke Maternal Grandmother     Thyroid disease Maternal Grandmother     Asthma Neg Hx     Immunodeficiency Neg Hx     Rhinitis Neg Hx       Social History     Socioeconomic History    Marital status:      Spouse name: None    Number of children: None    Years of education: None    Highest education level: None   Social Needs    Financial resource strain: None    Food insecurity - worry: None    Food insecurity - inability: None    Transportation needs - medical: None    Transportation needs - non-medical: None   Occupational History    Occupation: Teacher     Comment: [employer]   Tobacco Use    Smoking status: Never Smoker    Smokeless tobacco: Never Used   Substance and  Sexual Activity    Alcohol use: Yes     Comment: weekends only    Drug use: No    Sexual activity: None   Other Topics Concern    None   Social History Narrative    None     Current Outpatient Medications   Medication Sig Dispense Refill    albuterol (PROAIR HFA) 90 mcg/actuation inhaler Inhale 2 puffs into the lungs every 6 (six) hours as needed for Wheezing. 8 g 2    azelastine-fluticasone (DYMISTA) 137-50 mcg/spray Spry nassal spray 1 spray by Each Nare route 2 (two) times daily. 1 Bottle 6    budesonide-formoterol 160-4.5 mcg (SYMBICORT) 160-4.5 mcg/actuation HFAA Inhale 2 puffs into the lungs every 12 (twelve) hours. Controller 1 Inhaler 3    cetirizine (ZYRTEC) 10 mg Cap Take 1 capsule by mouth daily as needed.      cholecalciferol, vitamin D3, (VITAMIN D3) 1,000 unit capsule Take 1 capsule by mouth Daily.      dexlansoprazole (DEXILANT) 60 mg capsule Take 60 mg by mouth once daily.        levothyroxine (SYNTHROID) 50 MCG tablet Take 1 tablet (50 mcg total) by mouth before breakfast. 30 tablet 5    montelukast (SINGULAIR) 10 mg tablet Take 1 tablet (10 mg total) by mouth once daily. 90 tablet 6    tiotropium bromide (SPIRIVA RESPIMAT) 1.25 mcg/actuation Mist Inhale 2 puffs into the lungs once daily. Controller 4 g 6    valacyclovir (VALTREX) 500 MG tablet Take 1 tablet by mouth as needed.      venlafaxine (EFFEXOR-XR) 150 MG Cp24 Take 1 capsule (150 mg total) by mouth once daily. 90 capsule 1    amoxicillin-clavulanate 875-125mg (AUGMENTIN) 875-125 mg per tablet Take 1 tablet by mouth 2 (two) times daily. 20 tablet 0    mometasone 220 mcg (30 doses) inhaler Inhale 2 puffs into the lungs 2 (two) times daily. Controller 1 each 3     Current Facility-Administered Medications   Medication Dose Route Frequency Provider Last Rate Last Dose    Allergy Mix   Subcutaneous Q30 Days Samara Jones MD         Review of patient's allergies indicates:   Allergen Reactions    Nsaids (non-steroidal  "anti-inflammatory drug)      causes upset stomach, has GERD      Past Medical History:   Diagnosis Date    Anxiety     Asthma     Depression     GERD (gastroesophageal reflux disease)     Hashimoto's thyroiditis     Hyperlipidemia     Hypothyroidism     Kidney stones      Past Surgical History:   Procedure Laterality Date    EXTRACORPOREAL SHOCK WAVE LITHOTRIPSY  2011    LITHOTRIPSY      SINUS SURGERY  2015    TUBAL LIGATION      Bi-Lat       Review of Systems   Constitutional: Negative for activity change, appetite change, chills, diaphoresis, fatigue, fever and unexpected weight change.   HENT: Positive for congestion, ear pain, hoarse voice and sore throat. Negative for drooling, ear discharge, rhinorrhea and trouble swallowing.    Eyes: Negative for pain and visual disturbance.   Respiratory: Positive for cough. Negative for apnea, shortness of breath and stridor.    Cardiovascular: Negative for chest pain and palpitations.   Gastrointestinal: Negative for abdominal pain, constipation, diarrhea and vomiting.   Endocrine: Negative for polydipsia, polyphagia and polyuria.   Genitourinary: Negative for dysuria, frequency and urgency.   Musculoskeletal: Positive for neck pain. Negative for arthralgias, gait problem and myalgias.   Skin: Negative for color change, pallor and rash.   Allergic/Immunologic: Negative for immunocompromised state.   Neurological: Negative for dizziness, syncope, numbness and headaches.   Hematological: Negative for adenopathy.   Psychiatric/Behavioral: Negative for confusion, self-injury and suicidal ideas.      OBJECTIVE:      Vitals:    10/10/18 1421   BP: 126/82   Pulse: 66   Resp: 18   Temp: 98.3 °F (36.8 °C)   TempSrc: Oral   SpO2: 97%   Weight: 89 kg (196 lb 4.8 oz)   Height: 5' 4" (1.626 m)     Physical Exam   Constitutional: She is oriented to person, place, and time. She appears well-developed and well-nourished. No distress.   HENT:   Head: Normocephalic and " atraumatic.   Right Ear: Tympanic membrane, external ear and ear canal normal.   Left Ear: External ear and ear canal normal. Tympanic membrane is retracted. A middle ear effusion is present.   Nose: Nose normal. Right sinus exhibits no maxillary sinus tenderness and no frontal sinus tenderness. Left sinus exhibits no maxillary sinus tenderness and no frontal sinus tenderness.   Mouth/Throat: Uvula is midline. Posterior oropharyngeal edema and posterior oropharyngeal erythema present. No oropharyngeal exudate. Tonsils are 2+ on the right. Tonsils are 2+ on the left. Tonsillar exudate (milky).   Eyes: Conjunctivae, EOM and lids are normal. Pupils are equal, round, and reactive to light. Right eye exhibits no discharge. Left eye exhibits no discharge. No scleral icterus.   Neck: Normal range of motion. Neck supple. Carotid bruit is not present. No tracheal deviation present. No thyromegaly present.   Cardiovascular: Normal rate, regular rhythm and normal heart sounds. Exam reveals no gallop and no friction rub.   No murmur heard.  Pulmonary/Chest: Effort normal and breath sounds normal. No stridor. No respiratory distress. She has no wheezes. She has no rales.   Abdominal: Soft. Bowel sounds are normal. There is no tenderness.   Musculoskeletal: Normal range of motion.   Lymphadenopathy:     She has no cervical adenopathy.   Neurological: She is alert and oriented to person, place, and time.   Skin: Skin is warm, dry and intact. She is not diaphoretic.   Psychiatric: She has a normal mood and affect. She expresses no suicidal plans.   Vitals reviewed.     Assessment:       1. Pharyngitis, unspecified etiology        Plan:       Pharyngitis, unspecified etiology  -     dexamethasone injection 8 mg; Inject 2 mLs (8 mg total) into the muscle one time.  -     POCT rapid strep A  -     amoxicillin-clavulanate 875-125mg (AUGMENTIN) 875-125 mg per tablet; Take 1 tablet by mouth 2 (two) times daily.  Dispense: 20 tablet;  Refill: 0        Follow-up if symptoms worsen or fail to improve.      10/10/2018 HEDY Ring, FNP

## 2018-11-27 ENCOUNTER — CLINICAL SUPPORT (OUTPATIENT)
Dept: ALLERGY | Facility: CLINIC | Age: 48
End: 2018-11-27
Payer: COMMERCIAL

## 2018-11-27 VITALS
SYSTOLIC BLOOD PRESSURE: 120 MMHG | HEIGHT: 64 IN | OXYGEN SATURATION: 99 % | WEIGHT: 196 LBS | BODY MASS INDEX: 33.46 KG/M2 | HEART RATE: 86 BPM | DIASTOLIC BLOOD PRESSURE: 74 MMHG

## 2018-11-27 DIAGNOSIS — J30.81 ALLERGIC RHINITIS DUE TO ANIMAL HAIR AND DANDER: ICD-10-CM

## 2018-11-27 PROCEDURE — 95117 IMMUNOTHERAPY INJECTIONS: CPT | Mod: ,,, | Performed by: ALLERGY & IMMUNOLOGY

## 2018-11-27 PROCEDURE — 99499 UNLISTED E&M SERVICE: CPT | Mod: ,,, | Performed by: ALLERGY & IMMUNOLOGY

## 2018-11-27 NOTE — PROGRESS NOTES
Immunotherapy Note: Allergy Shot      Subjective:       Patient ID: Riddhi Thapa is a 48 y.o. female presents for monthly immunotherapy  Tolerated last injection  No New Medications  Beta Blockers: not on beta blocker    Reaction with last injection?: No  If female, are you pregnant?: No  Are you on any beta blockers?: No  Has the vial ?: No    Are you ill today?: No  Asthma exacerbation or symptoms: No  Do you have a fever today?: No    Peak Flow: 410    Expiration Date #1: 19  Vial Concentration: 1:1 v/v (RED)  Dose (mL) #1: 0.4 mL  Location: Right upper arm  Given By: EL    Expiration Date #2: 19  Vial Concentration: 1:1 v/v (RED)  Dose (mL) #2: 0.4 mL  Location: Left upper arm  Given By : EL                           Objective:     Riddhi Thapa observed for 30 minutes and discharged in good condition        Discussion:     Pt understands the current recommendation. All questions answered to the best of my ability. Continue current management plan.      Electronically signed by Lucretia Zuluaga    Allergy/Imunology  [facility]  [place] [address]  [place] [address]   Office [phone]

## 2018-12-11 ENCOUNTER — DOCUMENTATION ONLY (OUTPATIENT)
Dept: ALLERGY | Facility: CLINIC | Age: 48
End: 2018-12-11
Payer: COMMERCIAL

## 2018-12-11 DIAGNOSIS — J30.81 ALLERGIC RHINITIS DUE TO ANIMAL HAIR AND DANDER: ICD-10-CM

## 2018-12-11 PROCEDURE — 95165 ANTIGEN THERAPY SERVICES: CPT | Mod: ,,, | Performed by: ALLERGY & IMMUNOLOGY

## 2018-12-12 NOTE — PROGRESS NOTES
Using 70% alcohol clean surface was prepped. Sterile drape then covered clean space.  Hands were scrubbed and placed into sterile gloves. Gown, mask, and hair net worn according to  guidelines allergist exemption.    10 units     RX 1

## 2018-12-27 ENCOUNTER — CLINICAL SUPPORT (OUTPATIENT)
Dept: ALLERGY | Facility: CLINIC | Age: 48
End: 2018-12-27
Payer: COMMERCIAL

## 2018-12-27 VITALS
DIASTOLIC BLOOD PRESSURE: 68 MMHG | HEIGHT: 64 IN | SYSTOLIC BLOOD PRESSURE: 118 MMHG | WEIGHT: 197 LBS | BODY MASS INDEX: 33.63 KG/M2

## 2018-12-27 DIAGNOSIS — J30.81 ALLERGIC RHINITIS DUE TO ANIMAL HAIR AND DANDER: ICD-10-CM

## 2018-12-27 PROCEDURE — 95117 IMMUNOTHERAPY INJECTIONS: CPT | Mod: ,,, | Performed by: ALLERGY & IMMUNOLOGY

## 2018-12-27 PROCEDURE — 99499 UNLISTED E&M SERVICE: CPT | Mod: ,,, | Performed by: ALLERGY & IMMUNOLOGY

## 2018-12-27 NOTE — PROGRESS NOTES
Immunotherapy Note: Allergy Shot      Subjective:       Patient ID: Riddhi Thapa is a 48 y.o. female presents for monthly immunotherapy  Tolerated last injection  No New Medications  Beta Blockers: not on beta blocker    Reaction with last injection?: No  If female, are you pregnant?: No  Are you on any beta blockers?: No  Has the vial ?: No    Are you ill today?: No  Asthma exacerbation or symptoms: No  Do you have a fever today?: No    Peak Flow: 430    Expiration Date #1: 19  Vial Concentration: 1:1 v/v (RED)  Dose (mL) #1: 0.4 mL  Location: Right upper arm  Given By: EL    Expiration Date #2: 19  Vial Concentration: 1:1 v/v (RED)  Dose (mL) #2: 0.5 mL  Location: Left upper arm  Given By : EL                           Objective:     Riddhi Thapa observed for 30 minutes and discharged in good condition        Discussion:     Pt understands the current recommendation. All questions answered to the best of my ability. Continue current management plan.      Electronically signed by Samara Jones    Allergy/Imunology  [facility]  [place] [address] [address]  [place] [address]   Office [phone]

## 2019-01-21 DIAGNOSIS — E06.3 HASHIMOTO'S THYROIDITIS: ICD-10-CM

## 2019-01-21 RX ORDER — LEVOTHYROXINE SODIUM 50 UG/1
TABLET ORAL
Qty: 30 TABLET | Refills: 2 | Status: SHIPPED | OUTPATIENT
Start: 2019-01-21 | End: 2019-06-24 | Stop reason: SDUPTHER

## 2019-02-21 ENCOUNTER — PATIENT MESSAGE (OUTPATIENT)
Dept: FAMILY MEDICINE | Facility: CLINIC | Age: 49
End: 2019-02-21

## 2019-02-21 DIAGNOSIS — E03.9 ACQUIRED HYPOTHYROIDISM: ICD-10-CM

## 2019-02-21 DIAGNOSIS — E78.5 MILD HYPERLIPIDEMIA: Primary | ICD-10-CM

## 2019-02-24 ENCOUNTER — PATIENT MESSAGE (OUTPATIENT)
Dept: FAMILY MEDICINE | Facility: CLINIC | Age: 49
End: 2019-02-24

## 2019-02-28 ENCOUNTER — CLINICAL SUPPORT (OUTPATIENT)
Dept: ALLERGY | Facility: CLINIC | Age: 49
End: 2019-02-28
Payer: COMMERCIAL

## 2019-02-28 ENCOUNTER — OFFICE VISIT (OUTPATIENT)
Dept: FAMILY MEDICINE | Facility: CLINIC | Age: 49
End: 2019-02-28
Payer: COMMERCIAL

## 2019-02-28 VITALS
BODY MASS INDEX: 33.8 KG/M2 | OXYGEN SATURATION: 98 % | HEART RATE: 87 BPM | HEIGHT: 64 IN | HEART RATE: 99 BPM | DIASTOLIC BLOOD PRESSURE: 86 MMHG | OXYGEN SATURATION: 99 % | WEIGHT: 195.38 LBS | HEIGHT: 64 IN | BODY MASS INDEX: 33.35 KG/M2 | SYSTOLIC BLOOD PRESSURE: 122 MMHG | WEIGHT: 198 LBS

## 2019-02-28 DIAGNOSIS — J30.81 ALLERGIC RHINITIS DUE TO ANIMAL HAIR AND DANDER: ICD-10-CM

## 2019-02-28 DIAGNOSIS — Z23 NEED FOR INFLUENZA VACCINATION: ICD-10-CM

## 2019-02-28 DIAGNOSIS — E66.09 CLASS 1 OBESITY DUE TO EXCESS CALORIES WITH SERIOUS COMORBIDITY AND BODY MASS INDEX (BMI) OF 33.0 TO 33.9 IN ADULT: ICD-10-CM

## 2019-02-28 DIAGNOSIS — E06.3 HASHIMOTO'S THYROIDITIS: Primary | ICD-10-CM

## 2019-02-28 DIAGNOSIS — R41.840 ATTENTION DEFICIT: ICD-10-CM

## 2019-02-28 DIAGNOSIS — K21.9 GASTROESOPHAGEAL REFLUX DISEASE, ESOPHAGITIS PRESENCE NOT SPECIFIED: ICD-10-CM

## 2019-02-28 DIAGNOSIS — F41.8 MIXED ANXIETY DEPRESSIVE DISORDER: ICD-10-CM

## 2019-02-28 DIAGNOSIS — E78.2 MIXED HYPERLIPIDEMIA: ICD-10-CM

## 2019-02-28 PROCEDURE — 90471 IMMUNIZATION ADMIN: CPT | Mod: ,,, | Performed by: NURSE PRACTITIONER

## 2019-02-28 PROCEDURE — 95117 IMMUNOTHERAPY INJECTIONS: CPT | Mod: ,,, | Performed by: ALLERGY & IMMUNOLOGY

## 2019-02-28 PROCEDURE — 95117 PR IMMU2THERAPY, 2+ INJECTIONS: ICD-10-PCS | Mod: ,,, | Performed by: ALLERGY & IMMUNOLOGY

## 2019-02-28 PROCEDURE — 99499 NO LOS: ICD-10-PCS | Mod: ,,, | Performed by: ALLERGY & IMMUNOLOGY

## 2019-02-28 PROCEDURE — 90686 IIV4 VACC NO PRSV 0.5 ML IM: CPT | Mod: ,,, | Performed by: NURSE PRACTITIONER

## 2019-02-28 PROCEDURE — 90471 FLU VACCINE (QUAD) GREATER THAN OR EQUAL TO 3YO PRESERVATIVE FREE IM: ICD-10-PCS | Mod: ,,, | Performed by: NURSE PRACTITIONER

## 2019-02-28 PROCEDURE — 99499 UNLISTED E&M SERVICE: CPT | Mod: ,,, | Performed by: ALLERGY & IMMUNOLOGY

## 2019-02-28 PROCEDURE — 3008F PR BODY MASS INDEX (BMI) DOCUMENTED: ICD-10-PCS | Mod: ,,, | Performed by: NURSE PRACTITIONER

## 2019-02-28 PROCEDURE — 90686 FLU VACCINE (QUAD) GREATER THAN OR EQUAL TO 3YO PRESERVATIVE FREE IM: ICD-10-PCS | Mod: ,,, | Performed by: NURSE PRACTITIONER

## 2019-02-28 PROCEDURE — 99214 OFFICE O/P EST MOD 30 MIN: CPT | Mod: 25,,, | Performed by: NURSE PRACTITIONER

## 2019-02-28 PROCEDURE — 3008F BODY MASS INDEX DOCD: CPT | Mod: ,,, | Performed by: NURSE PRACTITIONER

## 2019-02-28 PROCEDURE — 99214 PR OFFICE/OUTPT VISIT, EST, LEVL IV, 30-39 MIN: ICD-10-PCS | Mod: 25,,, | Performed by: NURSE PRACTITIONER

## 2019-02-28 RX ORDER — VENLAFAXINE HYDROCHLORIDE 150 MG/1
150 CAPSULE, EXTENDED RELEASE ORAL DAILY
Qty: 90 CAPSULE | Refills: 1 | Status: SHIPPED | OUTPATIENT
Start: 2019-02-28 | End: 2019-09-11

## 2019-02-28 NOTE — PROGRESS NOTES
Immunotherapy Note: Allergy Shot      Subjective:       Patient ID: Riddhi Thapa is a 48 y.o. female presents for monthly immunotherapy  Tolerated last injection  No New Medications  Beta Blockers: not on beta blocker    Reaction with last injection?: No  If female, are you pregnant?: No  Are you on any beta blockers?: No  Has the vial ?: No    Are you ill today?: No  Asthma exacerbation or symptoms: No  Do you have a fever today?: No    Peak Flow: 400    Expiration Date #1: 19  Vial Concentration: 1:1 v/v (RED)  Dose (mL) #1: 0.4 mL  Location: Right upper arm  Given By: EL    Expiration Date #2: 19  Vial Concentration: 1:1 v/v (RED)  Dose (mL) #2: 0.4 mL  Location: Left upper arm  Given By : EL                           Objective:     Riddhi Thapa observed for 30 minutes and discharged in good condition        Discussion:     Pt understands the current recommendation. All questions answered to the best of my ability. Continue current management plan.      Electronically signed by Lucretia Zuluaga    Allergy/Imunology  [facility]  [place] [address]  [place] [address]   Office [phone]

## 2019-02-28 NOTE — PROGRESS NOTES
Subjective:       Patient ID: Riddhi Thapa is a 48 y.o. female.    Chief Complaint: Anxiety (6 mo. f/u) and Hyperlipidemia (6 mo. f/u )    Thyroid Problem   Presents for follow-up visit. Symptoms include anxiety and depressed mood. Patient reports no cold intolerance, dry skin, fatigue, hair loss, heat intolerance, hoarse voice, nail problem, palpitations, tremors or visual change. The symptoms have been stable. Her past medical history is significant for hyperlipidemia. There is no history of diabetes.   Hyperlipidemia   This is a recurrent problem. The current episode started more than 1 month ago. The problem is uncontrolled. Recent lipid tests were reviewed and are variable. Exacerbating diseases include hypothyroidism and obesity. She has no history of chronic renal disease, diabetes, liver disease or nephrotic syndrome. Factors aggravating her hyperlipidemia include fatty foods. Pertinent negatives include no chest pain, focal weakness, leg pain or shortness of breath. Current antihyperlipidemic treatment includes diet change. The current treatment provides mild improvement of lipids. Compliance problems include adherence to diet and adherence to exercise.  Risk factors for coronary artery disease include stress, dyslipidemia and obesity.   Anxiety   Presents for follow-up visit. Symptoms include depressed mood, excessive worry, irritability and nervous/anxious behavior. Patient reports no chest pain, confusion, nausea, palpitations, shortness of breath or suicidal ideas. Symptoms occur occasionally. The severity of symptoms is mild. The quality of sleep is fair. Nighttime awakenings: occasional.     Compliance with medications is %.   Depression   Visit Type: follow-up  Patient presents with the following symptoms: depressed mood, excessive worry, fatigue, irritability and nervousness/anxiety.  Patient is not experiencing: confusion, palpitations, shortness of breath, suicidal ideas, suicidal  planning and thoughts of death.  Frequency of symptoms: occasionally   Severity: mild   Sleep quality: fair  Nighttime awakenings: occasional  Compliance with medications:  %          Review of Systems   Constitutional: Positive for irritability. Negative for activity change, appetite change, fatigue and fever.        Obesity   HENT: Negative for congestion, ear discharge, ear pain, hoarse voice, sore throat, trouble swallowing and voice change.    Eyes: Negative for photophobia, pain, discharge and visual disturbance.   Respiratory: Negative for cough, chest tightness and shortness of breath.    Cardiovascular: Negative for chest pain and palpitations.        Hyperlipidemia   Gastrointestinal: Negative for abdominal pain, nausea and vomiting.   Endocrine: Negative for cold intolerance and heat intolerance.        Hypothyroid   Genitourinary: Negative for difficulty urinating and dysuria.   Musculoskeletal: Negative for arthralgias and gait problem.   Skin: Negative for rash.   Allergic/Immunologic: Negative for immunocompromised state.   Neurological: Negative for tremors, focal weakness, speech difficulty and headaches.   Psychiatric/Behavioral: Positive for depression and dysphoric mood. Negative for confusion, self-injury and suicidal ideas. The patient is nervous/anxious.        Past Medical History:   Diagnosis Date    Anxiety     Asthma     Depression     GERD (gastroesophageal reflux disease)     Hashimoto's thyroiditis     Hyperlipidemia     Hypothyroidism     Kidney stones       Past Surgical History:   Procedure Laterality Date    EXTRACORPOREAL SHOCK WAVE LITHOTRIPSY  2011    LITHOTRIPSY      SINUS SURGERY  2015    TUBAL LIGATION      Bi-Lat       Family History   Problem Relation Age of Onset    Eczema Mother     Thyroid disease Mother     Cancer Father         Renal    Eczema Father     COPD Maternal Grandmother     Diabetes Maternal Grandmother     Stroke Maternal Grandmother      Thyroid disease Maternal Grandmother     Asthma Neg Hx     Immunodeficiency Neg Hx     Rhinitis Neg Hx        Social History     Socioeconomic History    Marital status:      Spouse name: None    Number of children: None    Years of education: None    Highest education level: None   Social Needs    Financial resource strain: None    Food insecurity - worry: None    Food insecurity - inability: None    Transportation needs - medical: None    Transportation needs - non-medical: None   Occupational History    Occupation: Teacher     Comment: [employer]    Tobacco Use    Smoking status: Never Smoker    Smokeless tobacco: Never Used   Substance and Sexual Activity    Alcohol use: Yes     Comment: weekends only    Drug use: No    Sexual activity: None   Other Topics Concern    None   Social History Narrative    None       Current Outpatient Medications   Medication Sig Dispense Refill    albuterol (PROAIR HFA) 90 mcg/actuation inhaler Inhale 2 puffs into the lungs every 6 (six) hours as needed for Wheezing. 8 g 2    azelastine-fluticasone (DYMISTA) 137-50 mcg/spray Spry nassal spray 1 spray by Each Nare route 2 (two) times daily. 1 Bottle 6    budesonide-formoterol 160-4.5 mcg (SYMBICORT) 160-4.5 mcg/actuation HFAA Inhale 2 puffs into the lungs every 12 (twelve) hours. Controller (Patient taking differently: Inhale 2 puffs into the lungs every 12 (twelve) hours as needed. Controller) 1 Inhaler 3    cetirizine (ZYRTEC) 10 mg Cap Take 1 capsule by mouth daily as needed.      cholecalciferol, vitamin D3, (VITAMIN D3) 1,000 unit capsule Take 1 capsule by mouth Daily.      dexlansoprazole (DEXILANT) 60 mg capsule Take 60 mg by mouth once daily.        montelukast (SINGULAIR) 10 mg tablet Take 1 tablet (10 mg total) by mouth once daily. 90 tablet 6    SYNTHROID 50 mcg tablet TAKE ONE TABLET BY MOUTH EVERY MORNING BEFORE BREAKFAST 30 tablet 2    tiotropium bromide (SPIRIVA RESPIMAT) 1.25  "mcg/actuation Mist Inhale 2 puffs into the lungs once daily. Controller 4 g 6    valacyclovir (VALTREX) 500 MG tablet Take 1 tablet by mouth as needed.      venlafaxine (EFFEXOR-XR) 150 MG Cp24 Take 1 capsule (150 mg total) by mouth once daily. 90 capsule 1     Current Facility-Administered Medications   Medication Dose Route Frequency Provider Last Rate Last Dose    Allergy Mix   Subcutaneous Q30 Days Samara Jones MD           Review of patient's allergies indicates:   Allergen Reactions    Nsaids (non-steroidal anti-inflammatory drug)      causes upset stomach, has GERD     Objective:    HPI     Anxiety      Additional comments: 6 mo. f/u              Hyperlipidemia      Additional comments: 6 mo. f/u           Last edited by Claribel Waggoner LPN on 2/28/2019  3:44 PM. (History)      Blood pressure 122/86, pulse 99, height 5' 4" (1.626 m), weight 88.6 kg (195 lb 6.4 oz), SpO2 99 %. Body mass index is 33.54 kg/m².   Physical Exam   Constitutional: She is oriented to person, place, and time. She appears well-developed. She is cooperative. No distress.   Obesity   HENT:   Head: Normocephalic and atraumatic.   Right Ear: Tympanic membrane normal.   Left Ear: Tympanic membrane normal.   Nose: Nose normal.   Mouth/Throat: Oropharynx is clear and moist.   Eyes: Conjunctivae, EOM and lids are normal. Pupils are equal, round, and reactive to light. Lids are everted and swept, no foreign bodies found. Right pupil is round and reactive. Left pupil is round and reactive.   Neck: Trachea normal and normal range of motion. Neck supple. No thyromegaly present.   Cardiovascular: Normal rate, regular rhythm, S1 normal, S2 normal, normal heart sounds and intact distal pulses.   Pulmonary/Chest: Effort normal and breath sounds normal. She has no wheezes.   Abdominal: Soft. Bowel sounds are normal. There is no rigidity and no guarding.   Musculoskeletal: Normal range of motion.   Lymphadenopathy:     She has no cervical " adenopathy.     She has no axillary adenopathy.   Neurological: She is alert and oriented to person, place, and time.   Skin: Skin is warm and dry. Capillary refill takes less than 2 seconds.   Psychiatric: Her speech is normal and behavior is normal. Judgment and thought content normal. Her mood appears anxious (controlled). Cognition and memory are normal. She exhibits a depressed mood (controlled).   Nursing note and vitals reviewed.          Assessment:       1. Hashimoto's thyroiditis    2. Gastroesophageal reflux disease, esophagitis presence not specified    3. Mixed hyperlipidemia    4. Mixed anxiety depressive disorder    5. Need for influenza vaccination    6. Attention deficit    7. Class 1 obesity due to excess calories with serious comorbidity and body mass index (BMI) of 33.0 to 33.9 in adult        Plan:       Riddhi was seen today for anxiety and hyperlipidemia.    Diagnoses and all orders for this visit:    Hashimoto's thyroiditis  -Have labs completed.   -Continue thyroid medication.    Gastroesophageal reflux disease, esophagitis presence not specified  -Continue dexilant.  -Dietary management.    Mixed hyperlipidemia  -Limit red meat, butter, fried foods, cheese, and other foods that have a lot of saturated fat. Consume more: lean meats, fish, fruits, vegetables, whole grains, beans, lentils, and nuts.  Weight loss, and 30-45 min of cardiovascular exercise daily.     Mixed anxiety depressive disorder  -     venlafaxine (EFFEXOR-XR) 150 MG Cp24; Take 1 capsule (150 mg total) by mouth once daily.    Need for influenza vaccination  -     Influenza - Quadrivalent (3 years & older) (PF)    Attention deficit  -[facility].    Class 1 obesity due to excess calories with serious comorbidity and body mass index (BMI) of 33.0 to 33.9 in adult  -The patient is asked to make an attempt to improve diet and exercise patterns  to aid in medical management of this problem.      Will call with labs.  Follow up in 6 months for thyroid and lipids.

## 2019-02-28 NOTE — PATIENT INSTRUCTIONS
Anxiety Reaction  Anxiety is the feeling we all get when we think something bad might happen. It is a normal response to stress and usually causes only a mild reaction. When anxiety becomes more severe, it can interfere with daily life. In some cases, you may not even be aware of what it is youre anxious about. There may also be a genetic link or it may be a learned behavior in the home.  Both psychological and physical triggers cause stress reaction. It's often a response to fear or emotional stress, real or imagined. This stress may come from home, family, work, or social relationships.  During an anxiety reaction, you may feel:  · Helpless  · Nervous  · Depressed  · Irritable  Your body may show signs of anxiety in many ways. You may experience:  · Dry mouth  · Shakiness  · Dizziness  · Weakness  · Trouble breathing  · Breathing fast (hyperventilating)  · Chest pressure  · Sweating  · Headache  · Nausea  · Diarrhea  · Tiredness  · Inability to sleep  · Sexual problems  Home care  · Try to locate the sources of stress in your life. They may not be obvious. These may include:  ¨ Daily hassles of life (traffic jams, missed appointments, car troubles, etc.)  ¨ Major life changes, both good (new baby, job promotion) and bad (loss of job, loss of loved one)  ¨ Overload: feeling that you have too many responsibilities and can't take care of all of them at once  ¨ Feeling helpless, feeling that your problems are beyond what youre able to solve  · Notice how your body reacts to stress. Learn to listen to your body signals. This will help you take action before the stress becomes severe.  · When you can, do something about the source of your stress. (Avoid hassles, limit the amount of change that happens in your life at one time and take a break when you feel overloaded).  · Unfortunately, many stressful situations can't be avoided. It is necessary to learn how to better manage stress. There are many proven methods  "that will reduce your anxiety. These include simple things like exercise, good nutrition and adequate rest. Also, there are certain techniques that are helpful:  ¨ Relaxation  ¨ Breathing exercises  ¨ Visualization  ¨ Biofeedback  ¨ Meditation  For more information about this, consult your doctor or go to a local bookstore and review the many books and tapes available on this subject.  Follow-up care  If you feel that your anxiety is not responding to self-help measures, contact your doctor or make an appointment with a counselor. You may need short-term psychological counseling and temporary medicine to help you manage stress.  Call 911  Call your healthcare provider right away if any of these occur:  · Trouble breathing  · Confusion  · Drowsiness or trouble wakening  · Fainting or loss of consciousness  · Rapid heart rate  · Seizure  · New chest pain that becomes more severe, lasts longer, or spreads into your shoulder, arm, neck, jaw, or back  When to seek medical advice  Call your healthcare provider right away if any of these occur:  · Your symptoms get worse  · Severe headache not relieved by rest and mild pain reliever  Date Last Reviewed: 9/29/2015  © 4669-4122 [employer]. [address] [place] [address]. All rights reserved. This information is not intended as a substitute for professional medical care. Always follow your healthcare professional's instructions.        Facts About Dietary Fat     Olive oil is a good source of unsaturated fat.     Eating less saturated and trans fat is one of the best things you can do for your heart. Start by finding out which fats are better to use. Then always try to use as little "bad" fat as you can.  Why eat less fat?  · Cutting down on the fat you eat can lower your blood cholesterol levels. This may help prevent clogged arteries from buildup of plaque.  · A low-fat diet can help you lose excess weight. Doing so can lower your blood pressure " and reduce your chances of getting diabetes.  · A low-fat diet reduces your risk for stroke and for some cancers.  Unsaturated fat is most healthy  · When you must add fat, use unsaturated fat.  · Unsaturated fats come from plants. They include olive, canola, peanut, corn, avocado, safflower, and sunflower oils.  · Liquid (squeezable) margarine is also mostly unsaturated fat.  · In moderate amounts, unsaturated fat can even be good for your heart.  Saturated fat is less healthy  · Avoid eating saturated fat because it raises your blood cholesterol levels.  · Most saturated fat comes from animals. Foods such as butter, lard, cheese, cream, whole milk, and fatty cuts of meat are high in saturated fat.  · Some oils, such as palm and coconut oils, are also saturated fats.  Trans fat is least healthy  · Also avoid trans fat whenever possible. Even if it's not listed on the food label, look for it in the ingredients in the form of hydrogenated or partially hydrogenated oils.  · This is found in snack foods, shortening, french fries, and stick margarines.  Add flavor without fat  · Sprinkle herbs on fish, chicken, and meat, and in soups.  · Try herbs, lemon juice, or flavored vinegar on vegetables.  · Add chopped onions, garlic, and peppers to flavor beans and rice.   Date Last Reviewed: 5/11/2015  © 7482-6630 [employer]. [address] [place] [address], [place] [address]. All rights reserved. This information is not intended as a substitute for professional medical care. Always follow your healthcare professional's instructions.        Understanding Food and Cholesterol  Having a high cholesterol level puts you at risk for heart disease and other health problems. What you eat has a big effect on your bodys cholesterol level. Eating certain foods can raise your cholesterol. Other foods can help you lower it. Watching what you eat can help you get your cholesterol level under control.  Know which foods are high in  saturated fat and trans fat  Foods high in saturated fat and trans fat can raise your LDL (bad) cholesterol. Its important to knowing which foods are high in these fats, and eat less of them. This can help you manage your cholesterol levels.  Foods high in these fats are:  · Animal products, including beef, lamb, pork, and poultry with skin on  · Cold cuts, varela, sausage  · Creamy sauces and fatty gravies  · Cookies, donuts, muffins, and pastries  · Fried foods  · Shortening, butter, coconut oil, palm oil, cocoa butter, partially hydrogenated oils (read labels)  · High-fat dairy products such as whole milk, cheese, cream cheese, and ice cream  Better choices:  · Lean beef, skinless white-meat poultry, fish  · Tomato sauce, vegetable puree  · Dried fruit, bagels, bread with jam  · Baked, broiled, steamed, or roasted foods  · Soft (tub) margarine, canola oil, and olive oil in moderate amounts  · Low-fat or nonfat dairy products, such as 1% or fat-free milk, and reduced-fat cheese  Use fiber to help control cholesterol  Foods high in fiber can help you keep your cholesterol down. Good sources of fiber are:  · Oats  · Barley  · Whole grains  · Beans  · Vegetables  · Cornmeal  · Popcorn  · Berries, apples, other fruits  Date Last Reviewed: 8/10/2015  © 6990-9612 [employer] [address] [place] [address], [place] [address]. All rights reserved. This information is not intended as a substitute for professional medical care. Always follow your healthcare professional's instructions.

## 2019-03-14 ENCOUNTER — DOCUMENTATION ONLY (OUTPATIENT)
Dept: ALLERGY | Facility: CLINIC | Age: 49
End: 2019-03-14
Payer: COMMERCIAL

## 2019-03-14 DIAGNOSIS — J45.40 MODERATE PERSISTENT EXTRINSIC ASTHMA WITHOUT COMPLICATION: ICD-10-CM

## 2019-03-14 DIAGNOSIS — J30.81 ALLERGIC RHINITIS DUE TO ANIMAL HAIR AND DANDER: ICD-10-CM

## 2019-03-14 PROCEDURE — 95165 ANTIGEN THERAPY SERVICES: CPT | Mod: ,,, | Performed by: ALLERGY & IMMUNOLOGY

## 2019-03-14 PROCEDURE — 95165 PR PROFES SVC,IMMUNOTHER,SINGLE/MULT AGS: ICD-10-PCS | Mod: ,,, | Performed by: ALLERGY & IMMUNOLOGY

## 2019-03-14 NOTE — PROGRESS NOTES
Using 70% alcohol clean surface was prepped. Sterile drape then covered clean space.  Hands were scrubbed and placed into sterile gloves. Gown, mask, and hair net worn according to  guidelines allergist exemption.    Mixed 3-    Vial 1 and vial 2

## 2019-03-29 LAB
CHOLEST SERPL-MCNC: 183 MG/DL
CHOLEST/HDLC SERPL: 3 (CALC)
HDLC SERPL-MCNC: 61 MG/DL
LDLC SERPL CALC-MCNC: 98 MG/DL (CALC)
NONHDLC SERPL-MCNC: 122 MG/DL (CALC)
TRIGL SERPL-MCNC: 141 MG/DL
TSH SERPL-ACNC: 1.48 MIU/L

## 2019-04-01 ENCOUNTER — PATIENT MESSAGE (OUTPATIENT)
Dept: FAMILY MEDICINE | Facility: CLINIC | Age: 49
End: 2019-04-01

## 2019-06-24 ENCOUNTER — PATIENT MESSAGE (OUTPATIENT)
Dept: FAMILY MEDICINE | Facility: CLINIC | Age: 49
End: 2019-06-24

## 2019-06-24 DIAGNOSIS — J45.41 MODERATE PERSISTENT ASTHMA WITH ACUTE EXACERBATION: ICD-10-CM

## 2019-06-24 DIAGNOSIS — E06.3 HASHIMOTO'S THYROIDITIS: ICD-10-CM

## 2019-06-24 RX ORDER — MONTELUKAST SODIUM 10 MG/1
TABLET ORAL
Qty: 30 TABLET | Refills: 5 | Status: SHIPPED | OUTPATIENT
Start: 2019-06-24 | End: 2019-07-30 | Stop reason: SDUPTHER

## 2019-06-24 RX ORDER — LEVOTHYROXINE SODIUM 50 UG/1
TABLET ORAL
Qty: 30 TABLET | Refills: 2 | Status: SHIPPED | OUTPATIENT
Start: 2019-06-24 | End: 2019-09-11 | Stop reason: SDUPTHER

## 2019-07-30 ENCOUNTER — CLINICAL SUPPORT (OUTPATIENT)
Dept: ALLERGY | Facility: CLINIC | Age: 49
End: 2019-07-30
Payer: COMMERCIAL

## 2019-07-30 VITALS
WEIGHT: 178 LBS | SYSTOLIC BLOOD PRESSURE: 114 MMHG | DIASTOLIC BLOOD PRESSURE: 76 MMHG | HEIGHT: 64 IN | BODY MASS INDEX: 30.39 KG/M2

## 2019-07-30 DIAGNOSIS — J30.1 NON-SEASONAL ALLERGIC RHINITIS DUE TO POLLEN: ICD-10-CM

## 2019-07-30 DIAGNOSIS — E06.3 HASHIMOTO'S THYROIDITIS: ICD-10-CM

## 2019-07-30 DIAGNOSIS — J45.41 MODERATE PERSISTENT ASTHMA WITH ACUTE EXACERBATION: ICD-10-CM

## 2019-07-30 DIAGNOSIS — J30.81 ALLERGIC RHINITIS DUE TO ANIMAL HAIR AND DANDER: Primary | ICD-10-CM

## 2019-07-30 PROCEDURE — 95117 PR IMMU2THERAPY, 2+ INJECTIONS: ICD-10-PCS | Mod: S$GLB,,, | Performed by: ALLERGY & IMMUNOLOGY

## 2019-07-30 PROCEDURE — 95117 IMMUNOTHERAPY INJECTIONS: CPT | Mod: S$GLB,,, | Performed by: ALLERGY & IMMUNOLOGY

## 2019-07-30 RX ORDER — MONTELUKAST SODIUM 10 MG/1
10 TABLET ORAL DAILY
Qty: 30 TABLET | Refills: 1 | Status: SHIPPED | OUTPATIENT
Start: 2019-07-30 | End: 2020-03-17

## 2019-07-30 RX ORDER — ALBUTEROL SULFATE 90 UG/1
2 AEROSOL, METERED RESPIRATORY (INHALATION) EVERY 6 HOURS PRN
Qty: 1 INHALER | Refills: 2 | Status: SHIPPED | OUTPATIENT
Start: 2019-07-30 | End: 2021-04-19

## 2019-07-30 RX ORDER — AZELASTINE HYDROCHLORIDE, FLUTICASONE PROPIONATE 137; 50 UG/1; UG/1
1 SPRAY, METERED NASAL 2 TIMES DAILY
Qty: 1 BOTTLE | Refills: 1 | Status: SHIPPED | OUTPATIENT
Start: 2019-07-30 | End: 2020-07-01 | Stop reason: SDUPTHER

## 2019-07-30 RX ORDER — BUDESONIDE AND FORMOTEROL FUMARATE DIHYDRATE 160; 4.5 UG/1; UG/1
2 AEROSOL RESPIRATORY (INHALATION) EVERY 12 HOURS
Qty: 1 INHALER | Refills: 3 | Status: SHIPPED | OUTPATIENT
Start: 2019-07-30 | End: 2021-04-19

## 2019-07-30 NOTE — PROGRESS NOTES
Immunotherapy Note: Allergy Shot      Subjective:       Patient ID: Riddhi Thapa is a 49 y.o. female presents for weekly immunotherapy  Tolerated last injection  No New Medications  Beta Blockers: not on beta blocker    Reaction with last injection?: No  If female, are you pregnant?: No  Are you on any beta blockers?: No  Has the vial ?: No    Are you ill today?: No  Asthma exacerbation or symptoms: No  Do you have a fever today?: No         Expiration Date #1: 20  Vial Concentration: 1:1 v/v (RED)  Dose (mL) #1: 0.1 mL  Location: Right upper arm  Given By: EL    Expiration Date #2: 20  Vial Concentration: 1:1 v/v (RED)  Dose (mL) #2: 0.1 mL  Location: Left upper arm  Given By : EL                           Objective:     Riddhi Thapa observed for 30 minutes and discharged in good condition        Discussion:     Pt understands the current recommendation. All questions answered to the best of my ability. Continue current management plan.      Electronically signed by Samara Jones    Allergy/Imunology  [facility]  [place] [address]  [place] [address]   Office [phone]

## 2019-08-01 ENCOUNTER — CLINICAL SUPPORT (OUTPATIENT)
Dept: ALLERGY | Facility: CLINIC | Age: 49
End: 2019-08-01
Payer: COMMERCIAL

## 2019-08-01 ENCOUNTER — PATIENT MESSAGE (OUTPATIENT)
Dept: FAMILY MEDICINE | Facility: CLINIC | Age: 49
End: 2019-08-01

## 2019-08-01 VITALS
HEIGHT: 64 IN | WEIGHT: 178 LBS | DIASTOLIC BLOOD PRESSURE: 80 MMHG | SYSTOLIC BLOOD PRESSURE: 124 MMHG | BODY MASS INDEX: 30.39 KG/M2

## 2019-08-01 DIAGNOSIS — E06.3 HASHIMOTO'S THYROIDITIS: Primary | ICD-10-CM

## 2019-08-01 DIAGNOSIS — E78.2 MIXED HYPERLIPIDEMIA: ICD-10-CM

## 2019-08-01 DIAGNOSIS — J30.81 ALLERGIC RHINITIS DUE TO ANIMAL HAIR AND DANDER: ICD-10-CM

## 2019-08-01 PROCEDURE — 95117 PR IMMU2THERAPY, 2+ INJECTIONS: ICD-10-PCS | Mod: S$GLB,,, | Performed by: ALLERGY & IMMUNOLOGY

## 2019-08-01 PROCEDURE — 95117 IMMUNOTHERAPY INJECTIONS: CPT | Mod: S$GLB,,, | Performed by: ALLERGY & IMMUNOLOGY

## 2019-08-01 NOTE — PROGRESS NOTES
Immunotherapy Note: Allergy Shot      Subjective:       Patient ID: Riddhi Thapa is a 49 y.o. female presents for weekly immunotherapy  Tolerated last injection  No New Medications  Beta Blockers: not on beta blocker    Reaction with last injection?: No  If female, are you pregnant?: No  Are you on any beta blockers?: No  Has the vial ?: No    Are you ill today?: No  Asthma exacerbation or symptoms: No  Do you have a fever today?: No    Peak Flow: 450    Expiration Date #1: 20  Vial Concentration: 1:1 v/v (RED)  Dose (mL) #1: 0.2 mL  Location: Right upper arm  Given By: EL    Expiration Date #2: 20  Vial Concentration: 1:1 v/v (RED)  Dose (mL) #2: 0.2 mL  Location: Left upper arm  Given By : EL                           Objective:     Riddhi Thapa observed for 30 minutes and discharged in good condition        Discussion:     Pt understands the current recommendation. All questions answered to the best of my ability. Continue current management plan.      Electronically signed by Lucretia Zuluaga    Allergy/Imunology  [facility]  [place] [address]  [place] [address]   Office [phone]

## 2019-08-05 ENCOUNTER — CLINICAL SUPPORT (OUTPATIENT)
Dept: ALLERGY | Facility: CLINIC | Age: 49
End: 2019-08-05
Payer: COMMERCIAL

## 2019-08-05 VITALS
SYSTOLIC BLOOD PRESSURE: 116 MMHG | DIASTOLIC BLOOD PRESSURE: 70 MMHG | HEIGHT: 64 IN | WEIGHT: 178 LBS | BODY MASS INDEX: 30.39 KG/M2

## 2019-08-05 DIAGNOSIS — J30.81 ALLERGIC RHINITIS DUE TO ANIMAL HAIR AND DANDER: Primary | ICD-10-CM

## 2019-08-05 PROCEDURE — 95117 PR IMMU2THERAPY, 2+ INJECTIONS: ICD-10-PCS | Mod: S$GLB,,, | Performed by: ALLERGY & IMMUNOLOGY

## 2019-08-05 PROCEDURE — 95117 IMMUNOTHERAPY INJECTIONS: CPT | Mod: S$GLB,,, | Performed by: ALLERGY & IMMUNOLOGY

## 2019-08-05 NOTE — PROGRESS NOTES
Immunotherapy Note: Allergy Shot      Subjective:       Patient ID: Riddhi Thapa is a 49 y.o. female presents for weekly immunotherapy  Tolerated last injection  No New Medications  Beta Blockers: not on beta blocker    Reaction with last injection?: No  If female, are you pregnant?: No  Are you on any beta blockers?: No  Has the vial ?: No    Are you ill today?: No  Asthma exacerbation or symptoms: No  Do you have a fever today?: No         Expiration Date #1: 20  Vial Concentration: 1:1 v/v (RED)  Dose (mL) #1: 0.3 mL  Given By: EL    Expiration Date #2: 20  Vial Concentration: 1:1 v/v (RED)  Dose (mL) #2: 0.3 mL  Given By : EL                           Objective:     Riddhi Thapa observed for 30 minutes and discharged in good condition        Discussion:     Pt understands the current recommendation. All questions answered to the best of my ability. Continue current management plan.      Electronically signed by Samara Jones    Allergy/Imunology  [facility]  [place] [address]  [place] [address]   Office [phone]

## 2019-08-12 ENCOUNTER — CLINICAL SUPPORT (OUTPATIENT)
Dept: ALLERGY | Facility: CLINIC | Age: 49
End: 2019-08-12
Payer: COMMERCIAL

## 2019-08-12 VITALS
SYSTOLIC BLOOD PRESSURE: 118 MMHG | HEIGHT: 64 IN | WEIGHT: 178 LBS | DIASTOLIC BLOOD PRESSURE: 76 MMHG | BODY MASS INDEX: 30.39 KG/M2

## 2019-08-12 DIAGNOSIS — J30.81 ALLERGIC RHINITIS DUE TO ANIMAL HAIR AND DANDER: ICD-10-CM

## 2019-08-12 PROCEDURE — 95117 PR IMMU2THERAPY, 2+ INJECTIONS: ICD-10-PCS | Mod: S$GLB,,, | Performed by: ALLERGY & IMMUNOLOGY

## 2019-08-12 PROCEDURE — 95117 IMMUNOTHERAPY INJECTIONS: CPT | Mod: S$GLB,,, | Performed by: ALLERGY & IMMUNOLOGY

## 2019-09-05 LAB
CHOLEST SERPL-MCNC: 171 MG/DL
CHOLEST/HDLC SERPL: 3.6 (CALC)
HDLC SERPL-MCNC: 48 MG/DL
LDLC SERPL CALC-MCNC: 99 MG/DL (CALC)
NONHDLC SERPL-MCNC: 123 MG/DL (CALC)
TRIGL SERPL-MCNC: 147 MG/DL
TSH SERPL-ACNC: 0.72 MIU/L

## 2019-09-11 ENCOUNTER — OFFICE VISIT (OUTPATIENT)
Dept: FAMILY MEDICINE | Facility: CLINIC | Age: 49
End: 2019-09-11
Payer: COMMERCIAL

## 2019-09-11 VITALS
WEIGHT: 172 LBS | SYSTOLIC BLOOD PRESSURE: 122 MMHG | DIASTOLIC BLOOD PRESSURE: 80 MMHG | OXYGEN SATURATION: 100 % | HEIGHT: 64 IN | BODY MASS INDEX: 29.37 KG/M2 | HEART RATE: 88 BPM

## 2019-09-11 DIAGNOSIS — E06.3 HASHIMOTO'S THYROIDITIS: Primary | ICD-10-CM

## 2019-09-11 DIAGNOSIS — E78.2 MIXED HYPERLIPIDEMIA: ICD-10-CM

## 2019-09-11 DIAGNOSIS — F41.8 MIXED ANXIETY DEPRESSIVE DISORDER: ICD-10-CM

## 2019-09-11 DIAGNOSIS — E66.3 OVERWEIGHT: ICD-10-CM

## 2019-09-11 DIAGNOSIS — E78.6 LOW HDL (UNDER 40): ICD-10-CM

## 2019-09-11 DIAGNOSIS — K21.9 GASTROESOPHAGEAL REFLUX DISEASE, ESOPHAGITIS PRESENCE NOT SPECIFIED: ICD-10-CM

## 2019-09-11 DIAGNOSIS — F98.8 ATTENTION DEFICIT DISORDER (ADD) WITHOUT HYPERACTIVITY: ICD-10-CM

## 2019-09-11 PROCEDURE — 3008F PR BODY MASS INDEX (BMI) DOCUMENTED: ICD-10-PCS | Mod: S$GLB,,, | Performed by: NURSE PRACTITIONER

## 2019-09-11 PROCEDURE — 99999 PR PBB SHADOW E&M-EST. PATIENT-LVL IV: ICD-10-PCS | Mod: PBBFAC,,, | Performed by: NURSE PRACTITIONER

## 2019-09-11 PROCEDURE — 99214 OFFICE O/P EST MOD 30 MIN: CPT | Mod: S$GLB,,, | Performed by: NURSE PRACTITIONER

## 2019-09-11 PROCEDURE — 99214 PR OFFICE/OUTPT VISIT, EST, LEVL IV, 30-39 MIN: ICD-10-PCS | Mod: S$GLB,,, | Performed by: NURSE PRACTITIONER

## 2019-09-11 PROCEDURE — 3008F BODY MASS INDEX DOCD: CPT | Mod: S$GLB,,, | Performed by: NURSE PRACTITIONER

## 2019-09-11 PROCEDURE — 99999 PR PBB SHADOW E&M-EST. PATIENT-LVL IV: CPT | Mod: PBBFAC,,, | Performed by: NURSE PRACTITIONER

## 2019-09-11 RX ORDER — DEXTROAMPHETAMINE SACCHARATE, AMPHETAMINE ASPARTATE MONOHYDRATE, DEXTROAMPHETAMINE SULFATE AND AMPHETAMINE SULFATE 7.5; 7.5; 7.5; 7.5 MG/1; MG/1; MG/1; MG/1
30 CAPSULE, EXTENDED RELEASE ORAL EVERY MORNING
COMMUNITY

## 2019-09-11 RX ORDER — LEVOTHYROXINE SODIUM 50 UG/1
50 TABLET ORAL
Qty: 90 TABLET | Refills: 1 | Status: SHIPPED | OUTPATIENT
Start: 2019-09-11 | End: 2020-03-11 | Stop reason: SDUPTHER

## 2019-09-11 NOTE — PROGRESS NOTES
Subjective:       Patient ID: Riddhi Thapa is a 49 y.o. female.    Chief Complaint: Thyroid Problem    Patient presents today following up for her 6 month checkup on chronic conditions including anxiety, depressed mood, hyperlipidemia, and hypothyroidism.  Patient has lost about 20 lb since her last visit 6 months ago.  Patient is off of her Effexor completely that she was taking for anxiety depression.  Patient felt she did not need the medication any more and weaned herself from this medication.  Patient is now taking Adderall 30 mg extended release daily that she is taking for attention deficit disorder.  Patient is tolerating this well and feels much better since taking this medication and being off the Effexor.  Patient also has asthma, GERD, and chronic allergic rhinitis.  Patient is medicated and tolerating these medications well without side effect.  Labs have been drawn and will be reviewed today at her office visit.  Patient is overweight with BMI 29.52.    Anxiety   Presents for follow-up visit. Symptoms include decreased concentration, depressed mood, excessive worry, irritability and nervous/anxious behavior. Patient reports no chest pain, confusion, nausea, palpitations, shortness of breath or suicidal ideas. Symptoms occur occasionally. The severity of symptoms is mild. The quality of sleep is fair. Nighttime awakenings: occasional.     Compliance with medications is %.   Hyperlipidemia   This is a recurrent problem. The current episode started more than 1 month ago. The problem is uncontrolled. Recent lipid tests were reviewed and are variable. Exacerbating diseases include hypothyroidism and obesity. She has no history of chronic renal disease, diabetes, liver disease or nephrotic syndrome. Factors aggravating her hyperlipidemia include fatty foods. Pertinent negatives include no chest pain, focal weakness, leg pain or shortness of breath. Current antihyperlipidemic treatment includes diet  change. The current treatment provides mild improvement of lipids. Compliance problems include adherence to diet and adherence to exercise.  Risk factors for coronary artery disease include stress, dyslipidemia and obesity.   Thyroid Problem   Presents for follow-up visit. Symptoms include anxiety and depressed mood. Patient reports no cold intolerance, dry skin, fatigue, hair loss, heat intolerance, hoarse voice, nail problem, palpitations, tremors or visual change. The symptoms have been stable. Her past medical history is significant for hyperlipidemia. There is no history of diabetes.   Depression   Visit Type: follow-up  Patient presents with the following symptoms: decreased concentration, depressed mood, excessive worry, fatigue, irritability and nervousness/anxiety.  Patient is not experiencing: confusion, palpitations, shortness of breath, suicidal ideas, suicidal planning and thoughts of death.  Frequency of symptoms: rarely   Severity: mild   Sleep quality: fair  Nighttime awakenings: occasional  Compliance with medications:  %          Review of Systems   Constitutional: Positive for irritability. Negative for activity change, appetite change, fatigue and fever.        Obesity   HENT: Negative for congestion, ear discharge, ear pain, hoarse voice, sore throat, trouble swallowing and voice change.    Eyes: Negative for photophobia, pain, discharge and visual disturbance.   Respiratory: Negative for cough, chest tightness and shortness of breath.    Cardiovascular: Negative for chest pain and palpitations.        Hyperlipidemia   Gastrointestinal: Negative for abdominal pain, nausea and vomiting.        GERD   Endocrine: Negative for cold intolerance and heat intolerance.        Hypothyroid   Genitourinary: Negative for difficulty urinating and dysuria.   Musculoskeletal: Negative for arthralgias and gait problem.   Skin: Negative for rash.   Allergic/Immunologic: Negative for immunocompromised  state.   Neurological: Negative for tremors, focal weakness, speech difficulty and headaches.   Psychiatric/Behavioral: Positive for decreased concentration, depression and dysphoric mood. Negative for confusion, self-injury and suicidal ideas. The patient is nervous/anxious.        Past Medical History:   Diagnosis Date    Anxiety     Asthma     Depression     GERD (gastroesophageal reflux disease)     Hashimoto's thyroiditis     Hyperlipidemia     Hypothyroidism     Kidney stones       Past Surgical History:   Procedure Laterality Date    EXTRACORPOREAL SHOCK WAVE LITHOTRIPSY  2011    LITHOTRIPSY      SINUS SURGERY  2015    TUBAL LIGATION      Bi-Lat       Family History   Problem Relation Age of Onset    Eczema Mother     Thyroid disease Mother     Cancer Father         Renal    Eczema Father     COPD Maternal Grandmother     Diabetes Maternal Grandmother     Stroke Maternal Grandmother     Thyroid disease Maternal Grandmother     Asthma Neg Hx     Immunodeficiency Neg Hx     Rhinitis Neg Hx        Social History     Socioeconomic History    Marital status:      Spouse name: Not on file    Number of children: Not on file    Years of education: Not on file    Highest education level: Not on file   Occupational History    Occupation: Teacher     Comment: [employer]    Social Needs    Financial resource strain: Not on file    Food insecurity:     Worry: Not on file     Inability: Not on file    Transportation needs:     Medical: Not on file     Non-medical: Not on file   Tobacco Use    Smoking status: Never Smoker    Smokeless tobacco: Never Used   Substance and Sexual Activity    Alcohol use: Yes     Comment: weekends only    Drug use: No    Sexual activity: Not on file   Lifestyle    Physical activity:     Days per week: Not on file     Minutes per session: Not on file    Stress: Not at all   Relationships    Social connections:     Talks on phone: Not on file      "Gets together: Not on file     Attends Anabaptist service: Not on file     Active member of club or organization: Not on file     Attends meetings of clubs or organizations: Not on file     Relationship status: Not on file   Other Topics Concern    Not on file   Social History Narrative    Not on file       Current Outpatient Medications   Medication Sig Dispense Refill    albuterol (PROAIR HFA) 90 mcg/actuation inhaler Inhale 2 puffs into the lungs every 6 (six) hours as needed for Wheezing. 1 Inhaler 2    azelastine-fluticasone (DYMISTA) 137-50 mcg/spray Spry nassal spray 1 spray by Each Nostril route 2 (two) times daily. 1 Bottle 1    budesonide-formoterol 160-4.5 mcg (SYMBICORT) 160-4.5 mcg/actuation HFAA Inhale 2 puffs into the lungs every 12 (twelve) hours. Controller 1 Inhaler 3    cetirizine (ZYRTEC) 10 mg Cap Take 1 capsule by mouth daily as needed.      cholecalciferol, vitamin D3, (VITAMIN D3) 1,000 unit capsule Take 1 capsule by mouth Daily.      dexlansoprazole (DEXILANT) 60 mg capsule Take 60 mg by mouth once daily.        dextroamphetamine-amphetamine (ADDERALL XR) 30 MG 24 hr capsule Take 30 mg by mouth every morning.      montelukast (SINGULAIR) 10 mg tablet Take 1 tablet (10 mg total) by mouth once daily. 30 tablet 1    SYNTHROID 50 mcg tablet Take 1 tablet (50 mcg total) by mouth before breakfast. 90 tablet 1    valacyclovir (VALTREX) 500 MG tablet Take 1 tablet by mouth as needed.       Current Facility-Administered Medications   Medication Dose Route Frequency Provider Last Rate Last Dose    Allergy Mix   Subcutaneous Q30 Days Samara Jones MD           Review of patient's allergies indicates:   Allergen Reactions    Nsaids (non-steroidal anti-inflammatory drug)      causes upset stomach, has GERD     Objective:      Blood pressure 122/80, pulse 88, height 5' 4" (1.626 m), weight 78 kg (172 lb), SpO2 100 %. Body mass index is 29.52 kg/m².   Physical Exam   Constitutional: She is " oriented to person, place, and time. She appears well-developed. She is cooperative. No distress.   Obesity   HENT:   Head: Normocephalic and atraumatic.   Right Ear: Tympanic membrane and external ear normal.   Left Ear: Tympanic membrane and external ear normal.   Nose: Nose normal.   Mouth/Throat: Oropharynx is clear and moist.   Eyes: Pupils are equal, round, and reactive to light. Conjunctivae, EOM and lids are normal. Lids are everted and swept, no foreign bodies found. Right pupil is round and reactive. Left pupil is round and reactive.   Neck: Trachea normal and normal range of motion. Neck supple. No thyromegaly present.   Cardiovascular: Normal rate, regular rhythm, S1 normal, S2 normal, normal heart sounds and intact distal pulses.   No murmur heard.  Pulmonary/Chest: Effort normal and breath sounds normal. No respiratory distress. She has no wheezes.   Abdominal: Soft. Bowel sounds are normal. There is no rigidity and no guarding.   Musculoskeletal: Normal range of motion.   Lymphadenopathy:     She has no cervical adenopathy.     She has no axillary adenopathy.   Neurological: She is alert and oriented to person, place, and time.   Skin: Skin is warm and dry. Capillary refill takes less than 2 seconds.   Psychiatric: Her speech is normal and behavior is normal. Judgment and thought content normal. Her mood appears anxious (controlled). Cognition and memory are normal. She exhibits a depressed mood (controlled).   Attention deficit controlled   Nursing note and vitals reviewed.          Assessment:       1. Hashimoto's thyroiditis    2. Mixed anxiety depressive disorder    3. Mixed hyperlipidemia    4. Low HDL (under 40)    5. Attention deficit disorder (ADD) without hyperactivity    6. Gastroesophageal reflux disease, esophagitis presence not specified    7. Overweight        Plan:       Riddhi was seen today for thyroid problem.    Diagnoses and all orders for this visit:    Hashimoto's  thyroiditis  -     SYNTHROID 50 mcg tablet; Take 1 tablet (50 mcg total) by mouth before breakfast.  -     TSH; Future  -     TSH  Thyroid is normal.  Labs are within normal range.  Continue current dose.    Mixed anxiety depressive disorder  Controlled without Effexor.  Continue current regimen of lifestyle changes to control.    Mixed hyperlipidemia  -     Lipid panel; Future  -     Lipid panel  -Limit red meat, butter, fried foods, cheese, and other foods that have a lot of saturated fat. Consume more: lean meats, fish, fruits, vegetables, whole grains, beans, lentils, and nuts.  Weight loss, and 30-45 min of cardiovascular exercise daily.     Low HDL (under 40)  -     Lipid panel; Future  -     Lipid panel  Dietary suggestions made to correct    Attention deficit disorder (ADD) without hyperactivity  Continue Adderall as being prescribed.  Controlled.    Gastroesophageal reflux disease, esophagitis presence not specified  Continue Dexilant.  Controlled.    Overweight  The patient is asked to make an attempt to improve diet and exercise patterns to aid in medical management of this problem.    Follow-up in 6 months for mild hyperlipidemia, anxiety, and hypothyroidism.  Labs prior to office visit.

## 2019-09-11 NOTE — PATIENT INSTRUCTIONS
Medicines for Acid Reflux  Your healthcare provider has told you that you have acid reflux. This condition causes stomach acid to wash up into your throat. For most people, acid reflux is troubling but not dangerous. But left untreated, acid reflux sometimes damages the esophagus. Medicines can help control acid reflux and limit your risk of future problems.  Medicines for acid reflux  Your healthcare provider may prescribe medicine to help treat your acid reflux. Medicine will be based on your symptoms and any test results. Your provider will explain how to take your medicine. You will also be told about possible side effects.  Reducing stomach acid  Your provider may suggest antacids that you can buy over the counter. Antacids can give fast relief. Or you may be told to take a type of medicine called H2 blockers. These are available over the counter and by prescription (for higher doses).  Blocking stomach acid  In more severe cases, your healthcare provider may suggest stronger medicines such as proton pump inhibitors (PPIs). These keep the stomach from making acid. They are often prescribed for long-term use.  Other medicines  In some cases medicines to reduce or block stomach acid may not work. Then you may be switched to another type of medicine that helps your stomach empty better.     Date Last Reviewed: 10/1/2016  © 1760-9766 [employer]. [address] [place] [address], [place] [address]. All rights reserved. This information is not intended as a substitute for professional medical care. Always follow your healthcare professional's instructions.        Anxiety Reaction  Anxiety is the feeling we all get when we think something bad might happen. It is a normal response to stress and usually causes only a mild reaction. When anxiety becomes more severe, it can interfere with daily life. In some cases, you may not even be aware of what it is youre anxious about. There may also be a genetic link or it  may be a learned behavior in the home.  Both psychological and physical triggers cause stress reaction. It's often a response to fear or emotional stress, real or imagined. This stress may come from home, family, work, or social relationships.  During an anxiety reaction, you may feel:  · Helpless  · Nervous  · Depressed  · Irritable  Your body may show signs of anxiety in many ways. You may experience:  · Dry mouth  · Shakiness  · Dizziness  · Weakness  · Trouble breathing  · Breathing fast (hyperventilating)  · Chest pressure  · Sweating  · Headache  · Nausea  · Diarrhea  · Tiredness  · Inability to sleep  · Sexual problems  Home care  · Try to locate the sources of stress in your life. They may not be obvious. These may include:  ¨ Daily hassles of life (traffic jams, missed appointments, car troubles, etc.)  ¨ Major life changes, both good (new baby, job promotion) and bad (loss of job, loss of loved one)  ¨ Overload: feeling that you have too many responsibilities and can't take care of all of them at once  ¨ Feeling helpless, feeling that your problems are beyond what youre able to solve  · Notice how your body reacts to stress. Learn to listen to your body signals. This will help you take action before the stress becomes severe.  · When you can, do something about the source of your stress. (Avoid hassles, limit the amount of change that happens in your life at one time and take a break when you feel overloaded).  · Unfortunately, many stressful situations can't be avoided. It is necessary to learn how to better manage stress. There are many proven methods that will reduce your anxiety. These include simple things like exercise, good nutrition and adequate rest. Also, there are certain techniques that are helpful:  ¨ Relaxation  ¨ Breathing exercises  ¨ Visualization  ¨ Biofeedback  ¨ Meditation  For more information about this, consult your doctor or go to a local bookstore and review the many books and  tapes available on this subject.  Follow-up care  If you feel that your anxiety is not responding to self-help measures, contact your doctor or make an appointment with a counselor. You may need short-term psychological counseling and temporary medicine to help you manage stress.  Call 911  Call your healthcare provider right away if any of these occur:  · Trouble breathing  · Confusion  · Drowsiness or trouble wakening  · Fainting or loss of consciousness  · Rapid heart rate  · Seizure  · New chest pain that becomes more severe, lasts longer, or spreads into your shoulder, arm, neck, jaw, or back  When to seek medical advice  Call your healthcare provider right away if any of these occur:  · Your symptoms get worse  · Severe headache not relieved by rest and mild pain reliever  Date Last Reviewed: 9/29/2015  © 0667-9994 [employer]. [address] [place] [address], [place] [address]. All rights reserved. This information is not intended as a substitute for professional medical care. Always follow your healthcare professional's instructions.

## 2019-09-17 ENCOUNTER — CLINICAL SUPPORT (OUTPATIENT)
Dept: PULMONOLOGY | Facility: CLINIC | Age: 49
End: 2019-09-17
Payer: COMMERCIAL

## 2019-09-17 VITALS
HEIGHT: 64 IN | SYSTOLIC BLOOD PRESSURE: 128 MMHG | OXYGEN SATURATION: 98 % | BODY MASS INDEX: 29.19 KG/M2 | WEIGHT: 171 LBS | HEART RATE: 90 BPM | DIASTOLIC BLOOD PRESSURE: 74 MMHG

## 2019-09-17 DIAGNOSIS — J30.81 ALLERGIC RHINITIS DUE TO ANIMAL HAIR AND DANDER: Primary | ICD-10-CM

## 2019-09-17 PROCEDURE — 95117 PR IMMU2THERAPY, 2+ INJECTIONS: ICD-10-PCS | Mod: S$GLB,,, | Performed by: ALLERGY & IMMUNOLOGY

## 2019-09-17 PROCEDURE — 95117 IMMUNOTHERAPY INJECTIONS: CPT | Mod: S$GLB,,, | Performed by: ALLERGY & IMMUNOLOGY

## 2019-09-17 NOTE — PROGRESS NOTES
Immunotherapy Note: Allergy Shot      Subjective:       Patient ID: Riddhi Thapa is a 49 y.o. female presents for weekly immunotherapy  Tolerated last injection  No New Medications  Beta Blockers: not on beta blocker    Reaction with last injection?: No  If female, are you pregnant?: No  Are you on any beta blockers?: No  Has the vial ?: No    Are you ill today?: No  Asthma exacerbation or symptoms: No  Do you have a fever today?: No    Peak Flow: 420    Expiration Date #1: 20  Vial Concentration: 1:1 v/v (RED)  Dose (mL) #1: 0.5 mL  Location: Right upper arm  Given By: rw    Expiration Date #2: 20  Vial Concentration: 1:1 v/v (RED)  Dose (mL) #2: 0.5 mL  Location: Left upper arm  Given By : rw                           Objective:     Riddhi Thapa observed for 30 minutes and discharged in good condition        Discussion:     Pt understands the current recommendation. All questions answered to the best of my ability. Continue current management plan.      Electronically signed by Christen Calero    Allergy/Imunology  [facility]  [place] [address]  [place] [address]   Office [phone]

## 2019-10-15 ENCOUNTER — CLINICAL SUPPORT (OUTPATIENT)
Dept: ALLERGY | Facility: CLINIC | Age: 49
End: 2019-10-15
Payer: COMMERCIAL

## 2019-10-15 VITALS — HEART RATE: 93 BPM | OXYGEN SATURATION: 96 % | HEIGHT: 64 IN | BODY MASS INDEX: 28.51 KG/M2 | WEIGHT: 167 LBS

## 2019-10-15 DIAGNOSIS — J30.81 ALLERGIC RHINITIS DUE TO ANIMAL HAIR AND DANDER: ICD-10-CM

## 2019-10-15 PROCEDURE — 95117 IMMUNOTHERAPY INJECTIONS: CPT | Mod: S$GLB,,, | Performed by: ALLERGY & IMMUNOLOGY

## 2019-10-15 PROCEDURE — 95117 PR IMMU2THERAPY, 2+ INJECTIONS: ICD-10-PCS | Mod: S$GLB,,, | Performed by: ALLERGY & IMMUNOLOGY

## 2019-10-15 NOTE — PROGRESS NOTES
Immunotherapy Note: Allergy Shot      Subjective:       Patient ID: Riddhi Thapa is a 49 y.o. female presents for weekly immunotherapy  Tolerated last injection  No New Medications  Beta Blockers: not on beta blocker    Reaction with last injection?: No  If female, are you pregnant?: No  Are you on any beta blockers?: No  Has the vial ?: No    Are you ill today?: No  Asthma exacerbation or symptoms: No  Do you have a fever today?: No    Peak Flow: 450    Expiration Date #1: 20  Vial Concentration: 1:1 v/v (RED)  Dose (mL) #1: 0.5 mL  Location: Right upper arm  Given By: EL    Expiration Date #2: 20  Vial Concentration: 1:1 v/v (RED)  Dose (mL) #2: 0.5 mL  Location: Left upper arm  Given By : EL                           Objective:     Riddhi Thapa observed for 30 minutes and discharged in good condition        Discussion:     Pt understands the current recommendation. All questions answered to the best of my ability. Continue current management plan.      Electronically signed by Samara Jones    Allergy/Imunology  [facility]  [place] [address] [address]  [place] [address]   Office [phone]

## 2020-03-03 LAB
CHOLEST SERPL-MCNC: 201 MG/DL
CHOLEST/HDLC SERPL: 2.4 (CALC)
HDLC SERPL-MCNC: 83 MG/DL
LDLC SERPL CALC-MCNC: 102 MG/DL (CALC)
NONHDLC SERPL-MCNC: 118 MG/DL (CALC)
TRIGL SERPL-MCNC: 74 MG/DL
TSH SERPL-ACNC: 1.14 MIU/L

## 2020-03-11 ENCOUNTER — OFFICE VISIT (OUTPATIENT)
Dept: FAMILY MEDICINE | Facility: CLINIC | Age: 50
End: 2020-03-11
Payer: COMMERCIAL

## 2020-03-11 VITALS
OXYGEN SATURATION: 99 % | TEMPERATURE: 99 F | WEIGHT: 164 LBS | HEART RATE: 69 BPM | HEIGHT: 64 IN | DIASTOLIC BLOOD PRESSURE: 70 MMHG | BODY MASS INDEX: 28 KG/M2 | SYSTOLIC BLOOD PRESSURE: 110 MMHG

## 2020-03-11 DIAGNOSIS — E55.9 VITAMIN D INSUFFICIENCY: ICD-10-CM

## 2020-03-11 DIAGNOSIS — K21.9 GASTROESOPHAGEAL REFLUX DISEASE, ESOPHAGITIS PRESENCE NOT SPECIFIED: ICD-10-CM

## 2020-03-11 DIAGNOSIS — E06.3 HASHIMOTO'S THYROIDITIS: Primary | ICD-10-CM

## 2020-03-11 DIAGNOSIS — F41.8 MIXED ANXIETY DEPRESSIVE DISORDER: ICD-10-CM

## 2020-03-11 DIAGNOSIS — E66.3 OVERWEIGHT (BMI 25.0-29.9): ICD-10-CM

## 2020-03-11 DIAGNOSIS — E78.2 MIXED HYPERLIPIDEMIA: ICD-10-CM

## 2020-03-11 PROCEDURE — 99214 OFFICE O/P EST MOD 30 MIN: CPT | Mod: S$GLB,,, | Performed by: NURSE PRACTITIONER

## 2020-03-11 PROCEDURE — 3008F PR BODY MASS INDEX (BMI) DOCUMENTED: ICD-10-PCS | Mod: S$GLB,,, | Performed by: NURSE PRACTITIONER

## 2020-03-11 PROCEDURE — 3008F BODY MASS INDEX DOCD: CPT | Mod: S$GLB,,, | Performed by: NURSE PRACTITIONER

## 2020-03-11 PROCEDURE — 99214 PR OFFICE/OUTPT VISIT, EST, LEVL IV, 30-39 MIN: ICD-10-PCS | Mod: S$GLB,,, | Performed by: NURSE PRACTITIONER

## 2020-03-11 RX ORDER — LEVOTHYROXINE SODIUM 50 UG/1
50 TABLET ORAL
Qty: 90 TABLET | Refills: 1 | Status: SHIPPED | OUTPATIENT
Start: 2020-03-11 | End: 2020-09-14

## 2020-03-11 NOTE — PATIENT INSTRUCTIONS
Medicines for Acid Reflux  Your healthcare provider has told you that you have acid reflux. This condition causes stomach acid to wash up into your throat. For most people, acid reflux is troubling but not dangerous. But left untreated, acid reflux sometimes damages the esophagus. Medicines can help control acid reflux and limit your risk of future problems.  Medicines for acid reflux  Your healthcare provider may prescribe medicine to help treat your acid reflux. Medicine will be based on your symptoms and any test results. Your provider will explain how to take your medicine. You will also be told about possible side effects.  Reducing stomach acid  Your provider may suggest antacids that you can buy over the counter. Antacids can give fast relief. Or you may be told to take a type of medicine called H2 blockers. These are available over the counter and by prescription (for higher doses).  Blocking stomach acid  In more severe cases, your healthcare provider may suggest stronger medicines such as proton pump inhibitors (PPIs). These keep the stomach from making acid. They are often prescribed for long-term use.  Other medicines  In some cases medicines to reduce or block stomach acid may not work. Then you may be switched to another type of medicine that helps your stomach empty better.     Date Last Reviewed: 10/1/2016  © 0030-3791 [employer]. [address] [place] [address], [place] [address]. All rights reserved. This information is not intended as a substitute for professional medical care. Always follow your healthcare professional's instructions.        Anxiety Reaction  Anxiety is the feeling we all get when we think something bad might happen. It is a normal response to stress and usually causes only a mild reaction. When anxiety becomes more severe, it can interfere with daily life. In some cases, you may not even be aware of what it is youre anxious about. There may also be a genetic link or it  may be a learned behavior in the home.  Both psychological and physical triggers cause stress reaction. It's often a response to fear or emotional stress, real or imagined. This stress may come from home, family, work, or social relationships.  During an anxiety reaction, you may feel:  · Helpless  · Nervous  · Depressed  · Irritable  Your body may show signs of anxiety in many ways. You may experience:  · Dry mouth  · Shakiness  · Dizziness  · Weakness  · Trouble breathing  · Breathing fast (hyperventilating)  · Chest pressure  · Sweating  · Headache  · Nausea  · Diarrhea  · Tiredness  · Inability to sleep  · Sexual problems  Home care  · Try to locate the sources of stress in your life. They may not be obvious. These may include:  ¨ Daily hassles of life (traffic jams, missed appointments, car troubles, etc.)  ¨ Major life changes, both good (new baby, job promotion) and bad (loss of job, loss of loved one)  ¨ Overload: feeling that you have too many responsibilities and can't take care of all of them at once  ¨ Feeling helpless, feeling that your problems are beyond what youre able to solve  · Notice how your body reacts to stress. Learn to listen to your body signals. This will help you take action before the stress becomes severe.  · When you can, do something about the source of your stress. (Avoid hassles, limit the amount of change that happens in your life at one time and take a break when you feel overloaded).  · Unfortunately, many stressful situations can't be avoided. It is necessary to learn how to better manage stress. There are many proven methods that will reduce your anxiety. These include simple things like exercise, good nutrition and adequate rest. Also, there are certain techniques that are helpful:  ¨ Relaxation  ¨ Breathing exercises  ¨ Visualization  ¨ Biofeedback  ¨ Meditation  For more information about this, consult your doctor or go to a local bookstore and review the many books and  tapes available on this subject.  Follow-up care  If you feel that your anxiety is not responding to self-help measures, contact your doctor or make an appointment with a counselor. You may need short-term psychological counseling and temporary medicine to help you manage stress.  Call 911  Call your healthcare provider right away if any of these occur:  · Trouble breathing  · Confusion  · Drowsiness or trouble wakening  · Fainting or loss of consciousness  · Rapid heart rate  · Seizure  · New chest pain that becomes more severe, lasts longer, or spreads into your shoulder, arm, neck, jaw, or back  When to seek medical advice  Call your healthcare provider right away if any of these occur:  · Your symptoms get worse  · Severe headache not relieved by rest and mild pain reliever  Date Last Reviewed: 9/29/2015  © 6999-0077 [employer]. [address] [place] [address], [place] [address]. All rights reserved. This information is not intended as a substitute for professional medical care. Always follow your healthcare professional's instructions.

## 2020-03-11 NOTE — PROGRESS NOTES
Subjective:       Patient ID: Riddhi Thapa is a 49 y.o. female.    Chief Complaint: Follow-up (lab review); Thyroid Problem; Hyperlipidemia; and Blood Sugar Problem    Patient presents today following up for her 6 month checkup on chronic conditions including anxiety, depressed mood, hyperlipidemia, and hypothyroidism.  Patient is tolerating current medication regimen well with no side effects.  Patient is overweight with a BMI of 28.15    Anxiety   Presents for follow-up visit. Symptoms include decreased concentration, depressed mood, excessive worry, irritability and nervous/anxious behavior (controlled). Patient reports no confusion, palpitations, shortness of breath or suicidal ideas. Symptoms occur occasionally. The severity of symptoms is mild. The quality of sleep is fair. Nighttime awakenings: occasional.     Compliance with medications is %.   Hyperlipidemia   This is a recurrent problem. The current episode started more than 1 month ago. The problem is uncontrolled. Recent lipid tests were reviewed and are variable. Exacerbating diseases include hypothyroidism and obesity. She has no history of chronic renal disease, diabetes, liver disease or nephrotic syndrome. Factors aggravating her hyperlipidemia include fatty foods. Pertinent negatives include no focal weakness, leg pain or shortness of breath. Current antihyperlipidemic treatment includes diet change. The current treatment provides mild improvement of lipids. Compliance problems include adherence to diet and adherence to exercise.  Risk factors for coronary artery disease include stress, dyslipidemia and obesity.   Thyroid Problem   Presents for follow-up visit. Symptoms include anxiety (controlled) and depressed mood. Patient reports no cold intolerance, dry skin, hair loss, heat intolerance, hoarse voice, nail problem, palpitations or tremors. The symptoms have been stable. Her past medical history is significant for hyperlipidemia.  There is no history of diabetes.   Depression   Visit Type: follow-up  Patient presents with the following symptoms: decreased concentration, depressed mood, excessive worry, fatigue, irritability and nervousness/anxiety (controlled).  Patient is not experiencing: confusion, palpitations, shortness of breath, suicidal ideas, suicidal planning and thoughts of death.  Frequency of symptoms: rarely   Severity: mild   Sleep quality: fair  Nighttime awakenings: occasional  Compliance with medications:  %          Review of Systems   Constitutional: Positive for irritability. Negative for activity change and appetite change.        Obesity   HENT: Negative for ear discharge, ear pain, hoarse voice, trouble swallowing and voice change.    Eyes: Negative for photophobia, pain, discharge and visual disturbance.   Respiratory: Negative for chest tightness and shortness of breath.    Cardiovascular: Negative for palpitations.        Hyperlipidemia   Gastrointestinal:        GERD   Endocrine: Negative for cold intolerance and heat intolerance.        Hypothyroid   Genitourinary: Negative for difficulty urinating and dysuria.   Musculoskeletal: Negative for arthralgias and gait problem.   Allergic/Immunologic: Negative for immunocompromised state.   Neurological: Negative for tremors, focal weakness and speech difficulty.   Psychiatric/Behavioral: Positive for decreased concentration, depression and dysphoric mood. Negative for confusion, self-injury and suicidal ideas. The patient is nervous/anxious (controlled).        Past Medical History:   Diagnosis Date    Anxiety     Asthma     Depression     GERD (gastroesophageal reflux disease)     Hashimoto's thyroiditis     Hyperlipidemia     Hypothyroidism     Kidney stones       Past Surgical History:   Procedure Laterality Date    EXTRACORPOREAL SHOCK WAVE LITHOTRIPSY  2011    HYSTERECTOMY      LITHOTRIPSY      SINUS SURGERY  2015    TUBAL LIGATION      Bi-Lat        Family History   Problem Relation Age of Onset    Eczema Mother     Thyroid disease Mother     Cancer Father         Renal    Eczema Father     COPD Maternal Grandmother     Diabetes Maternal Grandmother     Stroke Maternal Grandmother     Thyroid disease Maternal Grandmother     Asthma Neg Hx     Immunodeficiency Neg Hx     Rhinitis Neg Hx        Social History     Socioeconomic History    Marital status:      Spouse name: Not on file    Number of children: Not on file    Years of education: Not on file    Highest education level: Not on file   Occupational History    Occupation: Teacher     Comment: [employer]   Social Needs    Financial resource strain: Not on file    Food insecurity:     Worry: Not on file     Inability: Not on file    Transportation needs:     Medical: Not on file     Non-medical: Not on file   Tobacco Use    Smoking status: Never Smoker    Smokeless tobacco: Never Used   Substance and Sexual Activity    Alcohol use: Yes     Comment: weekends only    Drug use: No    Sexual activity: Yes     Partners: Male   Lifestyle    Physical activity:     Days per week: Not on file     Minutes per session: Not on file    Stress: Not at all   Relationships    Social connections:     Talks on phone: Not on file     Gets together: Not on file     Attends Congregational service: Not on file     Active member of club or organization: Not on file     Attends meetings of clubs or organizations: Not on file     Relationship status: Not on file   Other Topics Concern    Not on file   Social History Narrative    Not on file       Current Outpatient Medications   Medication Sig Dispense Refill    albuterol (PROAIR HFA) 90 mcg/actuation inhaler Inhale 2 puffs into the lungs every 6 (six) hours as needed for Wheezing. 1 Inhaler 2    azelastine-fluticasone (DYMISTA) 137-50 mcg/spray Spry nassal spray 1 spray by Each Nostril route 2 (two) times daily. 1 Bottle 1     "budesonide-formoterol 160-4.5 mcg (SYMBICORT) 160-4.5 mcg/actuation HFAA Inhale 2 puffs into the lungs every 12 (twelve) hours. Controller 1 Inhaler 3    cetirizine (ZYRTEC) 10 mg Cap Take 1 capsule by mouth daily as needed.      cholecalciferol, vitamin D3, (VITAMIN D3) 1,000 unit capsule Take 1 capsule by mouth Daily.      dexlansoprazole (DEXILANT) 60 mg capsule Take 60 mg by mouth once daily.        dextroamphetamine-amphetamine (ADDERALL XR) 30 MG 24 hr capsule Take 30 mg by mouth every morning.      montelukast (SINGULAIR) 10 mg tablet Take 1 tablet (10 mg total) by mouth once daily. 30 tablet 1    SYNTHROID 50 mcg tablet Take 1 tablet (50 mcg total) by mouth before breakfast. 90 tablet 1    valacyclovir (VALTREX) 500 MG tablet Take 1 tablet by mouth as needed.       Current Facility-Administered Medications   Medication Dose Route Frequency Provider Last Rate Last Dose    Allergy Mix   Subcutaneous Q30 Days Samara Jones MD           Review of patient's allergies indicates:   Allergen Reactions    Nsaids (non-steroidal anti-inflammatory drug)      causes upset stomach, has GERD     Objective:      Blood pressure 110/70, pulse 69, temperature 98.7 °F (37.1 °C), height 5' 4" (1.626 m), weight 74.4 kg (164 lb), last menstrual period 09/16/2012, SpO2 99 %. Body mass index is 28.15 kg/m².   Physical Exam   Constitutional: She is oriented to person, place, and time. She appears well-developed. She is cooperative. No distress.   Obesity   HENT:   Head: Normocephalic and atraumatic.   Right Ear: Tympanic membrane and external ear normal.   Left Ear: Tympanic membrane and external ear normal.   Nose: Nose normal.   Mouth/Throat: Oropharynx is clear and moist.   Eyes: Pupils are equal, round, and reactive to light. Conjunctivae, EOM and lids are normal. Lids are everted and swept, no foreign bodies found. Right pupil is round and reactive. Left pupil is round and reactive.   Neck: Trachea normal and normal " range of motion. Neck supple. No thyromegaly present.   Cardiovascular: Normal rate, regular rhythm, S1 normal, S2 normal, normal heart sounds and intact distal pulses.   No murmur heard.  Pulmonary/Chest: Effort normal and breath sounds normal. No respiratory distress. She has no wheezes.   Abdominal: Soft. Bowel sounds are normal. There is no rigidity and no guarding.   Musculoskeletal: Normal range of motion.   Lymphadenopathy:     She has no cervical adenopathy.     She has no axillary adenopathy.   Neurological: She is alert and oriented to person, place, and time.   Skin: Skin is warm and dry. Capillary refill takes less than 2 seconds.   Psychiatric: Her speech is normal and behavior is normal. Judgment and thought content normal. Her mood appears anxious (controlled). Cognition and memory are normal. She exhibits a depressed mood (controlled).   Attention deficit controlled   Nursing note and vitals reviewed.          Assessment:       1. Hashimoto's thyroiditis    2. Gastroesophageal reflux disease, esophagitis presence not specified    3. Mixed hyperlipidemia    4. Mixed anxiety depressive disorder    5. Vitamin D insufficiency    6. Overweight (BMI 25.0-29.9)        Plan:       Riddhi was seen today for follow-up, thyroid problem, hyperlipidemia and blood sugar problem.    Diagnoses and all orders for this visit:    Hashimoto's thyroiditis  -     SYNTHROID 50 mcg tablet; Take 1 tablet (50 mcg total) by mouth before breakfast.  -     TSH; Future  -     TSH    Gastroesophageal reflux disease, esophagitis presence not specified  -     Basic metabolic panel; Future  -     Basic metabolic panel  Controlled. Continue current treatment plan.    Mixed hyperlipidemia  -     Lipid panel; Future  -     Lipid panel  Limit red meat, butter, fried foods, cheese, and other foods that have a lot of saturated fat. Consume more: lean meats, fish, fruits, vegetables, whole grains, beans, lentils, and nuts.  Weight loss,  and 30-45 min of cardiovascular exercise daily.     Mixed anxiety depressive disorder  -     Basic metabolic panel; Future  -     Basic metabolic panel  Controlled.    Vitamin D insufficiency  -     Vitamin D; Future  -     Vitamin D  Continue current supplement.    Overweight (BMI 25.0-29.9)  The patient is asked to make an attempt to improve diet and exercise patterns to aid in medical management of this problem.      Follow up in 6 months for thyroid and cholesterol recheck.

## 2020-03-17 DIAGNOSIS — E06.3 HASHIMOTO'S THYROIDITIS: ICD-10-CM

## 2020-03-17 DIAGNOSIS — J45.41 MODERATE PERSISTENT ASTHMA WITH ACUTE EXACERBATION: ICD-10-CM

## 2020-03-17 RX ORDER — LEVOTHYROXINE SODIUM 50 UG/1
TABLET ORAL
Qty: 30 TABLET | Refills: 0 | OUTPATIENT
Start: 2020-03-17

## 2020-03-17 RX ORDER — MONTELUKAST SODIUM 10 MG/1
TABLET ORAL
Qty: 30 TABLET | Refills: 0 | Status: SHIPPED | OUTPATIENT
Start: 2020-03-17 | End: 2020-10-07

## 2020-04-15 ENCOUNTER — PATIENT MESSAGE (OUTPATIENT)
Dept: FAMILY MEDICINE | Facility: CLINIC | Age: 50
End: 2020-04-15

## 2020-04-20 ENCOUNTER — OFFICE VISIT (OUTPATIENT)
Dept: FAMILY MEDICINE | Facility: CLINIC | Age: 50
End: 2020-04-20
Payer: COMMERCIAL

## 2020-04-20 VITALS
WEIGHT: 169 LBS | BODY MASS INDEX: 28.85 KG/M2 | SYSTOLIC BLOOD PRESSURE: 120 MMHG | OXYGEN SATURATION: 98 % | TEMPERATURE: 99 F | HEIGHT: 64 IN | HEART RATE: 77 BPM | DIASTOLIC BLOOD PRESSURE: 78 MMHG

## 2020-04-20 DIAGNOSIS — M54.32 SCIATIC PAIN, LEFT: ICD-10-CM

## 2020-04-20 DIAGNOSIS — G89.29 CHRONIC SI JOINT PAIN: ICD-10-CM

## 2020-04-20 DIAGNOSIS — E66.3 OVERWEIGHT (BMI 25.0-29.9): ICD-10-CM

## 2020-04-20 DIAGNOSIS — M53.3 CHRONIC SI JOINT PAIN: ICD-10-CM

## 2020-04-20 DIAGNOSIS — Z87.39 HISTORY OF BURSITIS: ICD-10-CM

## 2020-04-20 DIAGNOSIS — M62.838 MUSCLE SPASM: Primary | ICD-10-CM

## 2020-04-20 PROCEDURE — 3008F BODY MASS INDEX DOCD: CPT | Mod: S$GLB,,, | Performed by: NURSE PRACTITIONER

## 2020-04-20 PROCEDURE — 96372 PR INJECTION,THERAP/PROPH/DIAG2ST, IM OR SUBCUT: ICD-10-PCS | Mod: S$GLB,,, | Performed by: NURSE PRACTITIONER

## 2020-04-20 PROCEDURE — 99214 PR OFFICE/OUTPT VISIT, EST, LEVL IV, 30-39 MIN: ICD-10-PCS | Mod: 25,S$GLB,, | Performed by: NURSE PRACTITIONER

## 2020-04-20 PROCEDURE — 3008F PR BODY MASS INDEX (BMI) DOCUMENTED: ICD-10-PCS | Mod: S$GLB,,, | Performed by: NURSE PRACTITIONER

## 2020-04-20 PROCEDURE — 96372 THER/PROPH/DIAG INJ SC/IM: CPT | Mod: S$GLB,,, | Performed by: NURSE PRACTITIONER

## 2020-04-20 PROCEDURE — 99214 OFFICE O/P EST MOD 30 MIN: CPT | Mod: 25,S$GLB,, | Performed by: NURSE PRACTITIONER

## 2020-04-20 RX ORDER — CYCLOBENZAPRINE HCL 5 MG
5 TABLET ORAL 3 TIMES DAILY PRN
Qty: 30 TABLET | Refills: 0 | Status: SHIPPED | OUTPATIENT
Start: 2020-04-20 | End: 2020-04-30

## 2020-04-20 RX ORDER — METHYLPREDNISOLONE 4 MG/1
TABLET ORAL
Qty: 1 PACKAGE | Refills: 0 | Status: SHIPPED | OUTPATIENT
Start: 2020-04-20 | End: 2020-05-11

## 2020-04-20 RX ORDER — MELOXICAM 15 MG/1
15 TABLET ORAL DAILY
Qty: 30 TABLET | Refills: 2 | Status: SHIPPED | OUTPATIENT
Start: 2020-04-20 | End: 2020-10-07

## 2020-04-20 RX ORDER — DEXAMETHASONE SODIUM PHOSPHATE 4 MG/ML
8 INJECTION, SOLUTION INTRA-ARTICULAR; INTRALESIONAL; INTRAMUSCULAR; INTRAVENOUS; SOFT TISSUE
Status: COMPLETED | OUTPATIENT
Start: 2020-04-20 | End: 2020-04-20

## 2020-04-20 RX ORDER — KETOROLAC TROMETHAMINE 30 MG/ML
30 INJECTION, SOLUTION INTRAMUSCULAR; INTRAVENOUS
Status: COMPLETED | OUTPATIENT
Start: 2020-04-20 | End: 2020-04-20

## 2020-04-20 RX ADMIN — KETOROLAC TROMETHAMINE 30 MG: 30 INJECTION, SOLUTION INTRAMUSCULAR; INTRAVENOUS at 10:04

## 2020-04-20 RX ADMIN — DEXAMETHASONE SODIUM PHOSPHATE 8 MG: 4 INJECTION, SOLUTION INTRA-ARTICULAR; INTRALESIONAL; INTRAMUSCULAR; INTRAVENOUS; SOFT TISSUE at 10:04

## 2020-04-20 NOTE — PROGRESS NOTES
Subjective:       Patient ID: Riddhi Thapa is a 49 y.o. female.    Chief Complaint: Back Pain    Back Pain   This is a recurrent problem. The current episode started 1 to 4 weeks ago. The problem occurs constantly. The problem has been gradually worsening since onset. The pain is present in the gluteal and sacro-iliac. The quality of the pain is described as shooting and stabbing. The pain radiates to the left thigh. The pain is at a severity of 9/10. The pain is severe. The pain is worse during the day. The symptoms are aggravated by sitting. Associated symptoms include leg pain and weakness. Pertinent negatives include no abdominal pain, bladder incontinence, bowel incontinence, chest pain, dysuria, fever, headaches, numbness, paresis, paresthesias, pelvic pain, perianal numbness, tingling or weight loss. Risk factors include obesity and recent trauma (High impact exercise). She has tried heat, home exercises, walking and analgesics for the symptoms. The treatment provided mild relief.   Leg Pain    The incident occurred more than 1 week ago. The incident occurred at home. The injury mechanism was a direct blow. The pain is present in the left leg. The pain is at a severity of 5/10. The pain is moderate. The pain has been fluctuating since onset. Associated symptoms include muscle weakness. Pertinent negatives include no inability to bear weight, loss of motion, loss of sensation, numbness or tingling. She reports no foreign bodies present. The symptoms are aggravated by weight bearing. She has tried non-weight bearing and rest for the symptoms. The treatment provided mild relief.   Spasms   This is a new problem. The current episode started 1 to 4 weeks ago. The problem occurs daily. The problem has been waxing and waning. Associated symptoms include arthralgias, myalgias and weakness. Pertinent negatives include no abdominal pain, chest pain, congestion, coughing, fever, headaches, joint swelling, nausea,  numbness, rash, sore throat, visual change or vomiting. The symptoms are aggravated by standing. She has tried rest, position changes and NSAIDs for the symptoms. The treatment provided mild relief.     Review of Systems   Constitutional: Negative for activity change, appetite change, fever and weight loss.        Overweight   HENT: Negative for congestion, ear discharge, ear pain, sore throat, trouble swallowing and voice change.    Eyes: Negative for photophobia, pain, discharge and visual disturbance.   Respiratory: Negative for cough, chest tightness and shortness of breath.    Cardiovascular: Negative for chest pain and palpitations.   Gastrointestinal: Negative for abdominal pain, bowel incontinence, nausea and vomiting.   Endocrine: Negative for cold intolerance and heat intolerance.   Genitourinary: Negative for bladder incontinence, difficulty urinating, dysuria and pelvic pain.   Musculoskeletal: Positive for arthralgias, back pain and myalgias. Negative for gait problem and joint swelling.   Skin: Negative for rash.   Allergic/Immunologic: Negative for immunocompromised state.   Neurological: Positive for weakness. Negative for tingling, speech difficulty, numbness, headaches and paresthesias.   Psychiatric/Behavioral: Negative for confusion, self-injury and suicidal ideas.       Past Medical History:   Diagnosis Date    Anxiety     Asthma     Depression     GERD (gastroesophageal reflux disease)     Hashimoto's thyroiditis     Hyperlipidemia     Hypothyroidism     Kidney stones       Past Surgical History:   Procedure Laterality Date    EXTRACORPOREAL SHOCK WAVE LITHOTRIPSY  2011    HYSTERECTOMY      LITHOTRIPSY      SINUS SURGERY  2015    TUBAL LIGATION      Bi-Lat       Family History   Problem Relation Age of Onset    Eczema Mother     Thyroid disease Mother     Cancer Father         Renal    Eczema Father     COPD Maternal Grandmother     Diabetes Maternal Grandmother     Stroke  Maternal Grandmother     Thyroid disease Maternal Grandmother     Asthma Neg Hx     Immunodeficiency Neg Hx     Rhinitis Neg Hx        Social History     Socioeconomic History    Marital status:      Spouse name: Not on file    Number of children: Not on file    Years of education: Not on file    Highest education level: Not on file   Occupational History    Occupation: Teacher     Comment: [employer]   Social Needs    Financial resource strain: Not on file    Food insecurity:     Worry: Not on file     Inability: Not on file    Transportation needs:     Medical: Not on file     Non-medical: Not on file   Tobacco Use    Smoking status: Never Smoker    Smokeless tobacco: Never Used   Substance and Sexual Activity    Alcohol use: Yes     Comment: weekends only    Drug use: No    Sexual activity: Yes     Partners: Male   Lifestyle    Physical activity:     Days per week: Not on file     Minutes per session: Not on file    Stress: Not at all   Relationships    Social connections:     Talks on phone: Not on file     Gets together: Not on file     Attends Buddhism service: Not on file     Active member of club or organization: Not on file     Attends meetings of clubs or organizations: Not on file     Relationship status: Not on file   Other Topics Concern    Not on file   Social History Narrative    Not on file       Current Outpatient Medications   Medication Sig Dispense Refill    albuterol (PROAIR HFA) 90 mcg/actuation inhaler Inhale 2 puffs into the lungs every 6 (six) hours as needed for Wheezing. 1 Inhaler 2    azelastine-fluticasone (DYMISTA) 137-50 mcg/spray Spry nassal spray 1 spray by Each Nostril route 2 (two) times daily. 1 Bottle 1    budesonide-formoterol 160-4.5 mcg (SYMBICORT) 160-4.5 mcg/actuation HFAA Inhale 2 puffs into the lungs every 12 (twelve) hours. Controller 1 Inhaler 3    cetirizine (ZYRTEC) 10 mg Cap Take 1 capsule by mouth daily as needed.       "cholecalciferol, vitamin D3, (VITAMIN D3) 1,000 unit capsule Take 1 capsule by mouth Daily.      dexlansoprazole (DEXILANT) 60 mg capsule Take 60 mg by mouth once daily.        dextroamphetamine-amphetamine (ADDERALL XR) 30 MG 24 hr capsule Take 30 mg by mouth every morning.      montelukast (SINGULAIR) 10 mg tablet TAKE ONE TABLET BY MOUTH ONCE DAILY 30 tablet 0    SYNTHROID 50 mcg tablet Take 1 tablet (50 mcg total) by mouth before breakfast. 90 tablet 1    valacyclovir (VALTREX) 500 MG tablet Take 1 tablet by mouth as needed.      cyclobenzaprine (FLEXERIL) 5 MG tablet Take 1 tablet (5 mg total) by mouth 3 (three) times daily as needed for Muscle spasms. 30 tablet 0    meloxicam (MOBIC) 15 MG tablet Take 1 tablet (15 mg total) by mouth once daily. 30 tablet 2    methylPREDNISolone (MEDROL DOSEPACK) 4 mg tablet use as directed 1 Package 0     Current Facility-Administered Medications   Medication Dose Route Frequency Provider Last Rate Last Dose    Allergy Mix   Subcutaneous Q30 Days Samara Jones MD           Review of patient's allergies indicates:   Allergen Reactions    Nsaids (non-steroidal anti-inflammatory drug)      causes upset stomach, has GERD     Objective:      Blood pressure 120/78, pulse 77, temperature 98.9 °F (37.2 °C), height 5' 4" (1.626 m), weight 76.7 kg (169 lb), last menstrual period 09/16/2012, SpO2 98 %. Body mass index is 29.01 kg/m².   Physical Exam   Constitutional: She is oriented to person, place, and time. She appears well-developed. She is cooperative. No distress.   overweight   HENT:   Head: Normocephalic and atraumatic.   Right Ear: Tympanic membrane normal.   Left Ear: Tympanic membrane normal.   Nose: Nose normal.   Mouth/Throat: Oropharynx is clear and moist.   Eyes: Pupils are equal, round, and reactive to light. Conjunctivae, EOM and lids are normal. Lids are everted and swept, no foreign bodies found. Right pupil is round and reactive. Left pupil is round and " reactive.   Neck: Trachea normal and normal range of motion. Neck supple.   Cardiovascular: Normal rate, regular rhythm, S1 normal, S2 normal, normal heart sounds and intact distal pulses.   Pulmonary/Chest: Effort normal and breath sounds normal. No respiratory distress. She has no wheezes.   Abdominal: Soft. Bowel sounds are normal. There is no rigidity and no guarding.   Musculoskeletal: Normal range of motion.        Left upper leg: She exhibits tenderness. She exhibits no swelling.        Legs:  Lymphadenopathy:     She has no cervical adenopathy.     She has no axillary adenopathy.   Neurological: She is alert and oriented to person, place, and time.   Skin: Skin is warm and dry. Capillary refill takes less than 2 seconds.   Psychiatric: She has a normal mood and affect. Her behavior is normal. Judgment and thought content normal.   Nursing note and vitals reviewed.          Assessment:       1. Muscle spasm    2. Sciatic pain, left    3. Chronic SI joint pain    4. History of bursitis    5. Overweight (BMI 25.0-29.9)        Plan:       Riddhi was seen today for back pain.    Diagnoses and all orders for this visit:    Muscle spasm  -     cyclobenzaprine (FLEXERIL) 5 MG tablet; Take 1 tablet (5 mg total) by mouth 3 (three) times daily as needed for Muscle spasms.    Sciatic pain, left  -     Ambulatory referral/consult to Orthopedics; Future  -     methylPREDNISolone (MEDROL DOSEPACK) 4 mg tablet; use as directed  -     meloxicam (MOBIC) 15 MG tablet; Take 1 tablet (15 mg total) by mouth once daily.  -     ketorolac injection 30 mg  -     dexamethasone injection 8 mg    Chronic SI joint pain  -     Ambulatory referral/consult to Orthopedics; Future  -     methylPREDNISolone (MEDROL DOSEPACK) 4 mg tablet; use as directed  -     meloxicam (MOBIC) 15 MG tablet; Take 1 tablet (15 mg total) by mouth once daily.  -     ketorolac injection 30 mg  -     dexamethasone injection 8 mg    History of bursitis  -      Ambulatory referral/consult to Orthopedics; Future    Overweight (BMI 25.0-29.9)  The patient is asked to make an attempt to improve diet and exercise patterns to aid in medical management of this problem.

## 2020-04-21 NOTE — PATIENT INSTRUCTIONS
Back Spasm (No Trauma)    Spasm of the back muscles can occur after a sudden forceful twisting or bending force (such as in a car accident), after a simple awkward movement, or after lifting something heavy with poor body positioning. In any case, muscle spasm adds to the pain. Sleeping in an awkward position or on a poor quality mattress can also cause this. Some people respond to emotional stress by tensing the muscles of their back.  Pain that continues may need further evaluation or other types of treatment such as physical therapy.  You don't always need X-rays for the initial evaluation of back pain, unless you had a physical injury such as from a car accident or fall. If your pain continues and doesn't respond to medical treatment, X-rays and other tests may then be done.   Home care  · As soon as possible, start sitting or walking again to avoid problems from prolonged bed rest (muscle weakness, worsening back stiffness and pain, blood clots in the legs).  · When in bed, try to find a position of comfort. A firm mattress is best. Try lying flat on your back with pillows under your knees. You can also try lying on your side with your knees bent up toward your chest and a pillow between your knees.  · Avoid prolonged sitting, long car rides, or travel. This puts more stress on the lower back than standing or walking.   · During the first 24 to 72 hours after an injury or flare-up, apply an ice pack to the painful area for 20 minutes, then remove it for 20 minutes. Do this over a period of 60 to 90 minutes or several times a day. This will reduce swelling and pain. Always wrap ice packs in a thin towel.  · You can start with ice, then switch to heat. Heat (hot shower, hot bath, or heating pad) reduces pain, and works well for muscle spasms. Apply heat to the painful area for 20 minutes, then remove it for 20 minutes. Do this over a period of 60 to 90 minutes or several times a day. Do not sleep on a heating  pad as it can burn or damage skin.  · Alternate ice and heat therapies.  · Be aware of safe lifting methods and do not lift anything over 15 pounds until all the pain is gone.  Gentle stretching will help your back heal faster. Do this simple routine 2 to 3 times a day until your back is feeling better.  · Lie on your back with your knees bent and both feet on the ground  · Slowly raise your left knee to your chest as you flatten your lower back against the floor. Hold for 20 to 30 seconds.  · Relax and repeat the exercise with your right knee.  · Do 2 to 3 of these exercises for each leg.  · Repeat, hugging both knees to your chest at the same time.  · Do not bounce, but use a gentle pull.  Medicines  Talk to your doctor before using medicine, especially if you have other medical problems or are taking other medicines.  You may use acetaminophen or ibuprofen to control pain, unless your healthcare provider prescribed another pain medicine. If you have a chronic condition such as diabetes, liver or kidney disease, stomach ulcer, or gastrointestinal bleeding, or are taking blood thinners, talk with your healthcare provider before taking any medicines.  Be careful if you are given prescription pain medicine, narcotics, or medicine for muscle spasm. They can cause drowsiness, affect your coordination, reflexes, or judgment. Do not drive or operate heavy machinery when taking these medicines. Take pain medicine only as prescribed by your healthcare provider.  Follow-up care  Follow up with your doctor, or as advised. Physical therapy or further tests may be needed.  If X-rays were taken, they may be reviewed by a radiologist. You will be notified of any new findings that may affect your care.  Call 911  Seek emergency medical care if any of these occur:  · Trouble breathing  · Confusion  · Drowsiness or trouble awakening  · Fainting or loss of consciousness  · Rapid or very slow heart rate  · Loss of bowel or bladder  control  When to seek medical advice  Call your healthcare provider right away if any of these occur:  · Pain becomes worse or spreads to your legs  · Weakness or numbness in one or both legs  · Numbness in the groin or genital area  · Unexplained fever over 100.4ºF (38.0ºC)  · Burning or pain when passing urine  Date Last Reviewed: 6/1/2016  © 7089-3437 [employer]. [address] [place] [address]. All rights reserved. This information is not intended as a substitute for professional medical care. Always follow your healthcare professional's instructions.        Hip Flexor Stretch (Flexibility)      1. Kneel on the floor on a mat or carpet. Put your right foot on the floor in front of you, with the knee bent. Hold on to a chair for balance if needed.  2. Press your hips forward, keeping your back and shoulders upright. Feel the stretch in the front of your left hip.  3. Hold for 30 to 60 seconds. Relax.  4. Repeat 2 times. Switch sides.   5. Repeat 3 times per day, or as instructed.  Date Last Reviewed: 3/10/2016  © 0102-4105 [employer]. [address] [place] [address]. All rights reserved. This information is not intended as a substitute for professional medical care. Always follow your healthcare professional's instructions.

## 2020-06-11 ENCOUNTER — HOSPITAL ENCOUNTER (OUTPATIENT)
Dept: RADIOLOGY | Facility: HOSPITAL | Age: 50
Discharge: HOME | End: 2020-06-11
Attending: SPECIALIST
Payer: COMMERCIAL

## 2020-06-11 DIAGNOSIS — Z12.31 ENCOUNTER FOR SCREENING MAMMOGRAM FOR MALIGNANT NEOPLASM OF BREAST: Primary | ICD-10-CM

## 2020-06-11 DIAGNOSIS — Z12.31 ENCOUNTER FOR SCREENING MAMMOGRAM FOR MALIGNANT NEOPLASM OF BREAST: ICD-10-CM

## 2020-06-11 PROCEDURE — 77067 SCR MAMMO BI INCL CAD: CPT | Mod: TC,PO

## 2020-06-23 ENCOUNTER — CLINICAL SUPPORT (OUTPATIENT)
Dept: ALLERGY | Facility: CLINIC | Age: 50
End: 2020-06-23
Payer: COMMERCIAL

## 2020-06-23 DIAGNOSIS — J30.81 ALLERGIC RHINITIS DUE TO ANIMAL HAIR AND DANDER: ICD-10-CM

## 2020-06-23 PROCEDURE — 95165 PR PROFES SVC,IMMUNOTHER,SINGLE/MULT AGS: ICD-10-PCS | Mod: S$GLB,,, | Performed by: ALLERGY & IMMUNOLOGY

## 2020-06-23 PROCEDURE — 95165 ANTIGEN THERAPY SERVICES: CPT | Mod: S$GLB,,, | Performed by: ALLERGY & IMMUNOLOGY

## 2020-06-23 NOTE — PROGRESS NOTES
Using 70% alcohol clean surface was prepped. Sterile drape then covered clean space.  Hands were scrubbed and placed into sterile gloves. Gown, mask, and hair net worn according to  guidelines allergist exemption.     Mixed 6/23/2020     Vial 1 and vial 2 - Both will be restarts equivalent to new start for a total of 80 units.

## 2020-07-01 ENCOUNTER — CLINICAL SUPPORT (OUTPATIENT)
Dept: ALLERGY | Facility: CLINIC | Age: 50
End: 2020-07-01
Payer: COMMERCIAL

## 2020-07-01 ENCOUNTER — OFFICE VISIT (OUTPATIENT)
Dept: ALLERGY | Facility: CLINIC | Age: 50
End: 2020-07-01
Payer: COMMERCIAL

## 2020-07-01 VITALS
HEIGHT: 64 IN | HEART RATE: 82 BPM | HEIGHT: 64 IN | DIASTOLIC BLOOD PRESSURE: 80 MMHG | BODY MASS INDEX: 28.85 KG/M2 | BODY MASS INDEX: 28.85 KG/M2 | DIASTOLIC BLOOD PRESSURE: 80 MMHG | WEIGHT: 169 LBS | SYSTOLIC BLOOD PRESSURE: 128 MMHG | TEMPERATURE: 98 F | WEIGHT: 169 LBS | TEMPERATURE: 98 F | HEART RATE: 82 BPM | SYSTOLIC BLOOD PRESSURE: 128 MMHG

## 2020-07-01 DIAGNOSIS — J45.40 MODERATE PERSISTENT EXTRINSIC ASTHMA WITHOUT COMPLICATION: Primary | ICD-10-CM

## 2020-07-01 DIAGNOSIS — J30.1 SEASONAL ALLERGIC RHINITIS DUE TO POLLEN: ICD-10-CM

## 2020-07-01 DIAGNOSIS — J30.81 ALLERGIC RHINITIS DUE TO CATS: ICD-10-CM

## 2020-07-01 DIAGNOSIS — J30.1 NON-SEASONAL ALLERGIC RHINITIS DUE TO POLLEN: ICD-10-CM

## 2020-07-01 DIAGNOSIS — J30.89 CHRONIC NONSEASONAL ALLERGIC RHINITIS DUE TO POLLEN: ICD-10-CM

## 2020-07-01 PROBLEM — J45.909 EXTRINSIC ASTHMA WITHOUT COMPLICATION: Status: ACTIVE | Noted: 2020-07-01

## 2020-07-01 PROCEDURE — 3008F PR BODY MASS INDEX (BMI) DOCUMENTED: ICD-10-PCS | Mod: S$GLB,,, | Performed by: ALLERGY & IMMUNOLOGY

## 2020-07-01 PROCEDURE — 95180 PR RAPID DESENSITIZATION PROC,EACH HOUR: ICD-10-PCS | Mod: S$GLB,,, | Performed by: ALLERGY & IMMUNOLOGY

## 2020-07-01 PROCEDURE — 95180 RAPID DESENSITIZATION: CPT | Mod: S$GLB,,, | Performed by: ALLERGY & IMMUNOLOGY

## 2020-07-01 PROCEDURE — 99215 OFFICE O/P EST HI 40 MIN: CPT | Mod: 25,S$GLB,, | Performed by: ALLERGY & IMMUNOLOGY

## 2020-07-01 PROCEDURE — 99215 PR OFFICE/OUTPT VISIT, EST, LEVL V, 40-54 MIN: ICD-10-PCS | Mod: 25,S$GLB,, | Performed by: ALLERGY & IMMUNOLOGY

## 2020-07-01 PROCEDURE — 3008F BODY MASS INDEX DOCD: CPT | Mod: S$GLB,,, | Performed by: ALLERGY & IMMUNOLOGY

## 2020-07-01 RX ORDER — AZELASTINE HYDROCHLORIDE, FLUTICASONE PROPIONATE 137; 50 UG/1; UG/1
1 SPRAY, METERED NASAL 2 TIMES DAILY
Qty: 3 BOTTLE | Refills: 1 | Status: SHIPPED | OUTPATIENT
Start: 2020-07-01 | End: 2023-09-28 | Stop reason: SDUPTHER

## 2020-07-01 NOTE — PROGRESS NOTES
"Subjective:       Patient ID: Riddhi Thapa is a 49 y.o. female.    Chief Complaint: Allergic Rhinitis  (f/u with AR, restarted injections today with a cluster)    HPI     Pt presents for allergic rhinitis and asthma    Asthma:  Condition: improved   Sx: tightness   Sx frequ:< 2 days per week   Jacqueline frequ: < 2 days per week   Controller: symbicort 160 2 BID, montelukast 10 mg po qday    Oral steroids: none for breathing , medrol for sciatica.     Rhinitis:  Allergic   Restarting ait   [place] exacerbation.   Tx: off nasal sprays and saline , pseudophed. Montelukast 10 mg po qday. - no black box changes   Sx: PND     Review of Systems      General: neg unexpected weight changes, fevers, chills, night sweats, malaise  HEENT: see hpi, Neg eye pain, vision changes, ear drainage, nose bleeds, throat tightness, sores in the mouth  CV: Neg chest pain, palpitations, swelling  Resp: see hpi, neg hemoptysis.  GI: see hpi, neg dysphagia, night abdominal pain, reflux, chronic diarrhea, chronic constipation  Derm: See Hpi, neg new rash, neg flushing  Mu/sk: Neg joint pain, joint swelling   Psych: Neg anxiety  neuro: neg chronic headaches, muscle weakness  Endo: neg heat/cold intolerance, chronic fatigue    Objective:     Vitals:    07/01/20 0850   BP: 128/80   Pulse: 82   Temp: 97.7 °F (36.5 °C)   Weight: 76.7 kg (169 lb)   Height: 5' 4" (1.626 m)   PF: 420 L/min        Physical Exam      General: no acute distress, well developed well nourished   HEENT:   Head:normocephalic atraumatic  Eyes: THANH, EOMI, Neg injection, scleral icterus, or conjunctival papillary hypertrophy.  Ears: tm clear bilaterally, normal canal  Nose:2 + inferior turbinates pink, neg nasal polyps            Mucosa: dry             Septal irritation: left sided   OP: mucus membranes moist, - cobblestoning, + PND, neg erythema or lesions  Neck: supple, Full range of motion, neg lymphadenopathy  Chest: full respiratory excursion no abnormal chest " abnormality  Resp: clear to ascultation bilaterally  CV: RRR, neg MRG, brisk capillary refill  Abdomen: BS+, non tender, non distended  Ext:  Neg clubbing, cyanosis, pitting edema  Skin: Neg rashes or lesions  Lymph: neg supraclavicular, axillary     Assessment:       1. Moderate persistent extrinsic asthma without complication    2. Allergic rhinitis due to cats    3. Seasonal allergic rhinitis due to pollen    4. Non-seasonal allergic rhinitis due to pollen        Plan:       Moderate persistent extrinsic asthma without complication    Allergic rhinitis due to cats    Seasonal allergic rhinitis due to pollen    Non-seasonal allergic rhinitis due to pollen  -     azelastine-fluticasone (DYMISTA) 137-50 mcg/spray Spry nassal spray; 1 spray by Each Nostril route 2 (two) times daily.  Dispense: 3 Bottle; Refill: 1             Instructions reviewed with patient - spiriva sample given in clinic today     Nose:  Saline first always :o) 1-2 times per day   Continue dymista 1 spray per nare twice per day   But may also use 1 spray per nare four times per day .     Lung:  Continue symbicort 2 puffs twice per day   spiriva 2 puffs once per day - if sick   Continue montelukast 1 pill once per day  No black box warning       Continue AIT build up. Instructions given.       Get flu shot yearly     F/u in 3 months     > 50 % spent in counseling and coordinating care.  Time spent with patient: 35 mins           Samara Jones M.D.  Allergy/Immunology  [facility]   [phone] phone  [phone] fax

## 2020-07-01 NOTE — PROGRESS NOTES
Immunotherapy Note:    Subjective:       Patient ID: Riddhi Thapa is a 49 y.o. female presents for cluster immunotherapy  Tolerated last injection  No New Medications  Beta Blockers: not on beta blocker        Reaction with last injection?: No  If female, are you pregnant?: No  Are you on any beta blockers?: No  Has the vial ?: No    Are you ill today?: No  Asthma exacerbation or symptoms: No  Do you have a fever today?: No    Full set of vitals taken, meds and history reviewed    Peak Flow: 420    Expiration Date #1: 20  Vial Concentration: 1:1,000 v/v (GREEN)  Dose (mL) #1: 0.1 mL  Location: Right upper arm  Given By: EL    Expiration Date #2: 20  Vial Concentration: 1:1,000 v/v (GREEN)  Dose (mL) #2: 0.1 mL  Location: Left upper arm  Given By : EL    Monitored 30 minutes    Peak Flow: 410    Expiration Date #1: 20  Vial Concentration: 1:1,000 v/v (GREEN)  Dose (mL) #1: 0.3 mL  Location: Right upper arm  Given By: EL    Expiration Date #2: 20  Vial Concentration: 1:1,000 v/v (GREEN)  Dose (mL) #2: 0.3 mL  Location: Left upper arm  Given By : EL    Monitored 30 minutes    Peak Flow: 410    Expiration Date #1: 20  Vial Concentration: 1:1,000 v/v (GREEN)  Dose (mL) #1: 0.5 mL  Location: Right upper arm  Given By: EL    Expiration Date #2: 20  Vial Concentration: 1:1,000 v/v (GREEN)  Dose (mL) #2: 0.5 mL  Location: Left upper arm  Given By : EL     Monitored 120 minutes.    Discharged in good condition with no complaints.       Objective:     Riddhi Thapa total procedure time 185 minutes.  Discharged in good condition.         Discussion:     Pt understands the current recommendation. All questions answered to the best of my ability. Continue current management plan.          Electronically signed by Lucretia Zuluaga    Allergy/Imunology  [facility]  [address]  [place] [address]   Office [phone]

## 2020-07-01 NOTE — PATIENT INSTRUCTIONS
Nose:  Saline first 1-2 times per day  Next azelastine 1 spray per nare twice per day - if symptoms worsen, may use up to 4 times per day   Then fluticasone or rhinocort 1 spray per nare twice per day    Technique:  Head down  Aim up and out   Spray don't sniff      Lung:  symbicort 160 2 puffs twice per day   Montelukast 10 mg po qday     Rescue albuterol as needed every 4-6 hours for cough wheeze shortness   Of breath.       Follow up in 3-6 months, sooner if needed.     Continue cluster.   Zyrtec on allergy shot days.     Bring albuterol with you.

## 2020-07-08 ENCOUNTER — CLINICAL SUPPORT (OUTPATIENT)
Dept: ALLERGY | Facility: CLINIC | Age: 50
End: 2020-07-08
Payer: COMMERCIAL

## 2020-07-08 VITALS
HEART RATE: 75 BPM | WEIGHT: 169 LBS | BODY MASS INDEX: 28.85 KG/M2 | TEMPERATURE: 98 F | HEIGHT: 64 IN | SYSTOLIC BLOOD PRESSURE: 124 MMHG | OXYGEN SATURATION: 99 % | DIASTOLIC BLOOD PRESSURE: 86 MMHG

## 2020-07-08 DIAGNOSIS — J30.81 ALLERGIC RHINITIS DUE TO CATS: ICD-10-CM

## 2020-07-08 DIAGNOSIS — J30.81 ALLERGIC RHINITIS DUE TO ANIMAL HAIR AND DANDER: ICD-10-CM

## 2020-07-08 PROCEDURE — 95180 RAPID DESENSITIZATION: CPT | Mod: S$GLB,,, | Performed by: ALLERGY & IMMUNOLOGY

## 2020-07-08 PROCEDURE — 95180 PR RAPID DESENSITIZATION PROC,EACH HOUR: ICD-10-PCS | Mod: S$GLB,,, | Performed by: ALLERGY & IMMUNOLOGY

## 2020-07-08 NOTE — PROGRESS NOTES
Immunotherapy Note:    Subjective:       Patient ID: Riddhi Thapa is a 49 y.o. female presents for cluster immunotherapy  Tolerated last injection  No New Medications  Beta Blockers: not on beta blocker    Reaction with last injection?: No  If female, are you pregnant?: No  Are you on any beta blockers?: No  Has the vial ?: No    Are you ill today?: No  Asthma exacerbation or symptoms: No  Do you have a fever today?: No     Full set of vitals obtained, assessment complete, meds and history reviewed    Peak Flow: 420    Expiration Date #1: 20  Vial Concentration: 1:100 v/v (BLUE)  Dose (mL) #1: 0.1 mL  Location: Right upper arm  Given By: EL    Expiration Date #2: 20  Vial Concentration: 1:100 v/v (BLUE)  Dose (mL) #2: 0.1 mL  Location: Left upper arm  Given By : EL     Monitored 30 minutes    Peak Flow: 420    Expiration Date #1: 20  Vial Concentration: 1:100 v/v (BLUE)  Dose (mL) #1: 0.3 mL  Location: Right upper arm  Given By: EL    Expiration Date #2: 20  Vial Concentration: 1:100 v/v (BLUE)  Dose (mL) #2: 0.3 mL  Location: Left upper arm  Given By : EL    Monitored 30 minutes    Peak Flow: 420    Expiration Date #1: 20  Vial Concentration: 1:100 v/v (BLUE)  Dose (mL) #1: 0.5 mL  Location: Right upper arm  Given By: EL    Expiration Date #2: 20  Vial Concentration: 1:100 v/v (BLUE)  Dose (mL) #2: 0.5 mL  Location: Left upper arm  Given By : EL    Monitored 2 hours    Objective:     Riddhi Thapa total procedure time 180 minutes.  Discharged in good condition.         Discussion:     Pt understands the current recommendation. All questions answered to the best of my ability. Continue current management plan.          Electronically signed by Lucretia Zuluaga    Allergy/Imunology  [facility]  [place] [address]  [place] [address]   Office [phone]

## 2020-07-15 ENCOUNTER — CLINICAL SUPPORT (OUTPATIENT)
Dept: ALLERGY | Facility: CLINIC | Age: 50
End: 2020-07-15
Payer: COMMERCIAL

## 2020-07-15 VITALS
HEART RATE: 84 BPM | SYSTOLIC BLOOD PRESSURE: 130 MMHG | BODY MASS INDEX: 28.85 KG/M2 | TEMPERATURE: 98 F | WEIGHT: 169 LBS | DIASTOLIC BLOOD PRESSURE: 81 MMHG | OXYGEN SATURATION: 98 % | HEIGHT: 64 IN

## 2020-07-15 DIAGNOSIS — J30.81 ALLERGIC RHINITIS DUE TO ANIMAL HAIR AND DANDER: ICD-10-CM

## 2020-07-15 PROCEDURE — 95180 RAPID DESENSITIZATION: CPT | Mod: S$GLB,,, | Performed by: ALLERGY & IMMUNOLOGY

## 2020-07-15 PROCEDURE — 95180 PR RAPID DESENSITIZATION PROC,EACH HOUR: ICD-10-PCS | Mod: S$GLB,,, | Performed by: ALLERGY & IMMUNOLOGY

## 2020-07-15 NOTE — PROGRESS NOTES
Immunotherapy Note:    Subjective:       Patient ID: Riddhi Thapa is a 50 y.o. female presents for cluster immunotherapy  Tolerated last injection  No New Medications  Beta Blockers: not on beta blocker    Reaction with last injection?: No  If female, are you pregnant?: No  Are you on any beta blockers?: No  Has the vial ?: No    Are you ill today?: No  Asthma exacerbation or symptoms: No  Do you have a fever today?: No     Full set of vitals obtained, assessment complete, meds and history reviewed    Peak Flow: 420    Expiration Date #1: 20  Vial Concentration: 1:10 v/v (YELLOW)  Dose (mL) #1: 0.1 mL  Location: Right upper arm  Given By: EL    Expiration Date #2: 20  Vial Concentration: 1:10 v/v (YELLOW)  Dose (mL) #2: 0.1 mL  Location: Left upper arm  Given By : EL    Peak Flow: 420    Expiration Date #1: 20  Vial Concentration: 1:10 v/v (YELLOW)  Dose (mL) #1: 0.25 mL  Location: Right upper arm  Given By: EL    Expiration Date #2: 20  Vial Concentration: 1:10 v/v (YELLOW)  Dose (mL) #2: 0.25 mL  Location: Left upper arm  Given By : EL     Monitored 2 hours    Objective:     Riddhi Thapa total procedure time 160 minutes.  Discharged in good condition.         Discussion:     Pt understands the current recommendation. All questions answered to the best of my ability. Continue current management plan.          Electronically signed by Lucretia Zuluaga    Allergy/Imunology  [facility]  [place] [address]  [place] [address]   Office [phone]

## 2020-07-22 ENCOUNTER — CLINICAL SUPPORT (OUTPATIENT)
Dept: ALLERGY | Facility: CLINIC | Age: 50
End: 2020-07-22
Payer: COMMERCIAL

## 2020-07-22 VITALS
DIASTOLIC BLOOD PRESSURE: 81 MMHG | BODY MASS INDEX: 28.85 KG/M2 | OXYGEN SATURATION: 98 % | TEMPERATURE: 98 F | HEART RATE: 95 BPM | WEIGHT: 169 LBS | SYSTOLIC BLOOD PRESSURE: 141 MMHG | HEIGHT: 64 IN

## 2020-07-22 DIAGNOSIS — J30.81 ALLERGIC RHINITIS DUE TO ANIMAL HAIR AND DANDER: ICD-10-CM

## 2020-07-22 PROCEDURE — 95180 RAPID DESENSITIZATION: CPT | Mod: S$GLB,,, | Performed by: ALLERGY & IMMUNOLOGY

## 2020-07-22 PROCEDURE — 95180 PR RAPID DESENSITIZATION PROC,EACH HOUR: ICD-10-PCS | Mod: S$GLB,,, | Performed by: ALLERGY & IMMUNOLOGY

## 2020-07-22 NOTE — PROGRESS NOTES
Immunotherapy Note:    Subjective:       Patient ID: Riddhi Thapa is a 50 y.o. female presents for cluster immunotherapy  Tolerated last injection  No New Medications  Beta Blockers: not on beta blocker    Reaction with last injection?: No  If female, are you pregnant?: No  Are you on any beta blockers?: No  Has the vial ?: No    Are you ill today?: No  Asthma exacerbation or symptoms: No  Do you have a fever today?: No     Full set of vitals obtained, assessment complete, meds and history reviewed    Peak Flow: 420    Expiration Date #1: 20  Vial Concentration: 1:10 v/v (YELLOW)  Dose (mL) #1: 0.3 mL  Location: Right upper arm  Given By: EL    Expiration Date #2: 20  Vial Concentration: 1:10 v/v (YELLOW)  Dose (mL) #2: 0.3 mL  Location: Left upper arm  Given By : EL    Monitored in clinic for 30 minutes    Peak Flow: 420    Expiration Date #1: 20  Vial Concentration: 1:10 v/v (YELLOW)  Dose (mL) #1: 0.5 mL  Location: Right upper arm  Given By: EL    Expiration Date #2: 20  Vial Concentration: 1:10 v/v (YELLOW)  Dose (mL) #2: 0.5 mL  Location: Left upper arm  Given By : EL     Monitored in clinic for 2 hours    Objective:     Riddhi Thapa total procedure time 160 minutes.  Discharged in good condition.         Discussion:     Pt understands the current recommendation. All questions answered to the best of my ability. Continue current management plan.          Electronically signed by Lucretia Zuluaga    Allergy/Imunology  [facility]  [facility]  [address]  [place] [address]   Office [phone]

## 2020-07-30 ENCOUNTER — CLINICAL SUPPORT (OUTPATIENT)
Dept: ALLERGY | Facility: CLINIC | Age: 50
End: 2020-07-30
Payer: COMMERCIAL

## 2020-07-30 VITALS — HEIGHT: 64 IN | WEIGHT: 169 LBS | BODY MASS INDEX: 28.85 KG/M2

## 2020-07-30 DIAGNOSIS — J30.81 ALLERGIC RHINITIS DUE TO ANIMAL HAIR AND DANDER: ICD-10-CM

## 2020-07-30 PROCEDURE — 95117 IMMUNOTHERAPY INJECTIONS: CPT | Mod: S$GLB,,, | Performed by: ALLERGY & IMMUNOLOGY

## 2020-07-30 PROCEDURE — 95117 PR IMMU2THERAPY, 2+ INJECTIONS: ICD-10-PCS | Mod: S$GLB,,, | Performed by: ALLERGY & IMMUNOLOGY

## 2020-07-30 NOTE — PROGRESS NOTES
Immunotherapy Note: Allergy Shot      Subjective:       Patient ID: Riddhi Thapa is a 50 y.o. female presents for weekly immunotherapy  Tolerated last injection  No New Medications  Beta Blockers: not on beta blocker    Reaction with last injection?: No  If female, are you pregnant?: No  Are you on any beta blockers?: No  Has the vial ?: No    Are you ill today?: No  Asthma exacerbation or symptoms: No  Do you have a fever today?: No    Peak Flow: 400    Expiration Date #1: 21  Vial Concentration: 1:1 v/v (RED)  Dose (mL) #1: 0.1 mL  Location: Right upper arm  Given By: EL    Expiration Date #2: 21  Vial Concentration: 1:1 v/v (RED)  Dose (mL) #2: 0.1 mL  Location: Left upper arm  Given By : EL                           Objective:     Riddhi Thapa observed for 30 minutes and discharged in good condition        Discussion:     Pt understands the current recommendation. All questions answered to the best of my ability. Continue current management plan.      Electronically signed by Lucretia Zuluaga    Allergy/Imunology  [facility]  [place] [address]  [place] [address]   Office [phone]

## 2020-08-03 ENCOUNTER — CLINICAL SUPPORT (OUTPATIENT)
Dept: ALLERGY | Facility: CLINIC | Age: 50
End: 2020-08-03
Payer: COMMERCIAL

## 2020-08-03 DIAGNOSIS — J30.81 ALLERGIC RHINITIS DUE TO ANIMAL HAIR AND DANDER: ICD-10-CM

## 2020-08-03 PROCEDURE — 95117 IMMUNOTHERAPY INJECTIONS: CPT | Mod: S$GLB,,, | Performed by: ALLERGY & IMMUNOLOGY

## 2020-08-03 PROCEDURE — 95117 PR IMMU2THERAPY, 2+ INJECTIONS: ICD-10-PCS | Mod: S$GLB,,, | Performed by: ALLERGY & IMMUNOLOGY

## 2020-08-03 NOTE — PROGRESS NOTES
Immunotherapy Note: Allergy Shot      Subjective:       Patient ID: Riddhi Thapa is a 50 y.o. female presents for weekly immunotherapy  Tolerated last injection  No New Medications  Beta Blockers: not on beta blocker    Reaction with last injection?: No  If female, are you pregnant?: No  Are you on any beta blockers?: No  Has the vial ?: No    Are you ill today?: No  Asthma exacerbation or symptoms: No  Do you have a fever today?: No    Peak Flow: 420    Expiration Date #1: 21  Vial Concentration: 1:1 v/v (RED)  Dose (mL) #1: 0.2 mL  Location: Right upper arm  Given By: EL    Expiration Date #2: 21  Vial Concentration: 1:1 v/v (RED)  Dose (mL) #2: 0.2 mL  Location: Left upper arm  Given By : EL                           Objective:     Riddhi Thapa observed for 30 minutes and discharged in good condition        Discussion:     Pt understands the current recommendation. All questions answered to the best of my ability. Continue current management plan.      Electronically signed by Lucretia Zuluaga    Allergy/Imunology  [facility]  [place] [address]  [place] [address]   Office [phone]

## 2020-08-05 ENCOUNTER — CLINICAL SUPPORT (OUTPATIENT)
Dept: ALLERGY | Facility: CLINIC | Age: 50
End: 2020-08-05
Payer: COMMERCIAL

## 2020-08-05 VITALS
BODY MASS INDEX: 28.85 KG/M2 | SYSTOLIC BLOOD PRESSURE: 125 MMHG | HEART RATE: 81 BPM | WEIGHT: 169 LBS | HEIGHT: 64 IN | TEMPERATURE: 98 F | DIASTOLIC BLOOD PRESSURE: 83 MMHG

## 2020-08-05 DIAGNOSIS — J30.81 ALLERGIC RHINITIS DUE TO ANIMAL HAIR AND DANDER: ICD-10-CM

## 2020-08-05 PROCEDURE — 95117 IMMUNOTHERAPY INJECTIONS: CPT | Mod: S$GLB,,, | Performed by: ALLERGY & IMMUNOLOGY

## 2020-08-05 PROCEDURE — 95117 PR IMMU2THERAPY, 2+ INJECTIONS: ICD-10-PCS | Mod: S$GLB,,, | Performed by: ALLERGY & IMMUNOLOGY

## 2020-08-05 NOTE — PROGRESS NOTES
Immunotherapy Note: Allergy Shot      Subjective:       Patient ID: Riddhi Thapa is a 50 y.o. female presents for weekly immunotherapy  Tolerated last injection  No New Medications  Beta Blockers: not on beta blocker    Reaction with last injection?: No  If female, are you pregnant?: No  Are you on any beta blockers?: No  Has the vial ?: No    Are you ill today?: No  Asthma exacerbation or symptoms: No  Do you have a fever today?: No    Peak Flow: 430    Expiration Date #1: 21  Vial Concentration: 1:1 v/v (RED)  Dose (mL) #1: 0.3 mL  Location: Right upper arm  Given By: EL    Expiration Date #2: 21  Vial Concentration: 1:1 v/v (RED)  Dose (mL) #2: 0.3 mL  Location: Left upper arm  Given By : EL                           Objective:     Riddhi Thapa observed for 30 minutes and discharged in good condition        Discussion:     Pt understands the current recommendation. All questions answered to the best of my ability. Continue current management plan.      Electronically signed by Lucretia Zuluaga    Allergy/Imunology  [facility]  [place] [address]  [place] [address]   Office [phone]

## 2020-08-10 ENCOUNTER — CLINICAL SUPPORT (OUTPATIENT)
Dept: ALLERGY | Facility: CLINIC | Age: 50
End: 2020-08-10
Payer: COMMERCIAL

## 2020-08-10 VITALS
SYSTOLIC BLOOD PRESSURE: 132 MMHG | TEMPERATURE: 98 F | BODY MASS INDEX: 28.85 KG/M2 | HEIGHT: 64 IN | HEART RATE: 83 BPM | WEIGHT: 169 LBS | DIASTOLIC BLOOD PRESSURE: 82 MMHG

## 2020-08-10 DIAGNOSIS — J30.81 ALLERGIC RHINITIS DUE TO ANIMAL HAIR AND DANDER: ICD-10-CM

## 2020-08-10 DIAGNOSIS — J30.81 ALLERGIC RHINITIS DUE TO CATS: ICD-10-CM

## 2020-08-10 PROCEDURE — 95117 PR IMMU2THERAPY, 2+ INJECTIONS: ICD-10-PCS | Mod: S$GLB,,, | Performed by: ALLERGY & IMMUNOLOGY

## 2020-08-10 PROCEDURE — 95117 IMMUNOTHERAPY INJECTIONS: CPT | Mod: S$GLB,,, | Performed by: ALLERGY & IMMUNOLOGY

## 2020-08-10 NOTE — PROGRESS NOTES
Immunotherapy Note: Allergy Shot      Subjective:       Patient ID: Riddhi Thapa is a 50 y.o. female presents for weekly immunotherapy  Tolerated last injection  No New Medications  Beta Blockers: not on beta blocker    Reaction with last injection?: No  If female, are you pregnant?: No  Are you on any beta blockers?: No  Has the vial ?: No    Are you ill today?: No  Asthma exacerbation or symptoms: No  Do you have a fever today?: No         Expiration Date #1: 21  Vial Concentration: 1:1 v/v (RED)  Dose (mL) #1: 0.4 mL  Location: Right upper arm  Given By: EL    Expiration Date #2: 21  Vial Concentration: 1:1 v/v (RED)  Dose (mL) #2: 0.4 mL  Location: Left upper arm  Given By : EL                           Objective:     Riddhi Thapa observed for 30 minutes and discharged in good condition        Discussion:     Pt understands the current recommendation. All questions answered to the best of my ability. Continue current management plan.      Electronically signed by Lucretia Zuluaga    Allergy/Imunology  [facility]  [facility]  [address]  [place] [address]   Office [phone]

## 2020-08-12 ENCOUNTER — CLINICAL SUPPORT (OUTPATIENT)
Dept: ALLERGY | Facility: CLINIC | Age: 50
End: 2020-08-12
Payer: COMMERCIAL

## 2020-08-12 VITALS
BODY MASS INDEX: 28.85 KG/M2 | WEIGHT: 169 LBS | HEIGHT: 64 IN | HEART RATE: 84 BPM | DIASTOLIC BLOOD PRESSURE: 84 MMHG | TEMPERATURE: 99 F | SYSTOLIC BLOOD PRESSURE: 119 MMHG | OXYGEN SATURATION: 98 %

## 2020-08-12 DIAGNOSIS — J30.81 ALLERGIC RHINITIS DUE TO ANIMAL HAIR AND DANDER: ICD-10-CM

## 2020-08-12 PROCEDURE — 95117 IMMUNOTHERAPY INJECTIONS: CPT | Mod: S$GLB,,, | Performed by: ALLERGY & IMMUNOLOGY

## 2020-08-12 PROCEDURE — 95117 PR IMMU2THERAPY, 2+ INJECTIONS: ICD-10-PCS | Mod: S$GLB,,, | Performed by: ALLERGY & IMMUNOLOGY

## 2020-08-12 NOTE — PROGRESS NOTES
Immunotherapy Note: Allergy Shot      Subjective:       Patient ID: Riddhi Thapa is a 50 y.o. female presents for weekly immunotherapy  Tolerated last injection  No New Medications  Beta Blockers: not on beta blocker    Reaction with last injection?: No  If female, are you pregnant?: No  Are you on any beta blockers?: No  Has the vial ?: No    Are you ill today?: No  Asthma exacerbation or symptoms: No  Do you have a fever today?: No    Peak Flow: 440    Expiration Date #1: 21  Vial Concentration: 1:1 v/v (RED)  Dose (mL) #1: 0.5 mL  Location: Right upper arm  Given By: EL    Expiration Date #2: 21  Vial Concentration: 1:1 v/v (RED)  Dose (mL) #2: 0.5 mL  Location: Left upper arm  Given By : EL                           Objective:     Riddhi Thapa observed for 30 minutes and discharged in good condition        Discussion:     Pt understands the current recommendation. All questions answered to the best of my ability. Continue current management plan.      Electronically signed by Lucretia Zuluaga    Allergy/Imunology  [facility]  [place] [address]  [place] [address]   Office [phone]

## 2020-08-27 ENCOUNTER — CLINICAL SUPPORT (OUTPATIENT)
Dept: ALLERGY | Facility: CLINIC | Age: 50
End: 2020-08-27
Payer: COMMERCIAL

## 2020-08-27 DIAGNOSIS — J30.81 ALLERGIC RHINITIS DUE TO ANIMAL HAIR AND DANDER: ICD-10-CM

## 2020-08-27 PROCEDURE — 95117 IMMUNOTHERAPY INJECTIONS: CPT | Mod: S$GLB,,, | Performed by: ALLERGY & IMMUNOLOGY

## 2020-08-27 PROCEDURE — 95117 PR IMMU2THERAPY, 2+ INJECTIONS: ICD-10-PCS | Mod: S$GLB,,, | Performed by: ALLERGY & IMMUNOLOGY

## 2020-08-27 NOTE — PROGRESS NOTES
Immunotherapy Note: Allergy Shot      Subjective:       Patient ID: Riddhi Thapa is a 50 y.o. female presents for weekly immunotherapy  Tolerated last injection  No New Medications  Beta Blockers: not on beta blocker    Reaction with last injection?: No  If female, are you pregnant?: No  Are you on any beta blockers?: No  Has the vial ?: No    Are you ill today?: No  Asthma exacerbation or symptoms: No  Do you have a fever today?: No    Peak Flow: 420    Expiration Date #1: 21  Vial Concentration: 1:1 v/v (RED)  Dose (mL) #1: 0.5 mL  Location: Right upper arm  Given By: EL    Expiration Date #2: 21  Vial Concentration: 1:1 v/v (RED)  Dose (mL) #2: 0.5 mL  Location: Left upper arm  Given By : EL         Objective:     Riddhi Thapa observed for 30 minutes and discharged in good condition        Discussion:     Pt understands the current recommendation. All questions answered to the best of my ability. Continue current management plan.      Electronically signed by Lucretia Zuluaga    Allergy/Imunology  [facility]  [place] [address]  [place] [address]   Office [phone]

## 2020-09-15 NOTE — PROGRESS NOTES
Immunotherapy Note: Allergy Shot      Subjective:       Patient ID: Riddhi Thapa is a 47 y.o. female presents for monthly immunotherapy  Tolerated last injection  No New Medications  Beta Blockers: not on beta blocker             bilateral upper arms 1: 1 concentration   0.5 mL each vaccine rx vial 1 and 2.         Objective:     Riddhi Thapa observed for 30 minutes and discharged in good condition        Discussion:     Pt understands the current recommendation. All questions answered to the best of my ability. Continue current management plan.          Electronically signed by Samara Jones    Allergy/Imunology  [facility]  [address]  [place] [address]   Office [phone]     Bed: 09  Expected date:   Expected time:   Means of arrival:   Comments:

## 2020-09-23 LAB
25(OH)D3 SERPL-MCNC: 79 NG/ML (ref 30–100)
BUN SERPL-MCNC: 12 MG/DL (ref 7–25)
BUN/CREAT SERPL: NORMAL (CALC) (ref 6–22)
CALCIUM SERPL-MCNC: 9.2 MG/DL (ref 8.6–10.2)
CHLORIDE SERPL-SCNC: 103 MMOL/L (ref 98–110)
CHOLEST SERPL-MCNC: 176 MG/DL
CHOLEST/HDLC SERPL: 3.2 (CALC)
CO2 SERPL-SCNC: 29 MMOL/L (ref 20–32)
CREAT SERPL-MCNC: 0.72 MG/DL (ref 0.5–1.1)
GFRSERPLBLD MDRD-ARVRAT: 99 ML/MIN/1.73M2
GLUCOSE SERPL-MCNC: 90 MG/DL (ref 65–99)
HDLC SERPL-MCNC: 55 MG/DL
LDLC SERPL CALC-MCNC: 100 MG/DL (CALC)
NONHDLC SERPL-MCNC: 121 MG/DL (CALC)
POTASSIUM SERPL-SCNC: 4.1 MMOL/L (ref 3.5–5.3)
SODIUM SERPL-SCNC: 140 MMOL/L (ref 135–146)
TRIGL SERPL-MCNC: 116 MG/DL
TSH SERPL-ACNC: 2.58 MIU/L

## 2020-09-24 ENCOUNTER — CLINICAL SUPPORT (OUTPATIENT)
Dept: ALLERGY | Facility: CLINIC | Age: 50
End: 2020-09-24
Payer: COMMERCIAL

## 2020-09-24 VITALS — TEMPERATURE: 98 F | HEIGHT: 64 IN | OXYGEN SATURATION: 98 % | BODY MASS INDEX: 28.85 KG/M2 | WEIGHT: 169 LBS

## 2020-09-24 DIAGNOSIS — J30.81 ALLERGIC RHINITIS DUE TO ANIMAL HAIR AND DANDER: ICD-10-CM

## 2020-09-24 PROCEDURE — 95117 PR IMMU2THERAPY, 2+ INJECTIONS: ICD-10-PCS | Mod: S$GLB,,, | Performed by: ALLERGY & IMMUNOLOGY

## 2020-09-24 PROCEDURE — 95117 IMMUNOTHERAPY INJECTIONS: CPT | Mod: S$GLB,,, | Performed by: ALLERGY & IMMUNOLOGY

## 2020-09-24 NOTE — PROGRESS NOTES
Immunotherapy Note: Allergy Shot      Subjective:       Patient ID: Riddhi Thapa is a 50 y.o. female presents for weekly immunotherapy  Tolerated last injection  No New Medications  Beta Blockers: not on beta blocker    Reaction with last injection?: No  If female, are you pregnant?: No  Are you on any beta blockers?: No  Has the vial ?: No    Are you ill today?: No  Asthma exacerbation or symptoms: No  Do you have a fever today?: No    Peak Flow: 430    Expiration Date #1: 21  Vial Concentration: 1:1 v/v (RED)  Dose (mL) #1: 0.5 mL  Location: Right upper arm  Given By: EL    Expiration Date #2: 21  Vial Concentration: 1:1 v/v (RED)  Dose (mL) #2: 0.5 mL  Location: Left upper arm  Given By : EL         Objective:     Riddhi Thapa observed for 30 minutes and discharged in good condition        Discussion:     Pt understands the current recommendation. All questions answered to the best of my ability. Continue current management plan.      Electronically signed by Lucretia Zuluaga    Allergy/Imunology  [facility]  [place] [address]  [place] [address]   Office [phone]

## 2020-10-07 ENCOUNTER — OFFICE VISIT (OUTPATIENT)
Dept: FAMILY MEDICINE | Facility: CLINIC | Age: 50
End: 2020-10-07
Payer: COMMERCIAL

## 2020-10-07 VITALS
BODY MASS INDEX: 28.17 KG/M2 | SYSTOLIC BLOOD PRESSURE: 120 MMHG | TEMPERATURE: 99 F | DIASTOLIC BLOOD PRESSURE: 74 MMHG | HEIGHT: 64 IN | OXYGEN SATURATION: 99 % | HEART RATE: 77 BPM | WEIGHT: 165 LBS

## 2020-10-07 DIAGNOSIS — D23.5 CYST, DERMOID, TRUNK: ICD-10-CM

## 2020-10-07 DIAGNOSIS — E06.3 HASHIMOTO'S THYROIDITIS: Primary | ICD-10-CM

## 2020-10-07 DIAGNOSIS — E66.3 OVERWEIGHT (BMI 25.0-29.9): ICD-10-CM

## 2020-10-07 DIAGNOSIS — E55.9 VITAMIN D INSUFFICIENCY: ICD-10-CM

## 2020-10-07 DIAGNOSIS — Z23 IMMUNIZATION DUE: ICD-10-CM

## 2020-10-07 DIAGNOSIS — E78.2 MIXED HYPERLIPIDEMIA: ICD-10-CM

## 2020-10-07 PROCEDURE — 3008F PR BODY MASS INDEX (BMI) DOCUMENTED: ICD-10-PCS | Mod: S$GLB,,, | Performed by: NURSE PRACTITIONER

## 2020-10-07 PROCEDURE — 90471 IMMUNIZATION ADMIN: CPT | Mod: S$GLB,,, | Performed by: NURSE PRACTITIONER

## 2020-10-07 PROCEDURE — 99214 OFFICE O/P EST MOD 30 MIN: CPT | Mod: 25,S$GLB,, | Performed by: NURSE PRACTITIONER

## 2020-10-07 PROCEDURE — 99214 PR OFFICE/OUTPT VISIT, EST, LEVL IV, 30-39 MIN: ICD-10-PCS | Mod: 25,S$GLB,, | Performed by: NURSE PRACTITIONER

## 2020-10-07 PROCEDURE — 90471 FLU VACCINE (QUAD) GREATER THAN OR EQUAL TO 3YO PRESERVATIVE FREE IM: ICD-10-PCS | Mod: S$GLB,,, | Performed by: NURSE PRACTITIONER

## 2020-10-07 PROCEDURE — 90686 FLU VACCINE (QUAD) GREATER THAN OR EQUAL TO 3YO PRESERVATIVE FREE IM: ICD-10-PCS | Mod: S$GLB,,, | Performed by: NURSE PRACTITIONER

## 2020-10-07 PROCEDURE — 3008F BODY MASS INDEX DOCD: CPT | Mod: S$GLB,,, | Performed by: NURSE PRACTITIONER

## 2020-10-07 PROCEDURE — 90686 IIV4 VACC NO PRSV 0.5 ML IM: CPT | Mod: S$GLB,,, | Performed by: NURSE PRACTITIONER

## 2020-10-07 RX ORDER — LEVOTHYROXINE SODIUM 50 UG/1
50 TABLET ORAL
Qty: 90 TABLET | Refills: 1 | Status: SHIPPED | OUTPATIENT
Start: 2020-10-07 | End: 2021-04-08 | Stop reason: SDUPTHER

## 2020-10-07 RX ORDER — AZELASTINE HYDROCHLORIDE, FLUTICASONE PROPIONATE 137; 50 UG/1; UG/1
1 SPRAY, METERED NASAL 2 TIMES DAILY
COMMUNITY
End: 2021-07-20

## 2020-10-07 NOTE — PROGRESS NOTES
Subjective:       Patient ID: Riddhi Thapa is a 50 y.o. female.    Chief Complaint: Hyperlipidemia (lab review) and Thyroid Problem    Patient presents today following up for her 6 month checkup on chronic conditions including anxiety, depressed mood, hyperlipidemia, and hypothyroidism.  Patient is tolerating current medication regimen well with no side effects.  Patient is overweight with a BMI of 28.32    Thyroid Problem  Presents for follow-up visit. Symptoms include anxiety (controlled) and depressed mood. Patient reports no cold intolerance, dry skin, hair loss, heat intolerance, hoarse voice, nail problem, palpitations or tremors. The symptoms have been stable. Her past medical history is significant for hyperlipidemia. There is no history of diabetes.   Hyperlipidemia  This is a recurrent problem. The current episode started more than 1 month ago. The problem is controlled. Recent lipid tests were reviewed and are variable. Exacerbating diseases include hypothyroidism and obesity. She has no history of chronic renal disease, diabetes, liver disease or nephrotic syndrome. Factors aggravating her hyperlipidemia include fatty foods. Pertinent negatives include no chest pain, focal weakness, leg pain or shortness of breath. Current antihyperlipidemic treatment includes diet change and exercise. The current treatment provides significant improvement of lipids. Compliance problems include adherence to diet and adherence to exercise.  Risk factors for coronary artery disease include stress, dyslipidemia and obesity.   Anxiety  Presents for follow-up visit. Symptoms include decreased concentration (controlled), depressed mood, excessive worry, irritability and nervous/anxious behavior (controlled). Patient reports no chest pain, confusion, nausea, palpitations, shortness of breath or suicidal ideas. Symptoms occur occasionally. The severity of symptoms is mild. The quality of sleep is fair. Nighttime awakenings:  occasional.     Compliance with medications is %.   Depression  Visit Type: follow-up  Patient presents with the following symptoms: decreased concentration (controlled), depressed mood, excessive worry, fatigue, irritability and nervousness/anxiety (controlled).  Patient is not experiencing: confusion, palpitations, shortness of breath, suicidal ideas, suicidal planning and thoughts of death.  Frequency of symptoms: rarely   Severity: mild   Sleep quality: fair  Nighttime awakenings: occasional  Compliance with medications:  %          Review of Systems   Constitutional: Positive for irritability. Negative for activity change, appetite change and fever.        Obesity   HENT: Negative for congestion, ear discharge, ear pain, hoarse voice, sore throat, trouble swallowing and voice change.    Eyes: Negative for photophobia, pain, discharge and visual disturbance.   Respiratory: Negative for cough, chest tightness and shortness of breath.    Cardiovascular: Negative for chest pain and palpitations.        Hyperlipidemia   Gastrointestinal: Negative for abdominal pain, nausea and vomiting.        GERD   Endocrine: Negative for cold intolerance and heat intolerance.        Hypothyroid   Genitourinary: Negative for difficulty urinating and dysuria.   Musculoskeletal: Negative for arthralgias and gait problem.   Skin: Negative for rash.        Cyst on back   Allergic/Immunologic: Negative for immunocompromised state.   Neurological: Negative for tremors, focal weakness, speech difficulty and headaches.   Psychiatric/Behavioral: Positive for decreased concentration (controlled), depression and dysphoric mood (controlled). Negative for confusion, self-injury and suicidal ideas. The patient is nervous/anxious (controlled).        Past Medical History:   Diagnosis Date    Anxiety     Asthma     Depression     GERD (gastroesophageal reflux disease)     Hashimoto's thyroiditis     Hyperlipidemia      Hypothyroidism     Kidney stones       Past Surgical History:   Procedure Laterality Date    EXTRACORPOREAL SHOCK WAVE LITHOTRIPSY  2011    HYSTERECTOMY      LITHOTRIPSY      SINUS SURGERY  2015    TUBAL LIGATION      Bi-Lat       Family History   Problem Relation Age of Onset    Eczema Mother     Thyroid disease Mother     Cancer Father         Renal    Eczema Father     COPD Maternal Grandmother     Diabetes Maternal Grandmother     Stroke Maternal Grandmother     Thyroid disease Maternal Grandmother     Breast cancer Paternal Aunt     Asthma Neg Hx     Immunodeficiency Neg Hx     Rhinitis Neg Hx        Social History     Socioeconomic History    Marital status:      Spouse name: Not on file    Number of children: Not on file    Years of education: Not on file    Highest education level: Not on file   Occupational History    Occupation: Teacher     Comment: [employer]    Social Needs    Financial resource strain: Not hard at all    Food insecurity     Worry: Never true     Inability: Never true    Transportation needs     Medical: No     Non-medical: No   Tobacco Use    Smoking status: Never Smoker    Smokeless tobacco: Never Used   Substance and Sexual Activity    Alcohol use: Yes     Frequency: 2-4 times a month     Drinks per session: 3 or 4     Binge frequency: Monthly     Comment: weekends only    Drug use: No    Sexual activity: Yes     Partners: Male   Lifestyle    Physical activity     Days per week: 4 days     Minutes per session: 30 min    Stress: Only a little   Relationships    Social connections     Talks on phone: More than three times a week     Gets together: Once a week     Attends [employer] service: Not on file     Active member of club or organization: No     Attends meetings of clubs or organizations: Never     Relationship status:    Other Topics Concern    Not on file   Social History Narrative    Not on file       Current Outpatient  "Medications   Medication Sig Dispense Refill    albuterol (PROAIR HFA) 90 mcg/actuation inhaler Inhale 2 puffs into the lungs every 6 (six) hours as needed for Wheezing. 1 Inhaler 2    azelastine-fluticasone (DYMISTA) 137-50 mcg/spray Spry nassal spray 1 spray by Each Nostril route 2 (two) times daily. 3 Bottle 1    azelastine-fluticasone (DYMISTA) 137-50 mcg/spray Spry nassal spray 1 spray by Each Nostril route 2 (two) times daily.      budesonide-formoterol 160-4.5 mcg (SYMBICORT) 160-4.5 mcg/actuation HFAA Inhale 2 puffs into the lungs every 12 (twelve) hours. Controller 1 Inhaler 3    cetirizine (ZYRTEC) 10 mg Cap Take 1 capsule by mouth daily as needed.      cholecalciferol, vitamin D3, (VITAMIN D3) 1,000 unit capsule Take 1 capsule by mouth Daily.      dexlansoprazole (DEXILANT) 60 mg capsule Take 60 mg by mouth once daily.        dextroamphetamine-amphetamine (ADDERALL XR) 30 MG 24 hr capsule Take 30 mg by mouth every morning.      levothyroxine (SYNTHROID) 50 MCG tablet Take 1 tablet (50 mcg total) by mouth before breakfast. 90 tablet 1    peak flow meter (PERSONAL BEST FULL RANGE) Claudine To be used as directed by MD 1 each 0    valacyclovir (VALTREX) 500 MG tablet Take 1 tablet by mouth as needed.       Current Facility-Administered Medications   Medication Dose Route Frequency Provider Last Rate Last Dose    Allergy Mix   Subcutaneous Q30 Days Samara Jones MD           Review of patient's allergies indicates:   Allergen Reactions    Nsaids (non-steroidal anti-inflammatory drug)      causes upset stomach, has GERD     Objective:      Blood pressure 120/74, pulse 77, temperature 99.1 °F (37.3 °C), height 5' 4" (1.626 m), weight 74.8 kg (165 lb), last menstrual period 09/16/2012, SpO2 99 %. Body mass index is 28.32 kg/m².   Physical Exam  Vitals signs and nursing note reviewed.   Constitutional:       General: She is not in acute distress.     Appearance: Normal appearance. She is " well-developed. She is not ill-appearing.      Comments: Obesity   HENT:      Head: Normocephalic and atraumatic.      Right Ear: Tympanic membrane and external ear normal.      Left Ear: Tympanic membrane and external ear normal.      Nose: Nose normal.      Mouth/Throat:      Mouth: Mucous membranes are moist.   Eyes:      General: Lids are normal. Lids are everted, no foreign bodies appreciated.      Conjunctiva/sclera: Conjunctivae normal.      Pupils: Pupils are equal, round, and reactive to light.      Right eye: Pupil is round and reactive.      Left eye: Pupil is round and reactive.   Neck:      Musculoskeletal: Normal range of motion and neck supple.      Thyroid: No thyromegaly.      Trachea: Trachea normal.   Cardiovascular:      Rate and Rhythm: Normal rate and regular rhythm.      Pulses: Normal pulses.      Heart sounds: Normal heart sounds, S1 normal and S2 normal. No murmur.   Pulmonary:      Effort: Pulmonary effort is normal. No respiratory distress.      Breath sounds: Normal breath sounds. No wheezing.   Abdominal:      General: Abdomen is flat. Bowel sounds are normal.      Palpations: Abdomen is soft. Abdomen is not rigid.      Tenderness: There is no guarding.   Musculoskeletal: Normal range of motion.   Lymphadenopathy:      Cervical: No cervical adenopathy.   Skin:     General: Skin is warm and dry.      Capillary Refill: Capillary refill takes less than 2 seconds.   Neurological:      General: No focal deficit present.      Mental Status: She is alert and oriented to person, place, and time. Mental status is at baseline.   Psychiatric:         Mood and Affect: Mood is not anxious (controlled ) or depressed (controlled).         Speech: Speech normal.         Behavior: Behavior normal. Behavior is cooperative.         Thought Content: Thought content normal.         Judgment: Judgment normal.      Comments: Attention deficit controlled             Assessment:       1. Hashimoto's thyroiditis     2. Mixed hyperlipidemia    3. Vitamin D insufficiency    4. Cyst, dermoid, trunk    5. Immunization due    6. Overweight (BMI 25.0-29.9)        Plan:       Riddhi was seen today for hyperlipidemia and thyroid problem.    Diagnoses and all orders for this visit:    Hashimoto's thyroiditis  -     levothyroxine (SYNTHROID) 50 MCG tablet; Take 1 tablet (50 mcg total) by mouth before breakfast.  -     CBC auto differential; Future  -     Comprehensive Metabolic Panel; Future  -     TSH; Future  -     Urinalysis; Future  -     CBC auto differential  -     Comprehensive Metabolic Panel  -     TSH  -     Urinalysis  Stable.  Continue current synthroid dose    Mixed hyperlipidemia  -     Lipid Panel; Future  -     Lipid Panel  Stable.  Continue lifestyle and exercise.    Vitamin D insufficiency  -     Vitamin D; Future  -     Vitamin D  Reduce vitamin D dose from 5000 units to 3000 units daily.    Cyst, dermoid, trunk  Scheduled to see dermatology in 1 week.     Immunization due  -     Influenza - Quadrivalent (PF)  Completed    Overweight (BMI 25.0-29.9)  The patient is asked to make an attempt to improve diet and exercise patterns to aid in medical management of this problem.    Follow up in 6 months for cholesterol, mood, and thyroid.

## 2020-10-07 NOTE — PATIENT INSTRUCTIONS
"  Controlling Your Cholesterol  Cholesterol is a waxy substance. It travels in your blood through the blood vessels. When you have high cholesterol, it builds up in the walls of the blood vessels. This makes the vessels narrower. Blood flow decreases. You are then at greater risk for having a heart attack or a stroke.  Good and bad cholesterol  Lipids are fats. Blood is mostly water. Fat and water don't mix. So our bodies need lipoproteins (lipids inside a protein shell) to carry the lipids. The protein shell carries its lipids through the bloodstream. There are two main kinds of lipoproteins:  · LDL (low-density lipoprotein) is known as "bad cholesterol." It mainly carries cholesterol. It delivers this cholesterol to body cells. Excess LDL cholesterol will build up in artery walls. This increases your risk for heart disease and stroke.  · HDL (high-density lipoprotein) is known as "good cholesterol." This protein shell collects excess cholesterol that LDLs have left behind on blood vessel walls. That's why high levels of HDL cholesterol can decrease your risk of heart disease and stroke.  Controlling cholesterol levels  Total cholesterol includes LDL and HDL cholesterol, as well as other fats in the bloodstream. If your total cholesterol is high, follow the steps below to help lower your total cholesterol level:  · Eat less unhealthy fat:  ¨ Cut back on saturated fats and trans (also called hydrogenated) fats by selecting lean cuts of meat, low-fat dairy, and using oils instead of solid fats. Limit baked goods, processed meats, and fried foods. A diet thats high in these fats increases your bad cholesterol. It's not enough to just cut back on foods containing cholesterol.  ¨ Eat about 2 servings of fish per week. Most fish contain omega-3 fatty acids. These help lower blood cholesterol.  ¨ Eat more whole grains and soluble fiber (such as oat bran). These lower overall cholesterol.  · Be active:  ¨ Choose an " activity you enjoy. Walking, swimming, and riding a bike are some good ways to be active.  ¨ Start at a level where you feel comfortable. Increase your time and pace a little each week.  ¨ Work up to 40 minutes of moderate to high intensity physical activity at least 3 to 4 days per week.  ¨ Remember, some activity is better than none.  ¨ If you haven't been exercising regularly, start slowly. Check with your doctor to make sure the exercise plan is right for you.  · Quit smoking. Quitting smoking can improve your lipid levels. It also lowers your risk for heart disease and stroke.  · Weight management. If you are overweight or obese, your health care provider will work with you to lose weight and lower your BMI (body mass index) to a normal or near-normal level. Making diet changes and increasing physical activity can help.  · Take medication as directed. Many people need medication to get their LDL levels to a safe level. Medication to lower cholesterol levels is effective and safe. (But taking medication is not a substitute for exercise or watching your diet!) Your doctor can tell you whether you might benefit from a cholesterol-lowering medication.  Date Last Reviewed: 5/11/2015  © 1223-0692 [employer]. [address] [place] [address], [place] [address]. All rights reserved. This information is not intended as a substitute for professional medical care. Always follow your healthcare professional's instructions.        Adult Immunization Schedule  Vaccine How Often Disease Prevented Who Needs It   Influenza Every year Flu. This can be especially dangerous to elderly adults or people with immune disorders. All adults   Tetanus, diphtheria (Td); or Tetanus, diphtheria, and pertussis (Tdap)* One dose of Tdap, then one dose of Td as a booster every 10 years Tetanus (lockjaw), a disease that causes muscles to spasm  Diphtheria, an infection that causes fever, weakness, and breathing problems  Pertussis, also  known as whooping cough. This is a highly contagious disease that can cause serious illness. All adults  *This vaccine should be given during each pregnancy no matter how many years since the last vaccine. The vaccine increases protection for your . A  is too young to get the vaccine, but at the highest risk for severe illness and death from pertussis.   Varicella (Maximo)** One series of 2 injections Chickenpox. This is a disease that causes itchy skin bumps, fever, and tiredness. It can lead to scarring, pneumonia, or brain inflammation. Adults who dont have evidence of immunity  **This vaccine should not be given to pregnant women. Women should avoid pregnancy for 4 weeks after the vaccine.   Human papillomavirus (HPV) One series of 3 injections Cervical cancer, caused by certain types of HPV  Vaginal and vulvar cancer  Penile cancer  Head and neck cancers  Anal cancer  Genital warts Females and males ages 15 to 26  Ask your healthcare provider if this vaccine is right for you.   Zoster--- 1 dose Herpes zoster (shingles), a painful rash marked by blisters Adults ages 60 and older.  ---You should not get this vaccine if your immune system is low. For example, if you have HIV, are taking medicines that suppress your immune system, or are getting cancer treatment.   Measles, mumps, rubella (MMR)** 1 or 2 doses, for ages 19 through 49; 1 dose for ages 50 and older if at risk Measles, a disease marked by red spots, fever, and coughing  Mumps, a disease that causes swelling in the salivary glands. It may affect the ovaries or testes.  Rubella (Czech measles). This is a form of measles that can cause birth defects if a pregnant woman catches it. Adults born in 1957 or later who are not known to be immune to all 3 of these diseases. Ask your healthcare provider if you need a second dose.  **This vaccine should not be given to pregnant women. Women should avoid pregnancy for 4 weeks after vaccination.    Pneumococcal (PCV 13) 1 dose Pneumonia. This is an infection that causes inflammation in your lungs. It can lead to death. Adults ages 65 and older. Also, adults ages 19 and older with weak immune systems or any of these health conditions: chronic renal failure, nephrotic syndrome, functional or anatomic asplenia, cerebrospinal fluid (CSF) leaks, or cochlear implants. This vaccine adds extra protection to PCV 23 and should be given about 2 months before PCV 23.   Pneumococcal (PPSV23)  1 or 2 doses Pneumonia. This is an infection that causes inflammation in your lungs. It can lead to death. Adults ages 65 and older. Also, adults with chronic illnesses, such as asthma, COPD, heart disease, diabetes, liver disease, alcoholism, sickle cell disease, or history of splenectomy. In addition, adults with an immune disorder and adults who smoke cigarettes. This vaccine is recommended for all adults regardless of immune status.   Meningococcal  (MCV or MPSV) 1 or more doses Meningococcal disease (bacterial meningitis). This is inflammation of the membrane covering the brain and spinal cord. It can lead to death. Adults with immune deficiencies or high risk of exposure. Also, college freshmen living in dormitories and  recruits.   Hepatitis A (HepA) One series of 2 injections Hepatitis A. This is an infection that can result in acute liver inflammation and yellow skin and whites of the eyes (jaundice). Adults with risk factors, such as clotting disorders or chronic liver disease, and adults with high risk of exposure. This includes men who have sex with men, IV drug users, and travelers to countries where hepatitis A is common.   Hepatitis B (HepB) One series of 3 injections Hepatitis B. This is an infection that causes chronic, severe liver disease. Adults with high risk of exposure, such as healthcare providers and sanitation workers, and adults with diabetes   Travelers diseases As needed Infections such as  cholera, typhoid, yellow fever, polio, rabies, meningococcal disease, hepatitis A, hepatitis B Adults traveling out of the country. Required vaccines will vary, depending on the country you visit. Check the CDC website: www.cdc.gov.    ,  Based on the CDC National Immunization Program recommendations for adults.  Date Last Reviewed: 2/1/2017  © 1113-3829 [employer]. [address] [place] [address]. All rights reserved. This information is not intended as a substitute for professional medical care. Always follow your healthcare professional's instructions.

## 2020-11-03 ENCOUNTER — CLINICAL SUPPORT (OUTPATIENT)
Dept: PULMONOLOGY | Facility: CLINIC | Age: 50
End: 2020-11-03
Payer: COMMERCIAL

## 2020-11-03 VITALS
HEIGHT: 64 IN | TEMPERATURE: 98 F | BODY MASS INDEX: 28.17 KG/M2 | HEART RATE: 90 BPM | OXYGEN SATURATION: 99 % | WEIGHT: 165 LBS

## 2020-11-03 DIAGNOSIS — J30.81 ALLERGIC RHINITIS DUE TO ANIMAL HAIR AND DANDER: ICD-10-CM

## 2020-11-03 PROCEDURE — 95117 PR IMMU2THERAPY, 2+ INJECTIONS: ICD-10-PCS | Mod: S$GLB,,, | Performed by: INTERNAL MEDICINE

## 2020-11-03 PROCEDURE — 95117 IMMUNOTHERAPY INJECTIONS: CPT | Mod: S$GLB,,, | Performed by: INTERNAL MEDICINE

## 2020-11-03 NOTE — PROGRESS NOTES
Immunotherapy Note: Allergy Shot      Subjective:       Patient ID: Riddhi Thapa is a 50 y.o. female presents for weekly immunotherapy  Tolerated last injection  No New Medications  Beta Blockers: not on beta blocker    Reaction with last injection?: No  If female, are you pregnant?: No  Are you on any beta blockers?: No  Has the vial ?: No    Are you ill today?: No  Asthma exacerbation or symptoms: No  Do you have a fever today?: No    Peak Flow: 400    Expiration Date #1: 21  Vial Concentration: 1:1 v/v (RED)  Dose (mL) #1: 0.5 mL  Location: Right upper arm  Given By: EL    Expiration Date #2: 21  Vial Concentration: 1:1 v/v (RED)  Dose (mL) #2: 0.5 mL  Location: Left upper arm  Given By : EL      Objective:     Riddhi Thapa observed for 30 minutes and discharged in good condition        Discussion:     Pt understands the current recommendation. All questions answered to the best of my ability. Continue current management plan.      Electronically signed by Lucretia Zuluaga

## 2020-12-01 ENCOUNTER — CLINICAL SUPPORT (OUTPATIENT)
Dept: ALLERGY | Facility: CLINIC | Age: 50
End: 2020-12-01
Payer: COMMERCIAL

## 2020-12-01 VITALS
HEIGHT: 64 IN | OXYGEN SATURATION: 98 % | TEMPERATURE: 97 F | DIASTOLIC BLOOD PRESSURE: 84 MMHG | BODY MASS INDEX: 28.17 KG/M2 | HEART RATE: 88 BPM | WEIGHT: 165 LBS | SYSTOLIC BLOOD PRESSURE: 124 MMHG

## 2020-12-01 DIAGNOSIS — J30.81 ALLERGIC RHINITIS DUE TO ANIMAL HAIR AND DANDER: ICD-10-CM

## 2020-12-01 PROCEDURE — 95117 PR IMMU2THERAPY, 2+ INJECTIONS: ICD-10-PCS | Mod: S$GLB,,, | Performed by: ALLERGY & IMMUNOLOGY

## 2020-12-01 PROCEDURE — 95117 IMMUNOTHERAPY INJECTIONS: CPT | Mod: S$GLB,,, | Performed by: ALLERGY & IMMUNOLOGY

## 2020-12-01 NOTE — PROGRESS NOTES
Immunotherapy Note: Allergy Shot      Subjective:       Patient ID: Riddhi Thapa is a 50 y.o. female presents for monthly immunotherapy  Tolerated last injection  No New Medications  Beta Blockers: not on beta blocker    Reaction with last injection?: No  If female, are you pregnant?: No  Are you on any beta blockers?: No  Has the vial ?: No    Are you ill today?: No  Asthma exacerbation or symptoms: No  Do you have a fever today?: No    Peak Flow: 400    Expiration Date #1: 21  Vial Concentration: 1:1 v/v (RED)  Dose (mL) #1: 0.5 mL  Location: Right upper arm  Given By: EL    Expiration Date #2: 21  Vial Concentration: 1:1 v/v (RED)  Dose (mL) #2: 0.5 mL  Location: Left upper arm  Given By : EL         Objective:     Riddhi Thapa observed for 30 minutes and discharged in good condition        Discussion:     Pt understands the current recommendation. All questions answered to the best of my ability. Continue current management plan.      Electronically signed by Lucretia Zuluaga    Allergy/Imunology  [facility]  [place] [address]  [place] [address]   Office [phone]

## 2020-12-30 ENCOUNTER — CLINICAL SUPPORT (OUTPATIENT)
Dept: ALLERGY | Facility: CLINIC | Age: 50
End: 2020-12-30
Payer: COMMERCIAL

## 2020-12-30 VITALS
WEIGHT: 165 LBS | TEMPERATURE: 98 F | OXYGEN SATURATION: 99 % | SYSTOLIC BLOOD PRESSURE: 126 MMHG | HEIGHT: 64 IN | BODY MASS INDEX: 28.17 KG/M2 | HEART RATE: 101 BPM | DIASTOLIC BLOOD PRESSURE: 89 MMHG

## 2020-12-30 DIAGNOSIS — J30.81 ALLERGIC RHINITIS DUE TO ANIMAL HAIR AND DANDER: ICD-10-CM

## 2020-12-30 DIAGNOSIS — J30.81 ALLERGIC RHINITIS DUE TO CATS: ICD-10-CM

## 2020-12-30 PROCEDURE — 95117 PR IMMU2THERAPY, 2+ INJECTIONS: ICD-10-PCS | Mod: S$GLB,,, | Performed by: ALLERGY & IMMUNOLOGY

## 2020-12-30 PROCEDURE — 95117 IMMUNOTHERAPY INJECTIONS: CPT | Mod: S$GLB,,, | Performed by: ALLERGY & IMMUNOLOGY

## 2020-12-30 NOTE — PROGRESS NOTES
Immunotherapy Note: Allergy Shot      Subjective:       Patient ID: Riddhi Thapa is a 50 y.o. female presents for monthly immunotherapy  Tolerated last injection  No New Medications  Beta Blockers: not on beta blocker    Reaction with last injection?: No  If female, are you pregnant?: No  Are you on any beta blockers?: No  Has the vial ?: No    Are you ill today?: No  Asthma exacerbation or symptoms: No  Do you have a fever today?: No    Peak Flow: 410    Expiration Date #1: 21  Vial Concentration: 1:1 v/v (RED)  Dose (mL) #1: 0.5 mL  Location: Right upper arm  Given By: EL    Expiration Date #2: 21  Vial Concentration: 1:1 v/v (RED)  Dose (mL) #2: 0.5 mL  Location: Left upper arm  Given By : EL         Objective:     Riddhi Thapa observed for 30 minutes and discharged in good condition        Discussion:     Pt understands the current recommendation. All questions answered to the best of my ability. Continue current management plan.      Electronically signed by Lucretia Zuluaga    Allergy/Imunology  [facility]  [place] [address]  [place] [address]   Office [phone]

## 2021-01-22 ENCOUNTER — DOCUMENTATION ONLY (OUTPATIENT)
Dept: ALLERGY | Facility: CLINIC | Age: 51
End: 2021-01-22
Payer: COMMERCIAL

## 2021-01-22 DIAGNOSIS — J30.81 ALLERGIC RHINITIS DUE TO ANIMAL HAIR AND DANDER: ICD-10-CM

## 2021-01-22 DIAGNOSIS — J30.81 ALLERGIC RHINITIS DUE TO CATS: ICD-10-CM

## 2021-01-22 PROCEDURE — 95165 PR PROFES SVC,IMMUNOTHER,SINGLE/MULT AGS: ICD-10-PCS | Mod: S$GLB,,, | Performed by: ALLERGY & IMMUNOLOGY

## 2021-01-22 PROCEDURE — 95165 ANTIGEN THERAPY SERVICES: CPT | Mod: S$GLB,,, | Performed by: ALLERGY & IMMUNOLOGY

## 2021-03-09 ENCOUNTER — IMMUNIZATION (OUTPATIENT)
Dept: PRIMARY CARE CLINIC | Facility: CLINIC | Age: 51
End: 2021-03-09

## 2021-03-09 DIAGNOSIS — Z23 NEED FOR VACCINATION: Primary | ICD-10-CM

## 2021-03-09 PROCEDURE — 91300 COVID-19, MRNA, LNP-S, PF, 30 MCG/0.3 ML DOSE VACCINE: CPT | Mod: S$GLB,,, | Performed by: FAMILY MEDICINE

## 2021-03-09 PROCEDURE — 91300 COVID-19, MRNA, LNP-S, PF, 30 MCG/0.3 ML DOSE VACCINE: ICD-10-PCS | Mod: S$GLB,,, | Performed by: FAMILY MEDICINE

## 2021-03-09 PROCEDURE — 0001A COVID-19, MRNA, LNP-S, PF, 30 MCG/0.3 ML DOSE VACCINE: ICD-10-PCS | Mod: CV19,S$GLB,, | Performed by: FAMILY MEDICINE

## 2021-03-09 PROCEDURE — 0001A COVID-19, MRNA, LNP-S, PF, 30 MCG/0.3 ML DOSE VACCINE: CPT | Mod: CV19,S$GLB,, | Performed by: FAMILY MEDICINE

## 2021-03-30 ENCOUNTER — IMMUNIZATION (OUTPATIENT)
Dept: PRIMARY CARE CLINIC | Facility: CLINIC | Age: 51
End: 2021-03-30

## 2021-03-30 DIAGNOSIS — Z23 NEED FOR VACCINATION: Primary | ICD-10-CM

## 2021-03-30 PROCEDURE — 91300 COVID-19, MRNA, LNP-S, PF, 30 MCG/0.3 ML DOSE VACCINE: ICD-10-PCS | Mod: S$GLB,,, | Performed by: FAMILY MEDICINE

## 2021-03-30 PROCEDURE — 91300 COVID-19, MRNA, LNP-S, PF, 30 MCG/0.3 ML DOSE VACCINE: CPT | Mod: S$GLB,,, | Performed by: FAMILY MEDICINE

## 2021-03-30 PROCEDURE — 0002A COVID-19, MRNA, LNP-S, PF, 30 MCG/0.3 ML DOSE VACCINE: CPT | Mod: CV19,S$GLB,, | Performed by: FAMILY MEDICINE

## 2021-03-30 PROCEDURE — 0002A COVID-19, MRNA, LNP-S, PF, 30 MCG/0.3 ML DOSE VACCINE: ICD-10-PCS | Mod: CV19,S$GLB,, | Performed by: FAMILY MEDICINE

## 2021-04-05 ENCOUNTER — CLINICAL SUPPORT (OUTPATIENT)
Dept: ALLERGY | Facility: CLINIC | Age: 51
End: 2021-04-05
Payer: COMMERCIAL

## 2021-04-05 VITALS
TEMPERATURE: 98 F | BODY MASS INDEX: 28.17 KG/M2 | WEIGHT: 165 LBS | HEART RATE: 82 BPM | HEIGHT: 64 IN | DIASTOLIC BLOOD PRESSURE: 79 MMHG | SYSTOLIC BLOOD PRESSURE: 119 MMHG

## 2021-04-05 DIAGNOSIS — J30.81 ALLERGIC RHINITIS DUE TO ANIMAL HAIR AND DANDER: ICD-10-CM

## 2021-04-05 PROCEDURE — 95117 PR IMMU2THERAPY, 2+ INJECTIONS: ICD-10-PCS | Mod: S$GLB,,, | Performed by: ALLERGY & IMMUNOLOGY

## 2021-04-05 PROCEDURE — 95117 IMMUNOTHERAPY INJECTIONS: CPT | Mod: S$GLB,,, | Performed by: ALLERGY & IMMUNOLOGY

## 2021-04-07 LAB
25(OH)D3 SERPL-MCNC: 57 NG/ML (ref 30–100)
ALBUMIN SERPL-MCNC: 4 G/DL (ref 3.6–5.1)
ALBUMIN/GLOB SERPL: 1.7 (CALC) (ref 1–2.5)
ALP SERPL-CCNC: 59 U/L (ref 31–125)
ALT SERPL-CCNC: 8 U/L (ref 6–29)
APPEARANCE UR: CLEAR
AST SERPL-CCNC: 8 U/L (ref 10–35)
BASOPHILS # BLD AUTO: 28 CELLS/UL (ref 0–200)
BASOPHILS NFR BLD AUTO: 0.4 %
BILIRUB SERPL-MCNC: 0.7 MG/DL (ref 0.2–1.2)
BILIRUB UR QL STRIP: NEGATIVE
BUN SERPL-MCNC: 11 MG/DL (ref 7–25)
BUN/CREAT SERPL: ABNORMAL (CALC) (ref 6–22)
CALCIUM SERPL-MCNC: 8.9 MG/DL (ref 8.6–10.2)
CHLORIDE SERPL-SCNC: 104 MMOL/L (ref 98–110)
CHOLEST SERPL-MCNC: 192 MG/DL
CHOLEST/HDLC SERPL: 2.4 (CALC)
CO2 SERPL-SCNC: 28 MMOL/L (ref 20–32)
COLOR UR: YELLOW
CREAT SERPL-MCNC: 0.67 MG/DL (ref 0.5–1.1)
EOSINOPHIL # BLD AUTO: 490 CELLS/UL (ref 15–500)
EOSINOPHIL NFR BLD AUTO: 6.9 %
ERYTHROCYTE [DISTWIDTH] IN BLOOD BY AUTOMATED COUNT: 12.1 % (ref 11–15)
GFRSERPLBLD MDRD-ARVRAT: 104 ML/MIN/1.73M2
GLOBULIN SER CALC-MCNC: 2.4 G/DL (CALC) (ref 1.9–3.7)
GLUCOSE SERPL-MCNC: 95 MG/DL (ref 65–99)
GLUCOSE UR QL STRIP: NEGATIVE
HCT VFR BLD AUTO: 39.1 % (ref 35–45)
HDLC SERPL-MCNC: 79 MG/DL
HGB BLD-MCNC: 13.2 G/DL (ref 11.7–15.5)
HGB UR QL STRIP: NEGATIVE
KETONES UR QL STRIP: NEGATIVE
LDLC SERPL CALC-MCNC: 94 MG/DL (CALC)
LEUKOCYTE ESTERASE UR QL STRIP: NEGATIVE
LYMPHOCYTES # BLD AUTO: 2095 CELLS/UL (ref 850–3900)
LYMPHOCYTES NFR BLD AUTO: 29.5 %
MCH RBC QN AUTO: 31.4 PG (ref 27–33)
MCHC RBC AUTO-ENTMCNC: 33.8 G/DL (ref 32–36)
MCV RBC AUTO: 93.1 FL (ref 80–100)
MONOCYTES # BLD AUTO: 426 CELLS/UL (ref 200–950)
MONOCYTES NFR BLD AUTO: 6 %
NEUTROPHILS # BLD AUTO: 4061 CELLS/UL (ref 1500–7800)
NEUTROPHILS NFR BLD AUTO: 57.2 %
NITRITE UR QL STRIP: NEGATIVE
NONHDLC SERPL-MCNC: 113 MG/DL (CALC)
PH UR STRIP: 7.5 [PH] (ref 5–8)
PLATELET # BLD AUTO: 256 THOUSAND/UL (ref 140–400)
PMV BLD REES-ECKER: 10.3 FL (ref 7.5–12.5)
POTASSIUM SERPL-SCNC: 4.2 MMOL/L (ref 3.5–5.3)
PROT SERPL-MCNC: 6.4 G/DL (ref 6.1–8.1)
PROT UR QL STRIP: NEGATIVE
RBC # BLD AUTO: 4.2 MILLION/UL (ref 3.8–5.1)
SODIUM SERPL-SCNC: 139 MMOL/L (ref 135–146)
SP GR UR STRIP: 1 (ref 1–1.03)
TRIGL SERPL-MCNC: 96 MG/DL
TSH SERPL-ACNC: 1.79 MIU/L
WBC # BLD AUTO: 7.1 THOUSAND/UL (ref 3.8–10.8)

## 2021-04-08 ENCOUNTER — OFFICE VISIT (OUTPATIENT)
Dept: FAMILY MEDICINE | Facility: CLINIC | Age: 51
End: 2021-04-08
Payer: COMMERCIAL

## 2021-04-08 VITALS
WEIGHT: 153 LBS | OXYGEN SATURATION: 98 % | BODY MASS INDEX: 26.12 KG/M2 | DIASTOLIC BLOOD PRESSURE: 78 MMHG | TEMPERATURE: 98 F | HEART RATE: 90 BPM | SYSTOLIC BLOOD PRESSURE: 112 MMHG | HEIGHT: 64 IN

## 2021-04-08 DIAGNOSIS — R11.0 NAUSEA: ICD-10-CM

## 2021-04-08 DIAGNOSIS — E55.9 VITAMIN D INSUFFICIENCY: ICD-10-CM

## 2021-04-08 DIAGNOSIS — M25.50 ARTHRALGIA, UNSPECIFIED JOINT: ICD-10-CM

## 2021-04-08 DIAGNOSIS — E78.2 MIXED HYPERLIPIDEMIA: ICD-10-CM

## 2021-04-08 DIAGNOSIS — E66.3 OVERWEIGHT (BMI 25.0-29.9): ICD-10-CM

## 2021-04-08 DIAGNOSIS — L21.9 SEBORRHEIC DERMATITIS OF SCALP: ICD-10-CM

## 2021-04-08 DIAGNOSIS — E06.3 HASHIMOTO'S THYROIDITIS: Primary | ICD-10-CM

## 2021-04-08 PROCEDURE — 1126F AMNT PAIN NOTED NONE PRSNT: CPT | Mod: S$GLB,,, | Performed by: NURSE PRACTITIONER

## 2021-04-08 PROCEDURE — 99214 OFFICE O/P EST MOD 30 MIN: CPT | Mod: S$GLB,,, | Performed by: NURSE PRACTITIONER

## 2021-04-08 PROCEDURE — 3008F BODY MASS INDEX DOCD: CPT | Mod: S$GLB,,, | Performed by: NURSE PRACTITIONER

## 2021-04-08 PROCEDURE — 3008F PR BODY MASS INDEX (BMI) DOCUMENTED: ICD-10-PCS | Mod: S$GLB,,, | Performed by: NURSE PRACTITIONER

## 2021-04-08 PROCEDURE — 1126F PR PAIN SEVERITY QUANTIFIED, NO PAIN PRESENT: ICD-10-PCS | Mod: S$GLB,,, | Performed by: NURSE PRACTITIONER

## 2021-04-08 PROCEDURE — 99214 PR OFFICE/OUTPT VISIT, EST, LEVL IV, 30-39 MIN: ICD-10-PCS | Mod: S$GLB,,, | Performed by: NURSE PRACTITIONER

## 2021-04-08 RX ORDER — ONDANSETRON 4 MG/1
4 TABLET, ORALLY DISINTEGRATING ORAL 2 TIMES DAILY
Qty: 30 TABLET | Refills: 2 | Status: SHIPPED | OUTPATIENT
Start: 2021-04-08 | End: 2022-10-11

## 2021-04-08 RX ORDER — KETOCONAZOLE 20 MG/ML
SHAMPOO, SUSPENSION TOPICAL
Qty: 120 ML | Refills: 2 | Status: SHIPPED | OUTPATIENT
Start: 2021-04-08 | End: 2022-10-11

## 2021-04-08 RX ORDER — LEVOTHYROXINE SODIUM 50 UG/1
50 TABLET ORAL
Qty: 90 TABLET | Refills: 1 | Status: SHIPPED | OUTPATIENT
Start: 2021-04-08 | End: 2021-06-14 | Stop reason: SDUPTHER

## 2021-04-08 RX ORDER — MELOXICAM 15 MG/1
15 TABLET ORAL DAILY
Qty: 30 TABLET | Refills: 2 | Status: SHIPPED | OUTPATIENT
Start: 2021-04-08 | End: 2022-10-11

## 2021-04-08 RX ORDER — LEVOMEFOLATE MAGNESIUM, LEUCOVORIN, FOLIC ACID, FERROUS CYSTEINE GLYCINATE, MAGNESIUM ASCORBATE, ZINC ASCORBATE, COCARBOXYLASE, FLAVIN ADENINE DINUCLEOTIDE, NADH, PYRIDOXAL PHOSPHATE ANHYDROUS, COBAMAMIDE, BETAINE, MAGNESIUM L-THREONATE, 1,2-DOCOSAHEXANOYL-SN-GLYCERO-3-PHOSPHOSERINE CALCIUM, 1,2-ICOSAPENTOYL-SN-GLYCERO-3-PHOSPHOSERINE CALCIUM, AND PHOSPHATIDYL SERINE 5.23; 2.5; 1; 13.6; 24; 1; 25; 25; 25; 25; 50; 500; 1; 6.4; 800; 12 MG/1; MG/1; MG/1; MG/1; MG/1; MG/1; UG/1; UG/1; UG/1; UG/1; UG/1; UG/1; MG/1; MG/1; UG/1; MG/1
CAPSULE, DELAYED RELEASE PELLETS ORAL
COMMUNITY
Start: 2021-03-12

## 2021-04-28 ENCOUNTER — CLINICAL SUPPORT (OUTPATIENT)
Dept: ALLERGY | Facility: CLINIC | Age: 51
End: 2021-04-28
Payer: COMMERCIAL

## 2021-04-28 VITALS
HEART RATE: 80 BPM | WEIGHT: 153 LBS | DIASTOLIC BLOOD PRESSURE: 85 MMHG | HEIGHT: 64 IN | BODY MASS INDEX: 26.12 KG/M2 | SYSTOLIC BLOOD PRESSURE: 124 MMHG | TEMPERATURE: 98 F

## 2021-04-28 DIAGNOSIS — J30.81 ALLERGIC RHINITIS DUE TO ANIMAL HAIR AND DANDER: ICD-10-CM

## 2021-04-28 PROCEDURE — 95117 PR IMMU2THERAPY, 2+ INJECTIONS: ICD-10-PCS | Mod: S$GLB,,, | Performed by: ALLERGY & IMMUNOLOGY

## 2021-04-28 PROCEDURE — 95117 IMMUNOTHERAPY INJECTIONS: CPT | Mod: S$GLB,,, | Performed by: ALLERGY & IMMUNOLOGY

## 2021-05-26 ENCOUNTER — CLINICAL SUPPORT (OUTPATIENT)
Dept: ALLERGY | Facility: CLINIC | Age: 51
End: 2021-05-26
Payer: COMMERCIAL

## 2021-05-26 VITALS
OXYGEN SATURATION: 98 % | HEART RATE: 100 BPM | DIASTOLIC BLOOD PRESSURE: 74 MMHG | SYSTOLIC BLOOD PRESSURE: 108 MMHG | WEIGHT: 153 LBS | HEIGHT: 64 IN | TEMPERATURE: 97 F | BODY MASS INDEX: 26.12 KG/M2

## 2021-05-26 DIAGNOSIS — J30.81 ALLERGIC RHINITIS DUE TO ANIMAL HAIR AND DANDER: ICD-10-CM

## 2021-05-26 PROCEDURE — 95117 IMMUNOTHERAPY INJECTIONS: CPT | Mod: S$GLB,,, | Performed by: ALLERGY & IMMUNOLOGY

## 2021-05-26 PROCEDURE — 95117 PR IMMU2THERAPY, 2+ INJECTIONS: ICD-10-PCS | Mod: S$GLB,,, | Performed by: ALLERGY & IMMUNOLOGY

## 2021-06-07 DIAGNOSIS — R10.13 EPIGASTRIC PAIN: Primary | ICD-10-CM

## 2021-06-08 ENCOUNTER — HOSPITAL ENCOUNTER (OUTPATIENT)
Dept: RADIOLOGY | Facility: HOSPITAL | Age: 51
Discharge: HOME | End: 2021-06-08
Attending: INTERNAL MEDICINE
Payer: COMMERCIAL

## 2021-06-08 DIAGNOSIS — R10.13 EPIGASTRIC PAIN: ICD-10-CM

## 2021-06-08 PROCEDURE — 76700 US EXAM ABDOM COMPLETE: CPT | Mod: TC,PO

## 2021-06-14 DIAGNOSIS — E06.3 HASHIMOTO'S THYROIDITIS: ICD-10-CM

## 2021-06-15 RX ORDER — LEVOTHYROXINE SODIUM 50 UG/1
50 TABLET ORAL
Qty: 90 TABLET | Refills: 1 | Status: SHIPPED | OUTPATIENT
Start: 2021-06-15 | End: 2021-10-13 | Stop reason: SDUPTHER

## 2021-06-23 ENCOUNTER — CLINICAL SUPPORT (OUTPATIENT)
Dept: ALLERGY | Facility: CLINIC | Age: 51
End: 2021-06-23
Payer: COMMERCIAL

## 2021-06-23 VITALS
WEIGHT: 153 LBS | HEIGHT: 64 IN | TEMPERATURE: 98 F | BODY MASS INDEX: 26.12 KG/M2 | HEART RATE: 82 BPM | OXYGEN SATURATION: 98 %

## 2021-06-23 DIAGNOSIS — J30.81 ALLERGIC RHINITIS DUE TO ANIMAL HAIR AND DANDER: ICD-10-CM

## 2021-06-23 PROCEDURE — 95117 PR IMMU2THERAPY, 2+ INJECTIONS: ICD-10-PCS | Mod: S$GLB,,, | Performed by: ALLERGY & IMMUNOLOGY

## 2021-06-23 PROCEDURE — 95117 IMMUNOTHERAPY INJECTIONS: CPT | Mod: S$GLB,,, | Performed by: ALLERGY & IMMUNOLOGY

## 2021-07-20 ENCOUNTER — PATIENT MESSAGE (OUTPATIENT)
Dept: FAMILY MEDICINE | Facility: CLINIC | Age: 51
End: 2021-07-20

## 2021-07-20 ENCOUNTER — OFFICE VISIT (OUTPATIENT)
Dept: FAMILY MEDICINE | Facility: CLINIC | Age: 51
End: 2021-07-20
Payer: COMMERCIAL

## 2021-07-20 VITALS
SYSTOLIC BLOOD PRESSURE: 120 MMHG | BODY MASS INDEX: 26.63 KG/M2 | DIASTOLIC BLOOD PRESSURE: 78 MMHG | HEIGHT: 64 IN | OXYGEN SATURATION: 99 % | HEART RATE: 86 BPM | TEMPERATURE: 98 F | WEIGHT: 156 LBS

## 2021-07-20 DIAGNOSIS — R30.0 DYSURIA: Primary | ICD-10-CM

## 2021-07-20 DIAGNOSIS — N30.00 ACUTE CYSTITIS WITHOUT HEMATURIA: ICD-10-CM

## 2021-07-20 DIAGNOSIS — E66.3 OVERWEIGHT (BMI 25.0-29.9): ICD-10-CM

## 2021-07-20 LAB
BILIRUB UR QL STRIP: NEGATIVE
CLARITY UR: ABNORMAL
COLOR UR: YELLOW
GLUCOSE UR QL STRIP: NEGATIVE
KETONES UR QL STRIP: NEGATIVE
LEUKOCYTE ESTERASE UR QL STRIP: POSITIVE
PH, POC UA: 6.5 (ref 5–8.5)
POC BLOOD, URINE: NEGATIVE
POC NITRATES, URINE: NEGATIVE
PROT UR QL STRIP: NEGATIVE
SP GR UR STRIP: 1.01 (ref 1–1.03)
UROBILINOGEN UR STRIP-ACNC: ABNORMAL (ref 0.2–8)

## 2021-07-20 PROCEDURE — 87077 CULTURE AEROBIC IDENTIFY: CPT | Performed by: NURSE PRACTITIONER

## 2021-07-20 PROCEDURE — 99213 PR OFFICE/OUTPT VISIT, EST, LEVL III, 20-29 MIN: ICD-10-PCS | Mod: 25,S$GLB,, | Performed by: NURSE PRACTITIONER

## 2021-07-20 PROCEDURE — 87086 URINE CULTURE/COLONY COUNT: CPT | Performed by: NURSE PRACTITIONER

## 2021-07-20 PROCEDURE — 81003 POCT URINALYSIS, DIPSTICK, AUTOMATED, W/O SCOPE: ICD-10-PCS | Mod: QW,S$GLB,, | Performed by: NURSE PRACTITIONER

## 2021-07-20 PROCEDURE — 81003 URINALYSIS AUTO W/O SCOPE: CPT | Mod: QW,S$GLB,, | Performed by: NURSE PRACTITIONER

## 2021-07-20 PROCEDURE — 3008F PR BODY MASS INDEX (BMI) DOCUMENTED: ICD-10-PCS | Mod: S$GLB,,, | Performed by: NURSE PRACTITIONER

## 2021-07-20 PROCEDURE — 3008F BODY MASS INDEX DOCD: CPT | Mod: S$GLB,,, | Performed by: NURSE PRACTITIONER

## 2021-07-20 PROCEDURE — 99213 OFFICE O/P EST LOW 20 MIN: CPT | Mod: 25,S$GLB,, | Performed by: NURSE PRACTITIONER

## 2021-07-20 PROCEDURE — 1125F PR PAIN SEVERITY QUANTIFIED, PAIN PRESENT: ICD-10-PCS | Mod: S$GLB,,, | Performed by: NURSE PRACTITIONER

## 2021-07-20 PROCEDURE — 87186 SC STD MICRODIL/AGAR DIL: CPT | Performed by: NURSE PRACTITIONER

## 2021-07-20 PROCEDURE — 1125F AMNT PAIN NOTED PAIN PRSNT: CPT | Mod: S$GLB,,, | Performed by: NURSE PRACTITIONER

## 2021-07-20 RX ORDER — SUCRALFATE 1 G/1
TABLET ORAL
COMMUNITY
Start: 2021-06-07 | End: 2022-10-11

## 2021-07-20 RX ORDER — NITROFURANTOIN 25; 75 MG/1; MG/1
100 CAPSULE ORAL 2 TIMES DAILY
Qty: 14 CAPSULE | Refills: 0 | Status: SHIPPED | OUTPATIENT
Start: 2021-07-20 | End: 2021-07-27

## 2021-07-21 ENCOUNTER — CLINICAL SUPPORT (OUTPATIENT)
Dept: ALLERGY | Facility: CLINIC | Age: 51
End: 2021-07-21
Payer: COMMERCIAL

## 2021-07-21 VITALS
WEIGHT: 156 LBS | HEIGHT: 64 IN | DIASTOLIC BLOOD PRESSURE: 79 MMHG | SYSTOLIC BLOOD PRESSURE: 119 MMHG | BODY MASS INDEX: 26.63 KG/M2 | HEART RATE: 79 BPM | TEMPERATURE: 98 F

## 2021-07-21 DIAGNOSIS — J30.81 ALLERGIC RHINITIS DUE TO ANIMAL HAIR AND DANDER: ICD-10-CM

## 2021-07-21 PROCEDURE — 95117 PR IMMU2THERAPY, 2+ INJECTIONS: ICD-10-PCS | Mod: S$GLB,,, | Performed by: ALLERGY & IMMUNOLOGY

## 2021-07-21 PROCEDURE — 95117 IMMUNOTHERAPY INJECTIONS: CPT | Mod: S$GLB,,, | Performed by: ALLERGY & IMMUNOLOGY

## 2021-07-22 LAB — BACTERIA UR CULT: ABNORMAL

## 2021-08-19 ENCOUNTER — CLINICAL SUPPORT (OUTPATIENT)
Dept: ALLERGY | Facility: CLINIC | Age: 51
End: 2021-08-19
Payer: COMMERCIAL

## 2021-08-19 VITALS
WEIGHT: 156 LBS | HEART RATE: 86 BPM | BODY MASS INDEX: 26.63 KG/M2 | DIASTOLIC BLOOD PRESSURE: 82 MMHG | HEIGHT: 64 IN | OXYGEN SATURATION: 99 % | TEMPERATURE: 98 F | SYSTOLIC BLOOD PRESSURE: 123 MMHG

## 2021-08-19 DIAGNOSIS — J45.40 MODERATE PERSISTENT EXTRINSIC ASTHMA WITHOUT COMPLICATION: ICD-10-CM

## 2021-08-19 PROCEDURE — 95117 IMMUNOTHERAPY INJECTIONS: CPT | Mod: S$GLB,,, | Performed by: ALLERGY & IMMUNOLOGY

## 2021-08-19 PROCEDURE — 95117 PR IMMU2THERAPY, 2+ INJECTIONS: ICD-10-PCS | Mod: S$GLB,,, | Performed by: ALLERGY & IMMUNOLOGY

## 2021-09-16 ENCOUNTER — CLINICAL SUPPORT (OUTPATIENT)
Dept: ALLERGY | Facility: CLINIC | Age: 51
End: 2021-09-16
Payer: COMMERCIAL

## 2021-09-16 VITALS
HEIGHT: 64 IN | HEART RATE: 85 BPM | OXYGEN SATURATION: 99 % | DIASTOLIC BLOOD PRESSURE: 84 MMHG | SYSTOLIC BLOOD PRESSURE: 120 MMHG | BODY MASS INDEX: 26.46 KG/M2 | WEIGHT: 155 LBS | TEMPERATURE: 98 F

## 2021-09-16 DIAGNOSIS — J30.81 ALLERGIC RHINITIS DUE TO CATS: ICD-10-CM

## 2021-09-16 DIAGNOSIS — J30.81 ALLERGIC RHINITIS DUE TO ANIMAL HAIR AND DANDER: ICD-10-CM

## 2021-09-16 PROCEDURE — 95117 IMMUNOTHERAPY INJECTIONS: CPT | Mod: S$GLB,,, | Performed by: ALLERGY & IMMUNOLOGY

## 2021-09-16 PROCEDURE — 95117 PR IMMU2THERAPY, 2+ INJECTIONS: ICD-10-PCS | Mod: S$GLB,,, | Performed by: ALLERGY & IMMUNOLOGY

## 2021-10-06 ENCOUNTER — TELEPHONE (OUTPATIENT)
Dept: FAMILY MEDICINE | Facility: CLINIC | Age: 51
End: 2021-10-06

## 2021-10-06 DIAGNOSIS — E55.9 VITAMIN D INSUFFICIENCY: ICD-10-CM

## 2021-10-06 DIAGNOSIS — E78.2 MIXED HYPERLIPIDEMIA: ICD-10-CM

## 2021-10-06 DIAGNOSIS — F41.8 MIXED ANXIETY DEPRESSIVE DISORDER: ICD-10-CM

## 2021-10-06 DIAGNOSIS — E06.3 HASHIMOTO'S THYROIDITIS: Primary | ICD-10-CM

## 2021-10-12 LAB
25(OH)D3 SERPL-MCNC: 50 NG/ML (ref 30–100)
ALBUMIN SERPL-MCNC: 3.9 G/DL (ref 3.6–5.1)
ALBUMIN/GLOB SERPL: 1.9 (CALC) (ref 1–2.5)
ALP SERPL-CCNC: 52 U/L (ref 31–125)
ALT SERPL-CCNC: 9 U/L (ref 6–29)
APPEARANCE UR: CLEAR
AST SERPL-CCNC: 10 U/L (ref 10–35)
BASOPHILS # BLD AUTO: 19 CELLS/UL (ref 0–200)
BASOPHILS NFR BLD AUTO: 0.3 %
BILIRUB SERPL-MCNC: 0.7 MG/DL (ref 0.2–1.2)
BILIRUB UR QL STRIP: NEGATIVE
BUN SERPL-MCNC: 10 MG/DL (ref 7–25)
BUN/CREAT SERPL: ABNORMAL (CALC) (ref 6–22)
CALCIUM SERPL-MCNC: 8.6 MG/DL (ref 8.6–10.2)
CHLORIDE SERPL-SCNC: 104 MMOL/L (ref 98–110)
CHOLEST SERPL-MCNC: 155 MG/DL
CHOLEST/HDLC SERPL: 2.2 (CALC)
CO2 SERPL-SCNC: 28 MMOL/L (ref 20–32)
COLOR UR: YELLOW
CREAT SERPL-MCNC: 0.69 MG/DL (ref 0.5–1.1)
EOSINOPHIL # BLD AUTO: 467 CELLS/UL (ref 15–500)
EOSINOPHIL NFR BLD AUTO: 7.3 %
ERYTHROCYTE [DISTWIDTH] IN BLOOD BY AUTOMATED COUNT: 12.1 % (ref 11–15)
GLOBULIN SER CALC-MCNC: 2.1 G/DL (CALC) (ref 1.9–3.7)
GLUCOSE SERPL-MCNC: 93 MG/DL (ref 65–99)
GLUCOSE UR QL STRIP: NEGATIVE
HCT VFR BLD AUTO: 37.4 % (ref 35–45)
HDLC SERPL-MCNC: 71 MG/DL
HGB BLD-MCNC: 12.3 G/DL (ref 11.7–15.5)
HGB UR QL STRIP: NEGATIVE
KETONES UR QL STRIP: NEGATIVE
LDLC SERPL CALC-MCNC: 64 MG/DL (CALC)
LEUKOCYTE ESTERASE UR QL STRIP: NEGATIVE
LYMPHOCYTES # BLD AUTO: 2061 CELLS/UL (ref 850–3900)
LYMPHOCYTES NFR BLD AUTO: 32.2 %
MCH RBC QN AUTO: 32.1 PG (ref 27–33)
MCHC RBC AUTO-ENTMCNC: 32.9 G/DL (ref 32–36)
MCV RBC AUTO: 97.7 FL (ref 80–100)
MONOCYTES # BLD AUTO: 480 CELLS/UL (ref 200–950)
MONOCYTES NFR BLD AUTO: 7.5 %
NEUTROPHILS # BLD AUTO: 3373 CELLS/UL (ref 1500–7800)
NEUTROPHILS NFR BLD AUTO: 52.7 %
NITRITE UR QL STRIP: NEGATIVE
NONHDLC SERPL-MCNC: 84 MG/DL (CALC)
PH UR STRIP: 6.5 [PH] (ref 5–8)
PLATELET # BLD AUTO: 235 THOUSAND/UL (ref 140–400)
PMV BLD REES-ECKER: 10.7 FL (ref 7.5–12.5)
POTASSIUM SERPL-SCNC: 3.7 MMOL/L (ref 3.5–5.3)
PROT SERPL-MCNC: 6 G/DL (ref 6.1–8.1)
PROT UR QL STRIP: NEGATIVE
RBC # BLD AUTO: 3.83 MILLION/UL (ref 3.8–5.1)
SODIUM SERPL-SCNC: 139 MMOL/L (ref 135–146)
SP GR UR STRIP: 1.02 (ref 1–1.03)
T4 FREE SERPL-MCNC: 1.4 NG/DL (ref 0.8–1.8)
TRIGL SERPL-MCNC: 117 MG/DL
TSH SERPL-ACNC: 1.48 MIU/L
WBC # BLD AUTO: 6.4 THOUSAND/UL (ref 3.8–10.8)

## 2021-10-13 ENCOUNTER — OFFICE VISIT (OUTPATIENT)
Dept: FAMILY MEDICINE | Facility: CLINIC | Age: 51
End: 2021-10-13
Payer: COMMERCIAL

## 2021-10-13 VITALS
BODY MASS INDEX: 27.14 KG/M2 | OXYGEN SATURATION: 99 % | SYSTOLIC BLOOD PRESSURE: 130 MMHG | HEIGHT: 64 IN | WEIGHT: 159 LBS | TEMPERATURE: 98 F | HEART RATE: 75 BPM | DIASTOLIC BLOOD PRESSURE: 70 MMHG

## 2021-10-13 DIAGNOSIS — E55.9 VITAMIN D INSUFFICIENCY: ICD-10-CM

## 2021-10-13 DIAGNOSIS — Z23 NEED FOR INFLUENZA VACCINATION: ICD-10-CM

## 2021-10-13 DIAGNOSIS — E66.3 OVERWEIGHT (BMI 25.0-29.9): ICD-10-CM

## 2021-10-13 DIAGNOSIS — E78.2 MIXED HYPERLIPIDEMIA: ICD-10-CM

## 2021-10-13 DIAGNOSIS — E06.3 HASHIMOTO'S THYROIDITIS: Primary | ICD-10-CM

## 2021-10-13 PROCEDURE — 3008F PR BODY MASS INDEX (BMI) DOCUMENTED: ICD-10-PCS | Mod: S$GLB,,, | Performed by: NURSE PRACTITIONER

## 2021-10-13 PROCEDURE — 90471 IMMUNIZATION ADMIN: CPT | Mod: S$GLB,,, | Performed by: NURSE PRACTITIONER

## 2021-10-13 PROCEDURE — 90686 IIV4 VACC NO PRSV 0.5 ML IM: CPT | Mod: S$GLB,,, | Performed by: NURSE PRACTITIONER

## 2021-10-13 PROCEDURE — 1160F PR REVIEW ALL MEDS BY PRESCRIBER/CLIN PHARMACIST DOCUMENTED: ICD-10-PCS | Mod: S$GLB,,, | Performed by: NURSE PRACTITIONER

## 2021-10-13 PROCEDURE — 90686 FLU VACCINE (QUAD) GREATER THAN OR EQUAL TO 3YO PRESERVATIVE FREE IM: ICD-10-PCS | Mod: S$GLB,,, | Performed by: NURSE PRACTITIONER

## 2021-10-13 PROCEDURE — 3008F BODY MASS INDEX DOCD: CPT | Mod: S$GLB,,, | Performed by: NURSE PRACTITIONER

## 2021-10-13 PROCEDURE — 90471 FLU VACCINE (QUAD) GREATER THAN OR EQUAL TO 3YO PRESERVATIVE FREE IM: ICD-10-PCS | Mod: S$GLB,,, | Performed by: NURSE PRACTITIONER

## 2021-10-13 PROCEDURE — 99214 OFFICE O/P EST MOD 30 MIN: CPT | Mod: 25,S$GLB,, | Performed by: NURSE PRACTITIONER

## 2021-10-13 PROCEDURE — 1160F RVW MEDS BY RX/DR IN RCRD: CPT | Mod: S$GLB,,, | Performed by: NURSE PRACTITIONER

## 2021-10-13 PROCEDURE — 99214 PR OFFICE/OUTPT VISIT, EST, LEVL IV, 30-39 MIN: ICD-10-PCS | Mod: 25,S$GLB,, | Performed by: NURSE PRACTITIONER

## 2021-10-13 RX ORDER — ACETAMINOPHEN 500 MG
1 TABLET ORAL DAILY
COMMUNITY

## 2021-10-13 RX ORDER — LEVOTHYROXINE SODIUM 50 UG/1
50 TABLET ORAL
Qty: 90 TABLET | Refills: 1 | Status: SHIPPED | OUTPATIENT
Start: 2021-10-13 | End: 2022-04-19 | Stop reason: SDUPTHER

## 2021-10-28 ENCOUNTER — CLINICAL SUPPORT (OUTPATIENT)
Dept: ALLERGY | Facility: CLINIC | Age: 51
End: 2021-10-28
Payer: COMMERCIAL

## 2021-10-28 VITALS
SYSTOLIC BLOOD PRESSURE: 126 MMHG | HEIGHT: 64 IN | HEART RATE: 99 BPM | DIASTOLIC BLOOD PRESSURE: 85 MMHG | WEIGHT: 159 LBS | BODY MASS INDEX: 27.14 KG/M2

## 2021-10-28 DIAGNOSIS — J30.81 ALLERGIC RHINITIS DUE TO CATS: ICD-10-CM

## 2021-10-28 DIAGNOSIS — J30.81 ALLERGIC RHINITIS DUE TO ANIMAL HAIR AND DANDER: ICD-10-CM

## 2021-10-28 PROCEDURE — 95117 IMMUNOTHERAPY INJECTIONS: CPT | Mod: S$GLB,,, | Performed by: ALLERGY & IMMUNOLOGY

## 2021-10-28 PROCEDURE — 95117 PR IMMU2THERAPY, 2+ INJECTIONS: ICD-10-PCS | Mod: S$GLB,,, | Performed by: ALLERGY & IMMUNOLOGY

## 2021-11-24 ENCOUNTER — CLINICAL SUPPORT (OUTPATIENT)
Dept: ALLERGY | Facility: CLINIC | Age: 51
End: 2021-11-24
Payer: COMMERCIAL

## 2021-11-24 VITALS
SYSTOLIC BLOOD PRESSURE: 126 MMHG | HEART RATE: 85 BPM | OXYGEN SATURATION: 99 % | HEIGHT: 64 IN | BODY MASS INDEX: 27.14 KG/M2 | TEMPERATURE: 98 F | DIASTOLIC BLOOD PRESSURE: 85 MMHG | WEIGHT: 159 LBS

## 2021-11-24 DIAGNOSIS — J30.1 SEASONAL ALLERGIC RHINITIS DUE TO POLLEN: ICD-10-CM

## 2021-11-24 DIAGNOSIS — J30.81 ALLERGIC RHINITIS DUE TO ANIMAL HAIR AND DANDER: ICD-10-CM

## 2021-11-24 PROCEDURE — 95117 IMMUNOTHERAPY INJECTIONS: CPT | Mod: S$GLB,,, | Performed by: ALLERGY & IMMUNOLOGY

## 2021-11-24 PROCEDURE — 95117 PR IMMU2THERAPY, 2+ INJECTIONS: ICD-10-PCS | Mod: S$GLB,,, | Performed by: ALLERGY & IMMUNOLOGY

## 2021-12-21 ENCOUNTER — CLINICAL SUPPORT (OUTPATIENT)
Dept: ALLERGY | Facility: CLINIC | Age: 51
End: 2021-12-21
Payer: COMMERCIAL

## 2021-12-21 VITALS
HEART RATE: 90 BPM | TEMPERATURE: 97 F | BODY MASS INDEX: 27.14 KG/M2 | SYSTOLIC BLOOD PRESSURE: 130 MMHG | OXYGEN SATURATION: 100 % | HEIGHT: 64 IN | WEIGHT: 159 LBS | DIASTOLIC BLOOD PRESSURE: 81 MMHG

## 2021-12-21 DIAGNOSIS — J30.81 ALLERGIC RHINITIS DUE TO ANIMAL HAIR AND DANDER: ICD-10-CM

## 2021-12-21 DIAGNOSIS — J30.81 ALLERGIC RHINITIS DUE TO CATS: ICD-10-CM

## 2021-12-21 PROCEDURE — 95117 PR IMMU2THERAPY, 2+ INJECTIONS: ICD-10-PCS | Mod: S$GLB,,, | Performed by: ALLERGY & IMMUNOLOGY

## 2021-12-21 PROCEDURE — 95117 IMMUNOTHERAPY INJECTIONS: CPT | Mod: S$GLB,,, | Performed by: ALLERGY & IMMUNOLOGY

## 2022-01-07 ENCOUNTER — DOCUMENTATION ONLY (OUTPATIENT)
Dept: ALLERGY | Facility: CLINIC | Age: 52
End: 2022-01-07
Payer: COMMERCIAL

## 2022-01-07 DIAGNOSIS — J30.81 ALLERGIC RHINITIS DUE TO ANIMAL HAIR AND DANDER: ICD-10-CM

## 2022-01-07 DIAGNOSIS — J30.1 SEASONAL ALLERGIC RHINITIS DUE TO POLLEN: Primary | ICD-10-CM

## 2022-01-07 PROCEDURE — 95165 ANTIGEN THERAPY SERVICES: CPT | Mod: S$GLB,,, | Performed by: ALLERGY & IMMUNOLOGY

## 2022-01-07 PROCEDURE — 95165 PR PROFES SVC,IMMUNOTHER,SINGLE/MULT AGS: ICD-10-PCS | Mod: S$GLB,,, | Performed by: ALLERGY & IMMUNOLOGY

## 2022-01-07 NOTE — PROGRESS NOTES
Using 70% alcohol clean surface was prepped. Sterile drape then covered clean space.  Hands were scrubbed and placed into sterile gloves. Gown, mask, and hair net worn according to  guidelines allergist exemption.    Remix vial 1 and 2     DOS 1-7-2022    20 doses

## 2022-01-11 ENCOUNTER — PATIENT MESSAGE (OUTPATIENT)
Dept: ADMINISTRATIVE | Facility: OTHER | Age: 52
End: 2022-01-11

## 2022-01-12 ENCOUNTER — OFFICE VISIT (OUTPATIENT)
Dept: ALLERGY | Facility: CLINIC | Age: 52
End: 2022-01-12
Payer: COMMERCIAL

## 2022-01-12 ENCOUNTER — PATIENT MESSAGE (OUTPATIENT)
Dept: FAMILY MEDICINE | Facility: CLINIC | Age: 52
End: 2022-01-12
Payer: COMMERCIAL

## 2022-01-12 DIAGNOSIS — J45.40 MODERATE PERSISTENT EXTRINSIC ASTHMA WITHOUT COMPLICATION: Primary | ICD-10-CM

## 2022-01-12 DIAGNOSIS — J30.81 ALLERGIC RHINITIS DUE TO ANIMAL HAIR AND DANDER: ICD-10-CM

## 2022-01-12 DIAGNOSIS — Z71.89 COUNSELED ABOUT COVID-19 VIRUS INFECTION: ICD-10-CM

## 2022-01-12 PROCEDURE — 99215 PR OFFICE/OUTPT VISIT, EST, LEVL V, 40-54 MIN: ICD-10-PCS | Mod: 95,,, | Performed by: ALLERGY & IMMUNOLOGY

## 2022-01-12 PROCEDURE — 99215 OFFICE O/P EST HI 40 MIN: CPT | Mod: 95,,, | Performed by: ALLERGY & IMMUNOLOGY

## 2022-01-12 PROCEDURE — 1160F PR REVIEW ALL MEDS BY PRESCRIBER/CLIN PHARMACIST DOCUMENTED: ICD-10-PCS | Mod: 95,,, | Performed by: ALLERGY & IMMUNOLOGY

## 2022-01-12 PROCEDURE — 1160F RVW MEDS BY RX/DR IN RCRD: CPT | Mod: 95,,, | Performed by: ALLERGY & IMMUNOLOGY

## 2022-01-12 RX ORDER — TIOTROPIUM BROMIDE INHALATION SPRAY 1.56 UG/1
2 SPRAY, METERED RESPIRATORY (INHALATION) DAILY
Qty: 4 G | Refills: 3 | Status: SHIPPED | OUTPATIENT
Start: 2022-01-12 | End: 2022-10-11

## 2022-01-12 NOTE — LETTER
January 12, 2022      [facility] - Allergy  [address]  [address]  [facility] [place] [address]  Phone: [phone]  Fax: [phone]       Patient: Riddhi Thapa   YOB: 1970  Date of Visit: 01/12/2022    To Whom It May Concern:    Joshua Thapa  was at [facility] on 01/12/2022. The patient may return to work/school on 1/18/2022 with no restrictions. If you have any questions or concerns, or if I can be of further assistance, please do not hesitate to contact me.    Sincerely,    SAJI Fagan M.D.  Allergy & Immunology  [address]  [place]  [address]  Phone: [phone]  Fax: [phone]

## 2022-01-12 NOTE — PROGRESS NOTES
"Subjective:       Patient ID: Riddhi Thapa is a 51 y.o. female.    Chief Complaint: suspected covid    HPI     Pt presents for allergic rhinitis and asthma      Last visit 7/2020    Since her last visit,     1/22  - Monday sinus drip increased.   Difficult sleeping, pseudophed taken.   Next day, or yesterday, felt tired but felt "sinus stuff"  As the day progressed, she felt like her chest felt tight.   She felt dizzy.   She states she didn't feel well.   She then started with chills and fatigue and achyness.   Sore throat.   Having nausea without emesis as well.     covid test today- negative rapid.   But symptoms are classic    Peak flow 300 this am and typically 410.         Asthma:  Condition: exacerbated    Sx: tightness , cough chest tightness   Sx frequ:< 2 days per week   Jacqueline frequ: < 2 days per week   Controller: symbicort 160 2 BID, montelukast 10 mg po qday    Oral steroids: none for breathing , medrol for sciatica.     Rhinitis:  Allergic   On AIT   [place] exacerbation.   Tx: off nasal sprays and saline , pseudophed. Montelukast 10 mg po qday. - no black box changes   Sx: PND     With suspected covid 1/22 exacerbated.     Review of Systems      General: neg unexpected weight changes, fevers, chills, night sweats, malaise  HEENT: see hpi, Neg eye pain, vision changes, ear drainage, nose bleeds, throat tightness, sores in the mouth  CV: Neg chest pain, palpitations, swelling  Resp: see hpi, neg hemoptysis.  GI: see hpi, neg dysphagia, night abdominal pain, reflux, chronic diarrhea, chronic constipation  Derm: See Hpi, neg new rash, neg flushing  Mu/sk: Neg joint pain, joint swelling   Psych: Neg anxiety  neuro: neg chronic headaches, muscle weakness  Endo: neg heat/cold intolerance, chronic fatigue    Objective:     There were no vitals filed for this visit.     Physical Exam      General: no acute distress, well developed well nourished     Assessment:       1. Moderate persistent extrinsic " asthma without complication    2. Allergic rhinitis due to animal hair and dander    3. Counseled about COVID-19 virus infection        Plan:       Moderate persistent extrinsic asthma without complication  -     tiotropium bromide (SPIRIVA RESPIMAT) 1.25 mcg/actuation inhaler; Inhale 2 puffs into the lungs once daily. Controller  Dispense: 4 g; Refill: 3    Allergic rhinitis due to animal hair and dander    Counseled about COVID-19 virus infection             supected covid and counselled 40 mins time face to face     Sinus rinses   Do those twice per day.    Then vaseline the nose with a q tip    Breathing:  Get a pulse ox   If the pox drops lower than 90 you need emergent oxygen.     Let it sit on the finger 30 seconds. Get a good wave form.     symbicort 160 mcg 1 puff every 6 hours   Or 2 puffs twice per day .     spiriva 2 puffs once per day or 1 puff twice per day. - for mucus plugging.     Albuterol - interweave albuterol every 2-4 hours as needed.   Use as many puffs it takes to feel better whether that is 4 puffs, 6 puffs, or 10 puffs.     zofran for nausea 4 mg as needed every 4-6 hours.     pedialyte - will help hydrate.       Continue peak flow monitoring.     ID recs:   Zinc 220 mg daily   Vitamin C 500 mg daily or twice per day   Vitamin D 5000 IU daily     Throat zinc losenges     BudesonideSilicon [employer]    Let me know if you need dexamethasone that means the pulse ox is dropping to low 90's.      Achyness/chills:     rotate tylenol 500 mg and motrin 600 mg with food every 3-4 hours.      Take the dexilant 60 mg every morning.     Consider staying active.     Consider aspirin 81 mg for clot risk.       Plan to be out of work 7-10 days.     Get flu shot yearly     The patient location is: home  The chief complaint leading to consultation is: breathiing and sinus complaints     Visit type: audiovisual    Face to Face time with patient: 40  45 minutes of total time spent on the encounter, which includes face  to face time and non-face to face time preparing to see the patient (eg, review of tests), Obtaining and/or reviewing separately obtained history, Documenting clinical information in the electronic or other health record, Independently interpreting results (not separately reported) and communicating results to the patient/family/caregiver, or Care coordination (not separately reported).         Each patient to whom he or she provides medical services by telemedicine is:  (1) informed of the relationship between the physician and patient and the respective role of any other health care provider with respect to management of the patient; and (2) notified that he or she may decline to receive medical services by telemedicine and may withdraw from such care at any time.      F/u in 6 weeks if breathing is not better.           Samara Jones M.D.  Allergy/Immunology  [facility]   [phone] phone  [phone] fax

## 2022-01-12 NOTE — PATIENT INSTRUCTIONS
Sinus rinses   Do those twice per day.    Then vaseline the nose with a q tip    Breathing:  Get a pulse ox   If the pox drops lower than 90 you need emergent oxygen.     Let it sit on the finger 30 seconds. Get a good wave form.     symbicort 160 mcg 1 puff every 6 hours   Or 2 puffs twice per day .     spiriva 2 puffs once per day or 1 puff twice per day. - for mucus plugging.     Albuterol - interweave albuterol every 2-4 hours as needed.   Use as many puffs it takes to feel better whether that is 4 puffs, 6 puffs, or 10 puffs.     zofran for nausea 4 mg as needed every 4-6 hours.     pedialyte - will help hydrate.       Continue peak flow monitoring.     ID recs:   Zinc 220 mg daily   Vitamin C 500 mg daily or twice per day   Vitamin D 5000 IU daily     Throat zinc losenges     [email or web address]    Let me know if you need dexamethasone that means the pulse ox is dropping to low 90's.      Achyness/chills:     rotate tylenol 500 mg and motrin 600 mg with food every 3-4 hours.      Take the dexilant 60 mg every morning.     Consider staying active.     Consider aspirin 81 mg for clot risk.       Plan to be out of work 7-10 days.

## 2022-02-23 DIAGNOSIS — D84.9 IMMUNOSUPPRESSED STATUS: ICD-10-CM

## 2022-02-28 ENCOUNTER — CLINICAL SUPPORT (OUTPATIENT)
Dept: ALLERGY | Facility: CLINIC | Age: 52
End: 2022-02-28
Payer: COMMERCIAL

## 2022-02-28 VITALS — HEIGHT: 64 IN | OXYGEN SATURATION: 98 % | WEIGHT: 159 LBS | BODY MASS INDEX: 27.14 KG/M2 | HEART RATE: 95 BPM

## 2022-02-28 DIAGNOSIS — J30.81 ALLERGIC RHINITIS DUE TO ANIMAL HAIR AND DANDER: ICD-10-CM

## 2022-02-28 DIAGNOSIS — J30.81 ALLERGIC RHINITIS DUE TO CATS: ICD-10-CM

## 2022-02-28 PROCEDURE — 95117 PR IMMU2THERAPY, 2+ INJECTIONS: ICD-10-PCS | Mod: S$GLB,,, | Performed by: ALLERGY & IMMUNOLOGY

## 2022-02-28 PROCEDURE — 95117 IMMUNOTHERAPY INJECTIONS: CPT | Mod: S$GLB,,, | Performed by: ALLERGY & IMMUNOLOGY

## 2022-02-28 NOTE — PROGRESS NOTES
Immunotherapy Note: Allergy Shot      Subjective:       Patient ID: Riddhi Thapa is a 51 y.o. female presents for monthly immunotherapy  Tolerated last injection  No New Medications  Beta Blockers: not on beta blocker    Reaction with last injection?: No  If female, are you pregnant?: No  Are you on any beta blockers?: No  Has the vial ?: No    Are you ill today?: No  Asthma exacerbation or symptoms: No  Do you have a fever today?: No    Peak Flow: 380    Expiration Date #1: 23  Vial Concentration: 1:1 v/v (RED)  Dose (mL) #1: 0.4 mL  Location: Right upper arm  Given By: EL    Expiration Date #2: 23  Vial Concentration: 1:1 v/v (RED)  Dose (mL) #2: 0.4 mL  Location: Left upper arm  Given By : EL         Objective:     Riddhi Thapa observed for 30 minutes and discharged in good condition        Discussion:     Pt understands the current recommendation. All questions answered to the best of my ability. Continue current management plan.      Electronically signed by Lucretia Zuluaga    Allergy/Imunology  [facility]  [place] [address]  [place] [address]   Office [phone]

## 2022-03-28 ENCOUNTER — CLINICAL SUPPORT (OUTPATIENT)
Dept: ALLERGY | Facility: CLINIC | Age: 52
End: 2022-03-28
Payer: COMMERCIAL

## 2022-03-28 VITALS
HEART RATE: 86 BPM | HEIGHT: 64 IN | WEIGHT: 159 LBS | DIASTOLIC BLOOD PRESSURE: 85 MMHG | OXYGEN SATURATION: 98 % | BODY MASS INDEX: 27.14 KG/M2 | SYSTOLIC BLOOD PRESSURE: 128 MMHG

## 2022-03-28 DIAGNOSIS — J30.81 ALLERGIC RHINITIS DUE TO CATS: ICD-10-CM

## 2022-03-28 DIAGNOSIS — J30.81 ALLERGIC RHINITIS DUE TO ANIMAL HAIR AND DANDER: ICD-10-CM

## 2022-03-28 PROCEDURE — 95117 PR IMMU2THERAPY, 2+ INJECTIONS: ICD-10-PCS | Mod: S$GLB,,, | Performed by: ALLERGY & IMMUNOLOGY

## 2022-03-28 PROCEDURE — 95117 IMMUNOTHERAPY INJECTIONS: CPT | Mod: S$GLB,,, | Performed by: ALLERGY & IMMUNOLOGY

## 2022-03-28 NOTE — PROGRESS NOTES
Immunotherapy Note: Allergy Shot      Subjective:       Patient ID: Riddhi Thapa is a 51 y.o. female presents for monthly immunotherapy  Tolerated last injection  No New Medications  Beta Blockers: not on beta blocker    Reaction with last injection?: No  If female, are you pregnant?: No  Are you on any beta blockers?: No  Has the vial ?: No    Are you ill today?: No  Asthma exacerbation or symptoms: No  Do you have a fever today?: No    Peak Flow: 400    Expiration Date #1: 23  Vial Concentration: 1:1 v/v (RED)  Dose (mL) #1: 0.5 mL  Location: Right upper arm  Given By: EL    Expiration Date #2: 23  Vial Concentration: 1:1 v/v (RED)  Dose (mL) #2: 0.5 mL  Location: Left upper arm  Given By : EL         Objective:     Riddhi Thapa observed for 30 minutes and discharged in good condition        Discussion:     Pt understands the current recommendation. All questions answered to the best of my ability. Continue current management plan.      Electronically signed by Lucretia Zuluaga    Allergy/Imunology  [facility]  [place] [address]  [place] [address]   Office [phone]

## 2022-04-19 ENCOUNTER — OFFICE VISIT (OUTPATIENT)
Dept: FAMILY MEDICINE | Facility: CLINIC | Age: 52
End: 2022-04-19
Payer: COMMERCIAL

## 2022-04-19 VITALS
OXYGEN SATURATION: 98 % | HEIGHT: 64 IN | TEMPERATURE: 98 F | HEART RATE: 74 BPM | WEIGHT: 161 LBS | SYSTOLIC BLOOD PRESSURE: 128 MMHG | DIASTOLIC BLOOD PRESSURE: 80 MMHG | BODY MASS INDEX: 27.49 KG/M2

## 2022-04-19 DIAGNOSIS — F41.8 MIXED ANXIETY DEPRESSIVE DISORDER: ICD-10-CM

## 2022-04-19 DIAGNOSIS — E66.3 OVERWEIGHT (BMI 25.0-29.9): ICD-10-CM

## 2022-04-19 DIAGNOSIS — K21.9 GASTROESOPHAGEAL REFLUX DISEASE WITHOUT ESOPHAGITIS: ICD-10-CM

## 2022-04-19 DIAGNOSIS — E06.3 HASHIMOTO'S THYROIDITIS: Primary | ICD-10-CM

## 2022-04-19 DIAGNOSIS — E78.2 MIXED HYPERLIPIDEMIA: ICD-10-CM

## 2022-04-19 DIAGNOSIS — Z12.31 ENCOUNTER FOR SCREENING MAMMOGRAM FOR MALIGNANT NEOPLASM OF BREAST: ICD-10-CM

## 2022-04-19 DIAGNOSIS — E55.9 VITAMIN D INSUFFICIENCY: ICD-10-CM

## 2022-04-19 LAB
T4 FREE SERPL-MCNC: 1.2 NG/DL (ref 0.8–1.8)
TSH SERPL-ACNC: 1.67 MIU/L

## 2022-04-19 PROCEDURE — 1160F PR REVIEW ALL MEDS BY PRESCRIBER/CLIN PHARMACIST DOCUMENTED: ICD-10-PCS | Mod: S$GLB,,, | Performed by: NURSE PRACTITIONER

## 2022-04-19 PROCEDURE — 3008F BODY MASS INDEX DOCD: CPT | Mod: S$GLB,,, | Performed by: NURSE PRACTITIONER

## 2022-04-19 PROCEDURE — 1160F RVW MEDS BY RX/DR IN RCRD: CPT | Mod: S$GLB,,, | Performed by: NURSE PRACTITIONER

## 2022-04-19 PROCEDURE — 99214 PR OFFICE/OUTPT VISIT, EST, LEVL IV, 30-39 MIN: ICD-10-PCS | Mod: S$GLB,,, | Performed by: NURSE PRACTITIONER

## 2022-04-19 PROCEDURE — 99214 OFFICE O/P EST MOD 30 MIN: CPT | Mod: S$GLB,,, | Performed by: NURSE PRACTITIONER

## 2022-04-19 PROCEDURE — 3008F PR BODY MASS INDEX (BMI) DOCUMENTED: ICD-10-PCS | Mod: S$GLB,,, | Performed by: NURSE PRACTITIONER

## 2022-04-19 RX ORDER — PANTOPRAZOLE SODIUM 40 MG/1
40 TABLET, DELAYED RELEASE ORAL DAILY
Qty: 90 TABLET | Refills: 1 | Status: SHIPPED | OUTPATIENT
Start: 2022-04-19 | End: 2023-04-18

## 2022-04-19 RX ORDER — LEVOTHYROXINE SODIUM 50 UG/1
50 TABLET ORAL
Qty: 90 TABLET | Refills: 1 | Status: SHIPPED | OUTPATIENT
Start: 2022-04-19 | End: 2022-10-11 | Stop reason: SDUPTHER

## 2022-04-19 RX ORDER — LANSOPRAZOLE 30 MG/1
30 CAPSULE, DELAYED RELEASE ORAL 2 TIMES DAILY
COMMUNITY
Start: 2022-04-02 | End: 2022-04-19

## 2022-04-19 NOTE — PROGRESS NOTES
Subjective:       Patient ID: Riddhi Thapa is a 51 y.o. female.    Chief Complaint: Thyroid Problem, Vitamin D Deficiency, and lab review    Patient presents today following up for her 6 month checkup on chronic conditions including anxiety, depressed mood, hyperlipidemia, GERD and hypothyroidism.  Patient is tolerating current medication regimen well with no side effects.  Patient is overweight with a BMI of 27.64    Hyperlipidemia  This is a recurrent problem. The current episode started more than 1 month ago. The problem is controlled. Recent lipid tests were reviewed and are variable. Exacerbating diseases include hypothyroidism and obesity. She has no history of chronic renal disease, diabetes, liver disease or nephrotic syndrome. Factors aggravating her hyperlipidemia include fatty foods. Pertinent negatives include no chest pain, focal weakness or leg pain. Current antihyperlipidemic treatment includes diet change and exercise. The current treatment provides significant improvement of lipids. Compliance problems include adherence to diet and adherence to exercise.  Risk factors for coronary artery disease include stress, dyslipidemia and obesity.   Thyroid Problem  Presents for follow-up visit. Patient reports no cold intolerance, constipation, diarrhea, dry skin, hair loss, heat intolerance, hoarse voice, nail problem or tremors. The symptoms have been stable. Her past medical history is significant for hyperlipidemia. There is no history of diabetes.   Anxiety  Presents for follow-up visit. Patient reports no chest pain or nausea. Symptoms occur occasionally. The severity of symptoms is mild. The quality of sleep is fair. Nighttime awakenings: occasional.     Compliance with medications is %.   Gastroesophageal Reflux  She complains of globus sensation and heartburn. She reports no abdominal pain, no chest pain, no coughing, no hoarse voice, no nausea, no sore throat or no wheezing. This is a  chronic problem. The current episode started more than 1 month ago. The problem has been waxing and waning. The heartburn duration is several minutes. The heartburn is located in the substernum. The heartburn is of severe intensity. The heartburn wakes her from sleep. The heartburn limits her activity. The heartburn changes with position. The symptoms are aggravated by certain foods and exertion. Pertinent negatives include no melena or muscle weakness. There are no known risk factors. She has tried a PPI for the symptoms. The treatment provided moderate relief. Past procedures do not include an abdominal ultrasound.     Review of Systems   Constitutional: Negative for activity change, appetite change and fever.        Obesity   HENT: Negative for congestion, ear discharge, ear pain, hearing loss, hoarse voice, sore throat, trouble swallowing and voice change.    Eyes: Negative for photophobia, pain, discharge and visual disturbance.   Respiratory: Negative for cough, chest tightness and wheezing.    Cardiovascular: Negative for chest pain.        Mild hyperlipidemia   Gastrointestinal: Positive for heartburn. Negative for abdominal pain, constipation, diarrhea, melena, nausea and vomiting.        Gerd   Endocrine: Negative for cold intolerance and heat intolerance.        Hypothyroidism   Genitourinary: Negative for difficulty urinating, dysuria, frequency, hematuria and urgency.   Musculoskeletal: Negative for arthralgias, gait problem and muscle weakness.   Skin: Negative for rash.   Allergic/Immunologic: Negative for immunocompromised state.   Neurological: Negative for tremors, focal weakness, speech difficulty and headaches.   Psychiatric/Behavioral: Negative for dysphoric mood and self-injury.       Past Medical History:   Diagnosis Date    Anxiety     Asthma     Depression     GERD (gastroesophageal reflux disease)     Hashimoto's thyroiditis     Hyperlipidemia     Hypothyroidism     Kidney stones        Past Surgical History:   Procedure Laterality Date    EXTRACORPOREAL SHOCK WAVE LITHOTRIPSY  2011    HYSTERECTOMY      LITHOTRIPSY      SINUS SURGERY  2015    TUBAL LIGATION      Bi-Lat       Family History   Problem Relation Age of Onset    Eczema Mother     Thyroid disease Mother     Cancer Father         Renal    Eczema Father     COPD Maternal Grandmother     Diabetes Maternal Grandmother     Stroke Maternal Grandmother     Thyroid disease Maternal Grandmother     Breast cancer Paternal Aunt     Asthma Neg Hx     Immunodeficiency Neg Hx     Rhinitis Neg Hx        Social History     Socioeconomic History    Marital status:    Occupational History    Occupation: Teacher     Comment: [employer]   Tobacco Use    Smoking status: Never Smoker    Smokeless tobacco: Never Used   Substance and Sexual Activity    Alcohol use: Yes     Comment: weekends only    Drug use: No    Sexual activity: Yes     Partners: Male       Current Outpatient Medications   Medication Sig Dispense Refill    azelastine-fluticasone (DYMISTA) 137-50 mcg/spray Spry nassal spray 1 spray by Each Nostril route 2 (two) times daily. 3 Bottle 1    cetirizine 10 mg Cap Take 1 capsule by mouth daily as needed.      cholecalciferol, vitamin D3, (VITAMIN D3) 50 mcg (2,000 unit) Cap Take 1 capsule by mouth once daily.      dextroamphetamine-amphetamine (ADDERALL XR) 30 MG 24 hr capsule Take 30 mg by mouth every morning.      ENBRACE HR 1.5 mg iron- 8.73 mg-6.4 mg capsule       peak flow meter (PERSONAL BEST FULL RANGE) Claudine To be used as directed by MD 1 each 0    PROAIR HFA 90 mcg/actuation inhaler INHALE TWO PUFFS BY MOUTH EVERY SIX HOURS AS NEEDED FOR WHEEZING  9 g 3    sucralfate (CARAFATE) 1 gram tablet       SYMBICORT 160-4.5 mcg/actuation HFAA INHALE TWO PUFFS BY MOUTH EVERY TWELVE HOURS  11 g 3    tiotropium bromide (SPIRIVA RESPIMAT) 1.25 mcg/actuation inhaler Inhale 2 puffs into the lungs once daily.  "Controller 4 g 3    valACYclovir (VALTREX) 500 MG tablet Take 1 tablet by mouth as needed.      ketoconazole (NIZORAL) 2 % shampoo Apply topically twice a week. (Patient not taking: No sig reported) 120 mL 2    meloxicam (MOBIC) 15 MG tablet Take 1 tablet (15 mg total) by mouth once daily. (Patient not taking: No sig reported) 30 tablet 2    ondansetron (ZOFRAN-ODT) 4 MG TbDL Take 1 tablet (4 mg total) by mouth 2 (two) times daily. (Patient not taking: No sig reported) 30 tablet 2    pantoprazole (PROTONIX) 40 MG tablet Take 1 tablet (40 mg total) by mouth once daily. 90 tablet 1    SYNTHROID 50 mcg tablet Take 1 tablet (50 mcg total) by mouth before breakfast. 90 tablet 1     Current Facility-Administered Medications   Medication Dose Route Frequency Provider Last Rate Last Admin    Allergy Mix   Subcutaneous Q30 Days Samara Jones MD   Given at 03/28/22 1049       Review of patient's allergies indicates:   Allergen Reactions    Nsaids (non-steroidal anti-inflammatory drug)      causes upset stomach, has GERD     Objective:      Blood pressure 128/80, pulse 74, temperature 98.3 °F (36.8 °C), height 5' 4" (1.626 m), weight 73 kg (161 lb), last menstrual period 09/16/2012, SpO2 98 %. Body mass index is 27.64 kg/m².   Physical Exam  Vitals and nursing note reviewed.   Constitutional:       General: She is not in acute distress.     Appearance: Normal appearance. She is well-developed.   HENT:      Head: Normocephalic and atraumatic.      Right Ear: Tympanic membrane normal.      Left Ear: Tympanic membrane normal.      Nose: Nose normal.      Mouth/Throat:      Mouth: Mucous membranes are moist.   Eyes:      General: Lids are normal. Lids are everted, no foreign bodies appreciated.      Conjunctiva/sclera: Conjunctivae normal.      Pupils: Pupils are equal, round, and reactive to light.      Right eye: Pupil is round and reactive.      Left eye: Pupil is round and reactive.   Neck:      Trachea: Trachea " normal.   Cardiovascular:      Rate and Rhythm: Normal rate and regular rhythm.      Pulses: Normal pulses.      Heart sounds: Normal heart sounds, S1 normal and S2 normal. No murmur heard.  Pulmonary:      Effort: Pulmonary effort is normal. No respiratory distress.      Breath sounds: Normal breath sounds.   Abdominal:      General: Abdomen is flat. Bowel sounds are normal.      Palpations: Abdomen is soft. Abdomen is not rigid.      Tenderness: There is no guarding.   Musculoskeletal:         General: Normal range of motion.      Cervical back: Normal range of motion and neck supple. No muscular tenderness.   Lymphadenopathy:      Cervical: No cervical adenopathy.   Skin:     General: Skin is warm and dry.      Capillary Refill: Capillary refill takes less than 2 seconds.   Neurological:      General: No focal deficit present.      Mental Status: She is alert and oriented to person, place, and time.      Cranial Nerves: No cranial nerve deficit.   Psychiatric:         Mood and Affect: Mood normal.         Behavior: Behavior normal. Behavior is cooperative.         Thought Content: Thought content normal.         Judgment: Judgment normal.             Assessment:       1. Hashimoto's thyroiditis    2. Vitamin D insufficiency    3. Mixed hyperlipidemia    4. Mixed anxiety depressive disorder    5. Gastroesophageal reflux disease without esophagitis    6. Encounter for screening mammogram for malignant neoplasm of breast    7. Overweight (BMI 25.0-29.9)        Plan:       Riddhi was seen today for thyroid problem, vitamin d deficiency and lab review.    Diagnoses and all orders for this visit:    Hashimoto's thyroiditis  -     SYNTHROID 50 mcg tablet; Take 1 tablet (50 mcg total) by mouth before breakfast.  -     TSH; Future  -     T4, Free; Future  -     TSH  -     T4, Free    Stable.  Continue current medication dose.    Vitamin D insufficiency  -     Vitamin D; Future  -     Vitamin D  Stable Continue current  supplement.    Mixed hyperlipidemia  -     Lipid Panel; Future  -     Lipid Panel    Limit red meat, butter, fried foods, cheese, and other foods that have a lot of saturated fat. Consume more: lean meats, fish, fruits, vegetables, whole grains, beans, lentils, and nuts.  Weight loss, and 30-45 min of cardiovascular exercise daily.     Mixed anxiety depressive disorder  -     Urinalysis; Future  -     Urinalysis    Gastroesophageal reflux disease without esophagitis  -     pantoprazole (PROTONIX) 40 MG tablet; Take 1 tablet (40 mg total) by mouth once daily.  -     CBC Auto Differential; Future  -     Comprehensive Metabolic Panel; Future  -     Urinalysis; Future  -     CBC Auto Differential  -     Comprehensive Metabolic Panel  -     Urinalysis    Encounter for screening mammogram for malignant neoplasm of breast  -     Mammo Digital Screening Bilat w/ [facility]; Future    Overweight (BMI 25.0-29.9)  The patient is asked to make an attempt to improve diet and exercise patterns to aid in medical management of this problem.         Follow up in 6 months for chronic condition management with labs prior to office visit. Cardiology referral for general check up at next visit for after 50 exam.

## 2022-04-26 ENCOUNTER — CLINICAL SUPPORT (OUTPATIENT)
Dept: ALLERGY | Facility: CLINIC | Age: 52
End: 2022-04-26
Payer: COMMERCIAL

## 2022-04-26 VITALS
TEMPERATURE: 97 F | BODY MASS INDEX: 27.31 KG/M2 | WEIGHT: 160 LBS | SYSTOLIC BLOOD PRESSURE: 122 MMHG | DIASTOLIC BLOOD PRESSURE: 80 MMHG | HEIGHT: 64 IN

## 2022-04-26 DIAGNOSIS — J30.81 ALLERGIC RHINITIS DUE TO ANIMAL HAIR AND DANDER: ICD-10-CM

## 2022-04-26 PROCEDURE — 95117 PR IMMU2THERAPY, 2+ INJECTIONS: ICD-10-PCS | Mod: S$GLB,,, | Performed by: ALLERGY & IMMUNOLOGY

## 2022-04-26 PROCEDURE — 95117 IMMUNOTHERAPY INJECTIONS: CPT | Mod: S$GLB,,, | Performed by: ALLERGY & IMMUNOLOGY

## 2022-04-26 NOTE — PROGRESS NOTES
Immunotherapy Note: Allergy Shot      Subjective:       Patient ID: Riddhi Thapa is a 51 y.o. female presents for monthly immunotherapy  Tolerated last injection  No New Medications  Beta Blockers: not on beta blocker    Reaction with last injection?: No  If female, are you pregnant?: No  Are you on any beta blockers?: No  Has the vial ?: No    Are you ill today?: No  Asthma exacerbation or symptoms: No  Do you have a fever today?: No    Peak Flow: 400    Expiration Date #1: 23  Vial Concentration: 1:1 v/v (RED)  Dose (mL) #1: 0.5 mL  Location: Right upper arm  Given By: RW    Expiration Date #2: 23  Vial Concentration: 1:1 v/v (RED)  Dose (mL) #2: 0.5 mL  Location: Left upper arm  Given By : JELLY         Objective:     Riddhi Thapa observed for 30 minutes and discharged in good condition        Discussion:     Pt understands the current recommendation. All questions answered to the best of my ability. Continue current management plan.      Electronically signed by Christen Calero LPN    Allergy/Imunology  [facility]  [address]  [place] [address]   Office [phone]

## 2022-05-02 ENCOUNTER — HOSPITAL ENCOUNTER (OUTPATIENT)
Dept: RADIOLOGY | Facility: HOSPITAL | Age: 52
Discharge: HOME | End: 2022-05-02
Attending: NURSE PRACTITIONER
Payer: COMMERCIAL

## 2022-05-02 DIAGNOSIS — Z12.31 ENCOUNTER FOR SCREENING MAMMOGRAM FOR MALIGNANT NEOPLASM OF BREAST: ICD-10-CM

## 2022-05-02 PROCEDURE — 77063 BREAST TOMOSYNTHESIS BI: CPT | Mod: TC,PO

## 2022-05-06 DIAGNOSIS — R92.8 ABNORMAL MAMMOGRAM: Primary | ICD-10-CM

## 2022-05-12 ENCOUNTER — HOSPITAL ENCOUNTER (OUTPATIENT)
Dept: RADIOLOGY | Facility: HOSPITAL | Age: 52
Discharge: HOME | End: 2022-05-12
Attending: NURSE PRACTITIONER
Payer: COMMERCIAL

## 2022-05-12 DIAGNOSIS — R92.8 ABNORMAL MAMMOGRAM: ICD-10-CM

## 2022-05-12 PROCEDURE — 76642 ULTRASOUND BREAST LIMITED: CPT | Mod: TC,PO,RT

## 2022-05-12 PROCEDURE — 77065 DX MAMMO INCL CAD UNI: CPT | Mod: TC,PO,RT

## 2022-05-24 ENCOUNTER — CLINICAL SUPPORT (OUTPATIENT)
Dept: ALLERGY | Facility: CLINIC | Age: 52
End: 2022-05-24
Payer: COMMERCIAL

## 2022-05-24 VITALS
HEIGHT: 64 IN | SYSTOLIC BLOOD PRESSURE: 128 MMHG | HEART RATE: 89 BPM | BODY MASS INDEX: 27.31 KG/M2 | DIASTOLIC BLOOD PRESSURE: 80 MMHG | WEIGHT: 160 LBS | OXYGEN SATURATION: 98 %

## 2022-05-24 DIAGNOSIS — J30.81 ALLERGIC RHINITIS DUE TO ANIMAL HAIR AND DANDER: ICD-10-CM

## 2022-05-24 PROCEDURE — 95117 PR IMMU2THERAPY, 2+ INJECTIONS: ICD-10-PCS | Mod: S$GLB,,, | Performed by: ALLERGY & IMMUNOLOGY

## 2022-05-24 PROCEDURE — 95117 IMMUNOTHERAPY INJECTIONS: CPT | Mod: S$GLB,,, | Performed by: ALLERGY & IMMUNOLOGY

## 2022-06-22 ENCOUNTER — CLINICAL SUPPORT (OUTPATIENT)
Dept: ALLERGY | Facility: CLINIC | Age: 52
End: 2022-06-22
Payer: COMMERCIAL

## 2022-06-22 VITALS
SYSTOLIC BLOOD PRESSURE: 120 MMHG | DIASTOLIC BLOOD PRESSURE: 70 MMHG | HEIGHT: 64 IN | WEIGHT: 160 LBS | BODY MASS INDEX: 27.31 KG/M2

## 2022-06-22 DIAGNOSIS — J30.81 ALLERGIC RHINITIS DUE TO CATS: ICD-10-CM

## 2022-06-22 DIAGNOSIS — J30.81 ALLERGIC RHINITIS DUE TO ANIMAL HAIR AND DANDER: ICD-10-CM

## 2022-06-22 PROCEDURE — 95117 PR IMMU2THERAPY, 2+ INJECTIONS: ICD-10-PCS | Mod: S$GLB,,, | Performed by: ALLERGY & IMMUNOLOGY

## 2022-06-22 PROCEDURE — 95117 IMMUNOTHERAPY INJECTIONS: CPT | Mod: S$GLB,,, | Performed by: ALLERGY & IMMUNOLOGY

## 2022-06-22 NOTE — PROGRESS NOTES
Immunotherapy Note: Allergy Shot      Subjective:       Patient ID: Riddhi Thapa is a 51 y.o. female presents for monthly immunotherapy  Tolerated last injection  No New Medications  Beta Blockers: not on beta blocker    Reaction with last injection?: No  If female, are you pregnant?: No  Are you on any beta blockers?: No  Has the vial ?: No    Are you ill today?: No  Asthma exacerbation or symptoms: No  Do you have a fever today?: No    Peak Flow: 420    Expiration Date #1: 23  Vial Concentration: 1:1 v/v (RED)  Dose (mL) #1: 0.5 mL  Location: Right upper arm  Given By: rw    Expiration Date #2: 23  Vial Concentration: 1:1 v/v (RED)  Dose (mL) #2: 0.5 mL  Location: Left upper arm  Given By : rw         Objective:     Riddhi Thapa observed for 30 minutes and discharged in good condition        Discussion:     Pt understands the current recommendation. All questions answered to the best of my ability. Continue current management plan.      Electronically signed by Christen Calero LPN    Allergy/Imunology  [facility]  [address]  [place] [address]   Office [phone]

## 2022-07-13 DIAGNOSIS — K21.9 GASTROESOPHAGEAL REFLUX DISEASE WITHOUT ESOPHAGITIS: Primary | ICD-10-CM

## 2022-07-13 RX ORDER — DEXLANSOPRAZOLE 60 MG/1
1 CAPSULE, DELAYED RELEASE ORAL EVERY MORNING
COMMUNITY
Start: 2022-04-20 | End: 2022-07-13 | Stop reason: SDUPTHER

## 2022-07-13 RX ORDER — DEXLANSOPRAZOLE 60 MG/1
1 CAPSULE, DELAYED RELEASE ORAL EVERY MORNING
Qty: 90 CAPSULE | Refills: 2 | Status: SHIPPED | OUTPATIENT
Start: 2022-07-13 | End: 2022-07-21 | Stop reason: SDUPTHER

## 2022-07-13 RX ORDER — DEXLANSOPRAZOLE 60 MG/1
1 CAPSULE, DELAYED RELEASE ORAL EVERY MORNING
Qty: 90 CAPSULE | Refills: 3 | Status: CANCELLED | OUTPATIENT
Start: 2022-07-13

## 2022-07-20 ENCOUNTER — CLINICAL SUPPORT (OUTPATIENT)
Dept: ALLERGY | Facility: CLINIC | Age: 52
End: 2022-07-20
Payer: COMMERCIAL

## 2022-07-20 VITALS
HEART RATE: 72 BPM | DIASTOLIC BLOOD PRESSURE: 70 MMHG | SYSTOLIC BLOOD PRESSURE: 120 MMHG | OXYGEN SATURATION: 98 % | HEIGHT: 64 IN | BODY MASS INDEX: 27.31 KG/M2 | WEIGHT: 160 LBS

## 2022-07-20 DIAGNOSIS — J30.81 ALLERGIC RHINITIS DUE TO ANIMAL HAIR AND DANDER: ICD-10-CM

## 2022-07-20 DIAGNOSIS — J30.81 ALLERGIC RHINITIS DUE TO CATS: ICD-10-CM

## 2022-07-20 PROCEDURE — 95117 PR IMMU2THERAPY, 2+ INJECTIONS: ICD-10-PCS | Mod: S$GLB,,, | Performed by: ALLERGY & IMMUNOLOGY

## 2022-07-20 PROCEDURE — 95117 IMMUNOTHERAPY INJECTIONS: CPT | Mod: S$GLB,,, | Performed by: ALLERGY & IMMUNOLOGY

## 2022-07-20 NOTE — PROGRESS NOTES
Immunotherapy Note: Allergy Shot      Subjective:       Patient ID: Riddhi Thapa is a 52 y.o. female presents for monthly immunotherapy  Tolerated last injection  No New Medications  Beta Blockers: not on beta blocker    Reaction with last injection?: No  If female, are you pregnant?: No  Are you on any beta blockers?: No  Has the vial ?: No    Are you ill today?: No  Asthma exacerbation or symptoms: No  Do you have a fever today?: No    Peak Flow: 400    Expiration Date #1: 23  Vial Concentration: 1:1 v/v (RED)  Dose (mL) #1: 0.5 mL  Location: Right upper arm  Given By: rw    Expiration Date #2: 23  Vial Concentration: 1:1 v/v (RED)  Dose (mL) #2: 0.5 mL  Location: Left upper arm  Given By : terry         Objective:     Riddhi Thapa observed for 30 minutes and discharged in good condition        Discussion:     Pt understands the current recommendation. All questions answered to the best of my ability. Continue current management plan.      Electronically signed by Christen Calero LPN    Allergy/Imunology  [facility]  [address]  [place] [address]   Office [phone]

## 2022-07-21 DIAGNOSIS — K21.9 GASTROESOPHAGEAL REFLUX DISEASE, UNSPECIFIED WHETHER ESOPHAGITIS PRESENT: Primary | ICD-10-CM

## 2022-07-21 RX ORDER — DEXLANSOPRAZOLE 60 MG/1
60 CAPSULE, DELAYED RELEASE ORAL EVERY MORNING
Qty: 90 CAPSULE | Refills: 3 | Status: SHIPPED | OUTPATIENT
Start: 2022-07-21 | End: 2023-10-26 | Stop reason: SDUPTHER

## 2022-07-21 RX ORDER — DEXLANSOPRAZOLE 60 MG/1
1 CAPSULE, DELAYED RELEASE ORAL EVERY MORNING
Qty: 90 CAPSULE | Refills: 2 | Status: CANCELLED | OUTPATIENT
Start: 2022-07-21 | End: 2023-07-21

## 2022-07-21 NOTE — TELEPHONE ENCOUNTER
Primary insurance exclusion so [employer] will not pay for Dexilant even though there is an approval. Will resend rx to local [facility] for pt to continue using her Good Rx card.

## 2022-08-10 NOTE — PROGRESS NOTES
Immunotherapy Note: Allergy Shot      Subjective:       Patient ID: Riddhi Thapa is a 51 y.o. female presents for monthly immunotherapy  Tolerated last injection  No New Medications  Beta Blockers: not on beta blocker    Reaction with last injection?: No  If female, are you pregnant?: No  Are you on any beta blockers?: No  Has the vial ?: No    Are you ill today?: No  Asthma exacerbation or symptoms: No  Do you have a fever today?: No    Peak Flow: 400    Expiration Date #1: 23  Vial Concentration: 1:1 v/v (RED)  Dose (mL) #1: 0.5 mL  Location: Right upper arm  Given By: rw    Expiration Date #2: 23  Vial Concentration: 1:1 v/v (RED)  Dose (mL) #2: 0.5 mL  Location: Left upper arm  Given By : terry         Objective:     Riddhi Thapa observed for 30 minutes and discharged in good condition        Discussion:     Pt understands the current recommendation. All questions answered to the best of my ability. Continue current management plan.      Electronically signed by Christen Calero LPN    Allergy/Imunology  [facility]  [address]  [place] [address]   Office [phone]       normal (ped)...

## 2022-08-17 ENCOUNTER — CLINICAL SUPPORT (OUTPATIENT)
Dept: ALLERGY | Facility: CLINIC | Age: 52
End: 2022-08-17
Payer: COMMERCIAL

## 2022-08-17 VITALS
SYSTOLIC BLOOD PRESSURE: 118 MMHG | HEIGHT: 64 IN | BODY MASS INDEX: 27.31 KG/M2 | OXYGEN SATURATION: 99 % | HEART RATE: 79 BPM | WEIGHT: 160 LBS | DIASTOLIC BLOOD PRESSURE: 70 MMHG

## 2022-08-17 DIAGNOSIS — J30.81 ALLERGIC RHINITIS DUE TO ANIMAL HAIR AND DANDER: ICD-10-CM

## 2022-08-17 PROCEDURE — 95117 IMMUNOTHERAPY INJECTIONS: CPT | Mod: S$GLB,,, | Performed by: ALLERGY & IMMUNOLOGY

## 2022-08-17 PROCEDURE — 95117 PR IMMU2THERAPY, 2+ INJECTIONS: ICD-10-PCS | Mod: S$GLB,,, | Performed by: ALLERGY & IMMUNOLOGY

## 2022-08-17 NOTE — PROGRESS NOTES
Immunotherapy Note: Allergy Shot      Subjective:       Patient ID: Riddhi Thapa is a 52 y.o. female presents for monthly immunotherapy  Tolerated last injection  No New Medications  Beta Blockers: not on beta blocker    Reaction with last injection?: No  If female, are you pregnant?: No  Are you on any beta blockers?: No  Has the vial ?: No    Are you ill today?: No  Asthma exacerbation or symptoms: No  Do you have a fever today?: No    Peak Flow: 400    Expiration Date #1: 23  Vial Concentration: 1:1 v/v (RED)  Dose (mL) #1: 0.5 mL  Location: Right upper arm  Given By: rw    Expiration Date #2: 23  Vial Concentration: 1:1 v/v (RED)  Dose (mL) #2: 0.5 mL  Location: Left upper arm  Given By : terry         Objective:     Riddhi Thapa observed for 30 minutes and discharged in good condition        Discussion:     Pt understands the current recommendation. All questions answered to the best of my ability. Continue current management plan.      Electronically signed by Christen Calero LPN    Allergy/Imunology  [facility]  [address]  [place] [address]   Office [phone]

## 2022-09-13 ENCOUNTER — CLINICAL SUPPORT (OUTPATIENT)
Dept: ALLERGY | Facility: CLINIC | Age: 52
End: 2022-09-13
Payer: COMMERCIAL

## 2022-09-13 VITALS
SYSTOLIC BLOOD PRESSURE: 128 MMHG | WEIGHT: 160 LBS | HEIGHT: 64 IN | BODY MASS INDEX: 27.31 KG/M2 | DIASTOLIC BLOOD PRESSURE: 70 MMHG

## 2022-09-13 DIAGNOSIS — J30.81 ALLERGIC RHINITIS DUE TO ANIMAL HAIR AND DANDER: Primary | ICD-10-CM

## 2022-09-13 PROCEDURE — 95117 IMMUNOTHERAPY INJECTIONS: CPT | Mod: S$GLB,,, | Performed by: ALLERGY & IMMUNOLOGY

## 2022-09-13 PROCEDURE — 95117 PR IMMU2THERAPY, 2+ INJECTIONS: ICD-10-PCS | Mod: S$GLB,,, | Performed by: ALLERGY & IMMUNOLOGY

## 2022-09-13 NOTE — PROGRESS NOTES
Immunotherapy Note: Allergy Shot      Subjective:       Patient ID: Riddhi Thapa is a 52 y.o. female presents for monthly immunotherapy  Tolerated last injection  No New Medications  Beta Blockers: not on beta blocker    Reaction with last injection?: No  If female, are you pregnant?: No  Are you on any beta blockers?: No  Has the vial ?: No    Are you ill today?: No  Asthma exacerbation or symptoms: No  Do you have a fever today?: No    Peak Flow: 400    Expiration Date #1: 23  Vial Concentration: 1:1 v/v (RED)  Dose (mL) #1: 0.5 mL  Location: Right upper arm  Given By: rw    Expiration Date #2: 23  Vial Concentration: 1:1 v/v (RED)  Dose (mL) #2: 0.5 mL  Location: Left upper arm  Given By : terry         Objective:     Riddhi Thapa observed for 30 minutes and discharged in good condition        Discussion:     Pt understands the current recommendation. All questions answered to the best of my ability. Continue current management plan.      Electronically signed by Christen Calero LPN    Allergy/Imunology  [facility]  [address]  [place] [address]   Office [phone]

## 2022-10-11 ENCOUNTER — OFFICE VISIT (OUTPATIENT)
Dept: FAMILY MEDICINE | Facility: CLINIC | Age: 52
End: 2022-10-11
Payer: COMMERCIAL

## 2022-10-11 ENCOUNTER — CLINICAL SUPPORT (OUTPATIENT)
Dept: ALLERGY | Facility: CLINIC | Age: 52
End: 2022-10-11
Payer: COMMERCIAL

## 2022-10-11 VITALS
HEIGHT: 64 IN | OXYGEN SATURATION: 99 % | BODY MASS INDEX: 28.28 KG/M2 | RESPIRATION RATE: 18 BRPM | TEMPERATURE: 99 F | DIASTOLIC BLOOD PRESSURE: 80 MMHG | HEART RATE: 77 BPM | WEIGHT: 165.63 LBS | SYSTOLIC BLOOD PRESSURE: 122 MMHG

## 2022-10-11 VITALS
HEIGHT: 64 IN | SYSTOLIC BLOOD PRESSURE: 132 MMHG | WEIGHT: 160 LBS | BODY MASS INDEX: 27.31 KG/M2 | DIASTOLIC BLOOD PRESSURE: 80 MMHG

## 2022-10-11 DIAGNOSIS — J30.81 ALLERGIC RHINITIS DUE TO ANIMAL HAIR AND DANDER: Primary | ICD-10-CM

## 2022-10-11 DIAGNOSIS — K21.9 GASTROESOPHAGEAL REFLUX DISEASE WITHOUT ESOPHAGITIS: ICD-10-CM

## 2022-10-11 DIAGNOSIS — E66.3 OVERWEIGHT (BMI 25.0-29.9): ICD-10-CM

## 2022-10-11 DIAGNOSIS — E55.9 VITAMIN D INSUFFICIENCY: ICD-10-CM

## 2022-10-11 DIAGNOSIS — B00.1 FEVER BLISTER: ICD-10-CM

## 2022-10-11 DIAGNOSIS — R00.2 PALPITATIONS: ICD-10-CM

## 2022-10-11 DIAGNOSIS — E78.2 MIXED HYPERLIPIDEMIA: ICD-10-CM

## 2022-10-11 DIAGNOSIS — Z23 NEED FOR INFLUENZA VACCINATION: ICD-10-CM

## 2022-10-11 DIAGNOSIS — E06.3 HASHIMOTO'S THYROIDITIS: Primary | ICD-10-CM

## 2022-10-11 LAB
25(OH)D3 SERPL-MCNC: 55 NG/ML (ref 30–100)
ALBUMIN SERPL-MCNC: 4.4 G/DL (ref 3.6–5.1)
ALBUMIN/GLOB SERPL: 1.8 (CALC) (ref 1–2.5)
ALP SERPL-CCNC: 58 U/L (ref 37–153)
ALT SERPL-CCNC: 9 U/L (ref 6–29)
APPEARANCE UR: CLEAR
AST SERPL-CCNC: 9 U/L (ref 10–35)
BASOPHILS # BLD AUTO: 42 CELLS/UL (ref 0–200)
BASOPHILS NFR BLD AUTO: 0.5 %
BILIRUB SERPL-MCNC: 0.7 MG/DL (ref 0.2–1.2)
BILIRUB UR QL STRIP: NEGATIVE
BUN SERPL-MCNC: 15 MG/DL (ref 7–25)
BUN/CREAT SERPL: ABNORMAL (CALC) (ref 6–22)
CALCIUM SERPL-MCNC: 9.1 MG/DL (ref 8.6–10.4)
CHLORIDE SERPL-SCNC: 103 MMOL/L (ref 98–110)
CHOLEST SERPL-MCNC: 193 MG/DL
CHOLEST/HDLC SERPL: 2.4 (CALC)
CO2 SERPL-SCNC: 29 MMOL/L (ref 20–32)
COLOR UR: YELLOW
CREAT SERPL-MCNC: 0.73 MG/DL (ref 0.5–1.03)
EGFR: 100 ML/MIN/1.73M2
EOSINOPHIL # BLD AUTO: 656 CELLS/UL (ref 15–500)
EOSINOPHIL NFR BLD AUTO: 7.9 %
ERYTHROCYTE [DISTWIDTH] IN BLOOD BY AUTOMATED COUNT: 12.2 % (ref 11–15)
GLOBULIN SER CALC-MCNC: 2.4 G/DL (CALC) (ref 1.9–3.7)
GLUCOSE SERPL-MCNC: 90 MG/DL (ref 65–99)
GLUCOSE UR QL STRIP: NEGATIVE
HCT VFR BLD AUTO: 39.9 % (ref 35–45)
HDLC SERPL-MCNC: 82 MG/DL
HGB BLD-MCNC: 13.6 G/DL (ref 11.7–15.5)
HGB UR QL STRIP: NEGATIVE
KETONES UR QL STRIP: NEGATIVE
LDLC SERPL CALC-MCNC: 95 MG/DL (CALC)
LEUKOCYTE ESTERASE UR QL STRIP: NEGATIVE
LYMPHOCYTES # BLD AUTO: 2200 CELLS/UL (ref 850–3900)
LYMPHOCYTES NFR BLD AUTO: 26.5 %
MCH RBC QN AUTO: 32.5 PG (ref 27–33)
MCHC RBC AUTO-ENTMCNC: 34.1 G/DL (ref 32–36)
MCV RBC AUTO: 95.5 FL (ref 80–100)
MONOCYTES # BLD AUTO: 490 CELLS/UL (ref 200–950)
MONOCYTES NFR BLD AUTO: 5.9 %
NEUTROPHILS # BLD AUTO: 4914 CELLS/UL (ref 1500–7800)
NEUTROPHILS NFR BLD AUTO: 59.2 %
NITRITE UR QL STRIP: NEGATIVE
NONHDLC SERPL-MCNC: 111 MG/DL (CALC)
PH UR STRIP: 5.5 [PH] (ref 5–8)
PLATELET # BLD AUTO: 299 THOUSAND/UL (ref 140–400)
PMV BLD REES-ECKER: 10.5 FL (ref 7.5–12.5)
POTASSIUM SERPL-SCNC: 4.2 MMOL/L (ref 3.5–5.3)
PROT SERPL-MCNC: 6.8 G/DL (ref 6.1–8.1)
PROT UR QL STRIP: NEGATIVE
RBC # BLD AUTO: 4.18 MILLION/UL (ref 3.8–5.1)
SODIUM SERPL-SCNC: 138 MMOL/L (ref 135–146)
SP GR UR STRIP: 1.02 (ref 1–1.03)
T4 FREE SERPL-MCNC: 1.1 NG/DL (ref 0.8–1.8)
TRIGL SERPL-MCNC: 73 MG/DL
TSH SERPL-ACNC: 1.2 MIU/L
WBC # BLD AUTO: 8.3 THOUSAND/UL (ref 3.8–10.8)

## 2022-10-11 PROCEDURE — 3079F PR MOST RECENT DIASTOLIC BLOOD PRESSURE 80-89 MM HG: ICD-10-PCS | Mod: CPTII,S$GLB,, | Performed by: NURSE PRACTITIONER

## 2022-10-11 PROCEDURE — 3074F PR MOST RECENT SYSTOLIC BLOOD PRESSURE < 130 MM HG: ICD-10-PCS | Mod: CPTII,S$GLB,, | Performed by: NURSE PRACTITIONER

## 2022-10-11 PROCEDURE — 95117 IMMUNOTHERAPY INJECTIONS: CPT | Mod: S$GLB,,, | Performed by: ALLERGY & IMMUNOLOGY

## 2022-10-11 PROCEDURE — 90686 IIV4 VACC NO PRSV 0.5 ML IM: CPT | Mod: S$GLB,,, | Performed by: NURSE PRACTITIONER

## 2022-10-11 PROCEDURE — 99214 PR OFFICE/OUTPT VISIT, EST, LEVL IV, 30-39 MIN: ICD-10-PCS | Mod: 25,S$GLB,, | Performed by: NURSE PRACTITIONER

## 2022-10-11 PROCEDURE — 1159F PR MEDICATION LIST DOCUMENTED IN MEDICAL RECORD: ICD-10-PCS | Mod: CPTII,S$GLB,, | Performed by: NURSE PRACTITIONER

## 2022-10-11 PROCEDURE — 3079F DIAST BP 80-89 MM HG: CPT | Mod: CPTII,S$GLB,, | Performed by: NURSE PRACTITIONER

## 2022-10-11 PROCEDURE — 3008F BODY MASS INDEX DOCD: CPT | Mod: CPTII,S$GLB,, | Performed by: NURSE PRACTITIONER

## 2022-10-11 PROCEDURE — 3074F SYST BP LT 130 MM HG: CPT | Mod: CPTII,S$GLB,, | Performed by: NURSE PRACTITIONER

## 2022-10-11 PROCEDURE — 1159F MED LIST DOCD IN RCRD: CPT | Mod: CPTII,S$GLB,, | Performed by: NURSE PRACTITIONER

## 2022-10-11 PROCEDURE — 95117 PR IMMU2THERAPY, 2+ INJECTIONS: ICD-10-PCS | Mod: S$GLB,,, | Performed by: ALLERGY & IMMUNOLOGY

## 2022-10-11 PROCEDURE — 1160F PR REVIEW ALL MEDS BY PRESCRIBER/CLIN PHARMACIST DOCUMENTED: ICD-10-PCS | Mod: CPTII,S$GLB,, | Performed by: NURSE PRACTITIONER

## 2022-10-11 PROCEDURE — 90471 IMMUNIZATION ADMIN: CPT | Mod: S$GLB,,, | Performed by: NURSE PRACTITIONER

## 2022-10-11 PROCEDURE — 3008F PR BODY MASS INDEX (BMI) DOCUMENTED: ICD-10-PCS | Mod: CPTII,S$GLB,, | Performed by: NURSE PRACTITIONER

## 2022-10-11 PROCEDURE — 90471 FLU VACCINE (QUAD) GREATER THAN OR EQUAL TO 3YO PRESERVATIVE FREE IM: ICD-10-PCS | Mod: S$GLB,,, | Performed by: NURSE PRACTITIONER

## 2022-10-11 PROCEDURE — 90686 FLU VACCINE (QUAD) GREATER THAN OR EQUAL TO 3YO PRESERVATIVE FREE IM: ICD-10-PCS | Mod: S$GLB,,, | Performed by: NURSE PRACTITIONER

## 2022-10-11 PROCEDURE — 99214 OFFICE O/P EST MOD 30 MIN: CPT | Mod: 25,S$GLB,, | Performed by: NURSE PRACTITIONER

## 2022-10-11 PROCEDURE — 1160F RVW MEDS BY RX/DR IN RCRD: CPT | Mod: CPTII,S$GLB,, | Performed by: NURSE PRACTITIONER

## 2022-10-11 RX ORDER — VALACYCLOVIR HYDROCHLORIDE 500 MG/1
500 TABLET, FILM COATED ORAL
Status: CANCELLED | OUTPATIENT
Start: 2022-10-11

## 2022-10-11 RX ORDER — VALACYCLOVIR HYDROCHLORIDE 1 G/1
TABLET, FILM COATED ORAL
Qty: 90 TABLET | Refills: 1 | Status: SHIPPED | OUTPATIENT
Start: 2022-10-11 | End: 2024-01-02

## 2022-10-11 RX ORDER — LEVOTHYROXINE SODIUM 50 UG/1
50 TABLET ORAL
Qty: 90 TABLET | Refills: 1 | Status: SHIPPED | OUTPATIENT
Start: 2022-10-11 | End: 2023-05-08

## 2022-10-11 RX ORDER — LEVOTHYROXINE SODIUM 50 UG/1
50 TABLET ORAL
Qty: 90 TABLET | Refills: 1 | Status: CANCELLED | OUTPATIENT
Start: 2022-10-11

## 2022-10-11 NOTE — PROGRESS NOTES
Subjective:       Patient ID: Riddhi Thapa is a 52 y.o. female.    Chief Complaint: No chief complaint on file.    Patient presents today following up for her 6 month checkup on chronic conditions including anxiety, depressed mood, hyperlipidemia, GERD and hypothyroidism.  Patient is tolerating current medication regimen well with no side effects.  Patient has had her labs completed and will be reviewed at today's office visit.  Labs are within acceptable range in stable.    Patient also mentions concern regarding a long episode of tachycardia she recently experienced.  Patient states that she started to have a recent heart rate at home.  She measured her heart rate at 170 beats per minute.  She then went out and about on her normal day and the tachycardia was persistent.  The tachycardia started after bending over and rapidly rising to an upright position.  She tried to bear down to try to correct the heart rate assuming a vasovagal response.  This did not help or correct her heart rate.  Patient proceeded to go to sleep that night with a a racing heart rate still about 170 beats per minute.  She awoke the next morning exhausted with still a significant tachycardia.  She did notice that later that day her heart rate normalized and slow started to reduce to about 120 beats per minute.  The episode of prolonged tachycardia occurred for about 12-16 hours.  Patient has not had any cardiology workup.  This was the 1st event she has ever had like this.  She has had palpitations in the past but never any episode lasting this long.  Patient was not evaluated at the emergency room or with any medical office facility.    Patient is overweight with a BMI of 28.43    Hyperlipidemia  This is a recurrent problem. The current episode started more than 1 month ago. The problem is controlled. Recent lipid tests were reviewed and are variable. Exacerbating diseases include hypothyroidism and obesity. She has no history of  chronic renal disease, diabetes, liver disease or nephrotic syndrome. Factors aggravating her hyperlipidemia include fatty foods. Pertinent negatives include no chest pain, focal weakness or leg pain. Current antihyperlipidemic treatment includes diet change and exercise. The current treatment provides significant improvement of lipids. Compliance problems include adherence to diet and adherence to exercise.  Risk factors for coronary artery disease include stress, dyslipidemia and obesity.   Thyroid Problem  Presents for follow-up visit. Symptoms include palpitations. Patient reports no cold intolerance, constipation, diarrhea, dry skin, hair loss, heat intolerance, hoarse voice, menstrual problem, nail problem or tremors. The symptoms have been stable. Her past medical history is significant for hyperlipidemia. There is no history of diabetes.   Anxiety  Presents for follow-up visit. Symptoms include palpitations. Patient reports no chest pain, confusion or nausea. Symptoms occur occasionally. The severity of symptoms is mild. The quality of sleep is fair. Nighttime awakenings: occasional.     Compliance with medications is %.   Gastroesophageal Reflux  She complains of globus sensation and heartburn. She reports no abdominal pain, no chest pain, no coughing, no hoarse voice, no nausea, no sore throat or no wheezing. This is a chronic problem. The current episode started more than 1 month ago. The problem has been waxing and waning. The heartburn duration is several minutes. The heartburn is located in the substernum. The heartburn is of severe intensity. The heartburn wakes her from sleep. The heartburn limits her activity. The heartburn changes with position. The symptoms are aggravated by certain foods and exertion. Pertinent negatives include no melena or muscle weakness. There are no known risk factors. She has tried a PPI for the symptoms. The treatment provided moderate relief. Past procedures do not  include an abdominal ultrasound.   Review of Systems   Constitutional:  Negative for activity change, appetite change, fever and unexpected weight change.        Obesity   HENT:  Negative for congestion, ear discharge, ear pain, hearing loss, hoarse voice, rhinorrhea, sore throat, trouble swallowing and voice change.    Eyes:  Negative for photophobia, pain, discharge and visual disturbance.   Respiratory:  Negative for cough, chest tightness and wheezing.    Cardiovascular:  Positive for palpitations. Negative for chest pain.        Mild hyperlipidemia   Gastrointestinal:  Positive for heartburn. Negative for abdominal pain, blood in stool, constipation, diarrhea, melena, nausea and vomiting.        Gerd   Endocrine: Negative for cold intolerance, heat intolerance, polydipsia and polyuria.        Hypothyroidism   Genitourinary:  Negative for difficulty urinating, dysuria, frequency, hematuria, menstrual problem and urgency.   Musculoskeletal:  Negative for arthralgias, gait problem, joint swelling, muscle weakness and neck pain.   Skin:  Negative for rash.   Allergic/Immunologic: Negative for immunocompromised state.   Neurological:  Negative for tremors, focal weakness, speech difficulty, weakness and headaches.   Psychiatric/Behavioral:  Negative for confusion, dysphoric mood and self-injury.      Past Medical History:   Diagnosis Date    Anxiety     Asthma     Depression     GERD (gastroesophageal reflux disease)     Hashimoto's thyroiditis     Hyperlipidemia     Hypothyroidism     Kidney stones       Past Surgical History:   Procedure Laterality Date    EXTRACORPOREAL SHOCK WAVE LITHOTRIPSY  2011    HYSTERECTOMY      LITHOTRIPSY      SINUS SURGERY  2015    TUBAL LIGATION      Bi-Lat       Family History   Problem Relation Age of Onset    Eczema Mother     Thyroid disease Mother     Cancer Father         Renal    Eczema Father     COPD Maternal Grandmother     Diabetes Maternal Grandmother     Stroke Maternal  Grandmother     Thyroid disease Maternal Grandmother     Breast cancer Paternal Aunt     Asthma Neg Hx     Immunodeficiency Neg Hx     Rhinitis Neg Hx        Social History     Socioeconomic History    Marital status:    Occupational History    Occupation: Teacher     Comment: [employer]    Tobacco Use    Smoking status: Never    Smokeless tobacco: Never   Substance and Sexual Activity    Alcohol use: Yes     Comment: weekends only    Drug use: No    Sexual activity: Yes     Partners: Male     Social Determinants of Health     Financial Resource Strain: Unknown    Difficulty of Paying Living Expenses: Patient refused   Food Insecurity: Unknown    Worried About Running Out of Food in the Last Year: Patient refused   Transportation Needs: Unknown    Lack of Transportation (Medical): Patient refused    Lack of Transportation (Non-Medical): Patient refused   Physical Activity: Insufficiently Active    Days of Exercise per Week: 3 days    Minutes of Exercise per Session: 30 min   Stress: No Stress Concern Present    Feeling of Stress : Only a little   Social Connections: Unknown    Frequency of Communication with Friends and Family: Patient refused    Frequency of Social Gatherings with Friends and Family: Patient refused    Active Member of Clubs or Organizations: Patient refused    Attends Club or Organization Meetings: Patient refused    Marital Status:    Housing Stability: Unknown    Unable to Pay for Housing in the Last Year: Patient refused    Unstable Housing in the Last Year: Patient refused       Current Outpatient Medications   Medication Sig Dispense Refill    albuterol (PROVENTIL/VENTOLIN HFA) 90 mcg/actuation inhaler INHALE TWO PUFFS BY MOUTH EVERY SIX HOURS AS NEEDED FOR WHEEZING 18 g 2    azelastine-fluticasone (DYMISTA) 137-50 mcg/spray Spry nassal spray 1 spray by Each Nostril route 2 (two) times daily. 3 Bottle 1    cetirizine 10 mg Cap Take 1 capsule by mouth daily as needed.       "cholecalciferol, vitamin D3, (VITAMIN D3) 50 mcg (2,000 unit) Cap Take 1 capsule by mouth once daily.      dexlansoprazole (DEXILANT) 60 mg capsule Take 1 capsule (60 mg total) by mouth every morning. 90 capsule 3    dextroamphetamine-amphetamine (ADDERALL XR) 30 MG 24 hr capsule Take 30 mg by mouth every morning.      ENBRACE HR 1.5 mg iron- 8.73 mg-6.4 mg capsule       pantoprazole (PROTONIX) 40 MG tablet Take 1 tablet (40 mg total) by mouth once daily. 90 tablet 1    peak flow meter (PERSONAL BEST FULL RANGE) Claudine To be used as directed by MD 1 each 0    SYMBICORT 160-4.5 mcg/actuation HFAA INHALE TWO PUFFS BY MOUTH EVERY TWELVE HOURS 11 g 3    SYNTHROID 50 mcg tablet Take 1 tablet (50 mcg total) by mouth before breakfast. 90 tablet 1    valACYclovir (VALTREX) 1000 MG tablet 2 tablets PO at onset of fever blister. Repeat 2 tablets PO in 12 hours. Repeat for recurrent outbreaks. 90 tablet 1     Current Facility-Administered Medications   Medication Dose Route Frequency Provider Last Rate Last Admin    Allergy Mix   Subcutaneous Q30 Days Samara Jones MD   Given at 09/13/22 1100       Review of patient's allergies indicates:   Allergen Reactions    Nsaids (non-steroidal anti-inflammatory drug)      causes upset stomach, has GERD     Objective:      Blood pressure 122/80, pulse 77, temperature 98.7 °F (37.1 °C), resp. rate 18, height 5' 4" (1.626 m), weight 75.1 kg (165 lb 9.6 oz), last menstrual period 09/16/2012, SpO2 99 %. Body mass index is 28.43 kg/m².   Physical Exam  Vitals and nursing note reviewed.   Constitutional:       General: She is not in acute distress.     Appearance: Normal appearance. She is well-developed. She is not ill-appearing.   HENT:      Head: Normocephalic and atraumatic.      Right Ear: Tympanic membrane, ear canal and external ear normal.      Left Ear: Tympanic membrane, ear canal and external ear normal.      Nose: Nose normal.      Mouth/Throat:      Mouth: Mucous membranes are " moist.   Eyes:      General: Lids are normal. Lids are everted, no foreign bodies appreciated.      Conjunctiva/sclera: Conjunctivae normal.      Pupils: Pupils are equal, round, and reactive to light.      Right eye: Pupil is round and reactive.      Left eye: Pupil is round and reactive.   Neck:      Trachea: Trachea normal.   Cardiovascular:      Rate and Rhythm: Normal rate and regular rhythm.      Pulses: Normal pulses.      Heart sounds: Normal heart sounds, S1 normal and S2 normal. No murmur heard.  Pulmonary:      Effort: Pulmonary effort is normal. No respiratory distress.      Breath sounds: Normal breath sounds.   Abdominal:      General: Abdomen is flat. Bowel sounds are normal.      Palpations: Abdomen is soft. Abdomen is not rigid.      Tenderness: There is no guarding.   Musculoskeletal:         General: Normal range of motion.      Cervical back: Normal range of motion and neck supple. No muscular tenderness.   Lymphadenopathy:      Cervical: No cervical adenopathy.   Skin:     General: Skin is warm and dry.      Capillary Refill: Capillary refill takes less than 2 seconds.   Neurological:      General: No focal deficit present.      Mental Status: She is alert and oriented to person, place, and time. Mental status is at baseline.      Cranial Nerves: No cranial nerve deficit.   Psychiatric:         Mood and Affect: Mood normal.         Behavior: Behavior normal. Behavior is cooperative.         Thought Content: Thought content normal.         Judgment: Judgment normal.           Assessment:       1. Hashimoto's thyroiditis    2. Need for influenza vaccination    3. Vitamin D insufficiency    4. Mixed hyperlipidemia    5. Gastroesophageal reflux disease without esophagitis    6. Fever blister    7. Palpitations    8. Overweight (BMI 25.0-29.9)        Plan:       Diagnoses and all orders for this visit:    Hashimoto's thyroiditis  -     SYNTHROID 50 mcg tablet; Take 1 tablet (50 mcg total) by mouth  before breakfast.  -     T4, Free; Future  -     TSH; Future  -     Urinalysis; Future  -     T4, Free  -     TSH  -     Urinalysis    Stable.  Continue current thyroid medication dose    Need for influenza vaccination  -     Influenza - Quadrivalent *Preferred* (6 months+) (PF)  Updated    Vitamin D insufficiency  -     Vitamin D; Future  -     Vitamin D  Patient has been taking vitamin-D every other day during the summer.  Patient typically takes every day during the winter.  Agreeable.  Continue current dose    Mixed hyperlipidemia  -     Lipid Panel; Future  -     Lipid Panel  Limit red meat, butter, fried foods, cheese, and other foods that have a lot of saturated fat. Consume more: lean meats, fish, fruits, vegetables, whole grains, beans, lentils, and nuts.  Weight loss, and 30-45 min of cardiovascular exercise daily.      Gastroesophageal reflux disease without esophagitis  -     Comprehensive Metabolic Panel; Future  -     Comprehensive Metabolic Panel    Fever blister  -     valACYclovir (VALTREX) 1000 MG tablet; 2 tablets PO at onset of fever blister. Repeat 2 tablets PO in 12 hours. Repeat for recurrent outbreaks.    Palpitations  -     Ambulatory referral/consult to Cardiology; Future  -     CBC Auto Differential; Future  -     Comprehensive Metabolic Panel; Future  -     CBC Auto Differential  -     Comprehensive Metabolic Panel    Follow-up with cardiology as discussed.    Overweight (BMI 25.0-29.9)  The patient is asked to make an attempt to improve diet and exercise patterns to aid in medical management of this problem.    Other orders  The following orders have not been finalized:  -     Cancel: valACYclovir (VALTREX) 500 MG tablet  -     Cancel: SYNTHROID 50 mcg tablet

## 2022-10-11 NOTE — PROGRESS NOTES
Immunotherapy Note: Allergy Shot      Subjective:       Patient ID: Riddhi Thapa is a 52 y.o. female presents for monthly immunotherapy  Tolerated last injection  No New Medications  Beta Blockers: not on beta blocker    Reaction with last injection?: No  If female, are you pregnant?: No  Are you on any beta blockers?: No  Has the vial ?: No    Are you ill today?: No  Asthma exacerbation or symptoms: No  Do you have a fever today?: No    Peak Flow: 400    Expiration Date #1: 23  Vial Concentration: 1:1 v/v (RED)  Dose (mL) #1: 0.5 mL  Location: Right upper arm  Given By: rw    Expiration Date #2: 23  Vial Concentration: 1:1 v/v (RED)  Dose (mL) #2: 0.5 mL  Location: Left upper arm  Given By : terry         Objective:     Riddhi Thapa observed for 30 minutes and discharged in good condition        Discussion:     Pt understands the current recommendation. All questions answered to the best of my ability. Continue current management plan.      Electronically signed by Christen Calero LPN    Allergy/Imunology  [facility]  [address]  [place] [address]   Office [phone]

## 2023-02-14 ENCOUNTER — HOSPITAL ENCOUNTER (OUTPATIENT)
Dept: RADIOLOGY | Facility: CLINIC | Age: 53
Discharge: HOME | End: 2023-02-14
Attending: PODIATRIST
Payer: COMMERCIAL

## 2023-02-14 ENCOUNTER — OFFICE VISIT (OUTPATIENT)
Dept: PODIATRY | Facility: CLINIC | Age: 53
End: 2023-02-14
Payer: COMMERCIAL

## 2023-02-14 VITALS — BODY MASS INDEX: 27.83 KG/M2 | RESPIRATION RATE: 16 BRPM | WEIGHT: 163 LBS | HEIGHT: 64 IN

## 2023-02-14 DIAGNOSIS — M19.079 INFLAMMATION OF FOOT JOINT: ICD-10-CM

## 2023-02-14 DIAGNOSIS — S93.529A SPRAIN, METATARSOPHALANGEAL JOINT, INITIAL ENCOUNTER: Primary | ICD-10-CM

## 2023-02-14 DIAGNOSIS — M79.672 LEFT FOOT PAIN: ICD-10-CM

## 2023-02-14 DIAGNOSIS — M72.2 PLANTAR FASCIITIS: ICD-10-CM

## 2023-02-14 PROCEDURE — 1159F MED LIST DOCD IN RCRD: CPT | Mod: CPTII,S$GLB,, | Performed by: PODIATRIST

## 2023-02-14 PROCEDURE — 99203 OFFICE O/P NEW LOW 30 MIN: CPT | Mod: S$GLB,,, | Performed by: PODIATRIST

## 2023-02-14 PROCEDURE — 3008F BODY MASS INDEX DOCD: CPT | Mod: CPTII,S$GLB,, | Performed by: PODIATRIST

## 2023-02-14 PROCEDURE — 3008F PR BODY MASS INDEX (BMI) DOCUMENTED: ICD-10-PCS | Mod: CPTII,S$GLB,, | Performed by: PODIATRIST

## 2023-02-14 PROCEDURE — 73630 XR FOOT COMPLETE 3 VIEW LEFT: ICD-10-PCS | Mod: LT,S$GLB,, | Performed by: RADIOLOGY

## 2023-02-14 PROCEDURE — 99203 PR OFFICE/OUTPT VISIT, NEW, LEVL III, 30-44 MIN: ICD-10-PCS | Mod: S$GLB,,, | Performed by: PODIATRIST

## 2023-02-14 PROCEDURE — 73630 X-RAY EXAM OF FOOT: CPT | Mod: LT,S$GLB,, | Performed by: RADIOLOGY

## 2023-02-14 PROCEDURE — 1159F PR MEDICATION LIST DOCUMENTED IN MEDICAL RECORD: ICD-10-PCS | Mod: CPTII,S$GLB,, | Performed by: PODIATRIST

## 2023-02-14 PROCEDURE — 1160F PR REVIEW ALL MEDS BY PRESCRIBER/CLIN PHARMACIST DOCUMENTED: ICD-10-PCS | Mod: CPTII,S$GLB,, | Performed by: PODIATRIST

## 2023-02-14 PROCEDURE — 1160F RVW MEDS BY RX/DR IN RCRD: CPT | Mod: CPTII,S$GLB,, | Performed by: PODIATRIST

## 2023-02-14 NOTE — PROGRESS NOTES
"  [address] [facility] [place] [place] [address]  Phone: [phone]   [phone]    Patient's PCP:JEANNE Schmidt  Referring Provider: Sy Self    Subjective:      Chief Complaint:: Foot Pain (Pain in the lateral side of the the left foot.)    Foot Pain  Associated symptoms include arthralgias, joint swelling and myalgias. Pertinent negatives include no abdominal pain, chest pain, chills, coughing, fatigue, fever, headaches, nausea, numbness, rash or weakness.   Riddhi Thapa is a 52 y.o. female who presents today with a complaint of pain in the lateral side of the left foot lasting for 8 months. Onset of symptoms 8 months ago and reports no trauma.  Current symptoms include pain .  Aggravating factors are when first waking up in the morning. Symptoms have not improved. Treatment to date have included tylenol.        Vitals:    02/14/23 1527   Resp: 16   Weight: 73.9 kg (163 lb)   Height: 5' 4.02" (1.626 m)   PainSc:   2      Shoe Size: 7.5-8    Past Surgical History:   Procedure Laterality Date    EXTRACORPOREAL SHOCK WAVE LITHOTRIPSY  2011    HYSTERECTOMY      LITHOTRIPSY      SINUS SURGERY  2015    TUBAL LIGATION      Bi-Lat     Past Medical History:   Diagnosis Date    Anxiety     Asthma     Depression     GERD (gastroesophageal reflux disease)     Hashimoto's thyroiditis     Hyperlipidemia     Hypothyroidism     Kidney stones      Family History   Problem Relation Age of Onset    Eczema Mother     Thyroid disease Mother     Cancer Father         Renal    Eczema Father     COPD Maternal Grandmother     Diabetes Maternal Grandmother     Stroke Maternal Grandmother     Thyroid disease Maternal Grandmother     Breast cancer Paternal Aunt     Asthma Neg Hx     Immunodeficiency Neg Hx     Rhinitis Neg Hx         Social History:   Marital Status:   Alcohol History:  reports current alcohol use.  Tobacco History:  reports that she has never smoked. She has never used smokeless " tobacco.  Drug History:  reports no history of drug use.    Review of patient's allergies indicates:   Allergen Reactions    Nsaids (non-steroidal anti-inflammatory drug)      causes upset stomach, has GERD       Current Outpatient Medications   Medication Sig Dispense Refill    albuterol (PROVENTIL/VENTOLIN HFA) 90 mcg/actuation inhaler INHALE TWO PUFFS BY MOUTH EVERY SIX HOURS AS NEEDED FOR WHEEZING 18 g 2    azelastine-fluticasone (DYMISTA) 137-50 mcg/spray Spry nassal spray 1 spray by Each Nostril route 2 (two) times daily. 3 Bottle 1    cetirizine 10 mg Cap Take 1 capsule by mouth daily as needed.      cholecalciferol, vitamin D3, (VITAMIN D3) 50 mcg (2,000 unit) Cap Take 1 capsule by mouth once daily.      dexlansoprazole (DEXILANT) 60 mg capsule Take 1 capsule (60 mg total) by mouth every morning. 90 capsule 3    dextroamphetamine-amphetamine (ADDERALL XR) 30 MG 24 hr capsule Take 30 mg by mouth every morning.      ENBRACE HR 1.5 mg iron- 8.73 mg-6.4 mg capsule       peak flow meter (PERSONAL BEST FULL RANGE) Claudine To be used as directed by MD 1 each 0    SYMBICORT 160-4.5 mcg/actuation HFAA INHALE TWO PUFFS BY MOUTH EVERY TWELVE HOURS 11 g 3    SYNTHROID 50 mcg tablet Take 1 tablet (50 mcg total) by mouth before breakfast. 90 tablet 1    valACYclovir (VALTREX) 1000 MG tablet 2 tablets PO at onset of fever blister. Repeat 2 tablets PO in 12 hours. Repeat for recurrent outbreaks. 90 tablet 1    pantoprazole (PROTONIX) 40 MG tablet Take 1 tablet (40 mg total) by mouth once daily. (Patient not taking: Reported on 2/14/2023) 90 tablet 1     Current Facility-Administered Medications   Medication Dose Route Frequency Provider Last Rate Last Admin    Allergy Mix   Subcutaneous Q30 Days Samara Jones MD   Given at 09/13/22 1100       Review of Systems   Constitutional:  Negative for chills, fatigue, fever and unexpected weight change.   HENT:  Negative for hearing loss and trouble swallowing.    Eyes:  Negative  for photophobia and visual disturbance.   Respiratory:  Negative for cough, shortness of breath and wheezing.    Cardiovascular:  Negative for chest pain, palpitations and leg swelling.   Gastrointestinal:  Negative for abdominal pain and nausea.   Genitourinary:  Negative for dysuria and frequency.   Musculoskeletal:  Positive for arthralgias, joint swelling and myalgias. Negative for back pain and gait problem.   Skin:  Negative for rash and wound.   Neurological:  Negative for tremors, seizures, weakness, numbness and headaches.   Hematological:  Does not bruise/bleed easily.       Objective:        Physical Exam:   Foot Exam    General  General Appearance: appears stated age and healthy   Orientation: alert and oriented to person, place, and time   Affect: appropriate   Gait: antalgic       Left Foot/Ankle      Inspection and Palpation  Ecchymosis: none  Tenderness: lesser metatarsophalangeal joints (Pain by 5th metatarsal head in 4th intermetatarsal space no masses felt no crepitus on range of motion)  Swelling: none   Hammertoes: absent  Claw toes: absent  Hallux valgus: no  Hallux limitus: no  Skin Exam: skin intact;   Neurovascular  Dorsalis pedis: 2+  Posterior tibial: 2+  Capillary refill: 2+  Saphenous nerve sensation: normal  Tibial nerve sensation: normal  Superficial peroneal nerve sensation: normal  Deep peroneal nerve sensation: normal  Sural nerve sensation: normal    Muscle Strength  Ankle dorsiflexion: 5  Ankle plantar flexion: 5  Ankle inversion: 5  Ankle eversion: 5  Great toe extension: 5  Great toe flexion: 5    Range of Motion    Normal left ankle ROM      Physical Exam  Cardiovascular:      Pulses:           Dorsalis pedis pulses are 2+ on the left side.        Posterior tibial pulses are 2+ on the left side.   Musculoskeletal:      Left foot: No bunion.        Feet:              Left Ankle/Foot Exam     Tenderness   The patient is tender to palpation of the lesser metatarsophalangeal  joints.    Range of Motion   The patient has normal left ankle ROM.     Muscle Strength   The patient has normal left ankle strength.      Muscle Strength   Left Lower Extremity   Ankle Dorsiflexion:  5   Plantar flexion:  5/5     Vascular Exam       Left Pulses  Dorsalis Pedis:      2+  Posterior Tibial:      2+         Imaging:   AP, lateral, lateral oblique weight-bearing x-rays left foot:  No acute fractures, no bone tumors, no soft tissue masses seen.  Only questionable area at the 5th metatarsal head she has an accessory ossicle on the plantar medial aspect.  Does not appear to be fragmented does not appear to be out of position but it is under the metatarsal head.         Assessment:       1. Sprain, metatarsophalangeal joint, initial encounter - Left Foot    2. Left foot pain    3. Plantar fasciitis    4. Inflammation of foot joint - Left Foot      Plan:   Sprain, metatarsophalangeal joint, initial encounter - Left Foot    Left foot pain  -     X-Ray Foot Complete Left    Plantar fasciitis    Inflammation of foot joint - Left Foot    Evaluated patient long discussion with her about the exam being basically normal as far as any really pathological findings but she does have pain 5th metatarsal head area and 4th interspace.  When I review her x-rays explained her she does have that unusual looking sesamoid but is not unusual see this and use a they are asymptomatic.  I told her if she does not respond to conservative care we would have to consider an MRI of the forefoot to see if this thing lights up hot or whether there is any soft tissue abnormalities that we can not see on x-ray.  We are going to try no barefoot running shoes over-the-counter inserts and anti-inflammatories and see if it calms down.  As explained her she is may have inflammation hopefully this will calm down if she is doing well in the next 4-6 weeks she will not return to see us she still having problems she return for possible MRI.  Also  there is a possibility of injection of cortisone if need be into the area.  Follow up if symptoms worsen or fail to improve.    Procedures          Counseling:     I provided patient education verbally regarding:   Patient diagnosis, treatment options, as well as alternatives, risks, and benefits.     I explained what joint inflammation is and  conservative treatment of off loading the joint with OTC inserts and pads vs custom made orthotics.  The use of ice, heat, oral antiinflammatory and topical antiinflammatory and shoe modification as well as rest of the affected area with decrease walking, standing and non-impact exercising.   This note was created using Dragon voice recognition software that occasionally misinterpreted phrases or words.

## 2023-04-11 ENCOUNTER — PATIENT MESSAGE (OUTPATIENT)
Dept: FAMILY MEDICINE | Facility: CLINIC | Age: 53
End: 2023-04-11

## 2023-04-11 ENCOUNTER — TELEPHONE (OUTPATIENT)
Dept: FAMILY MEDICINE | Facility: CLINIC | Age: 53
End: 2023-04-11

## 2023-04-11 DIAGNOSIS — R00.2 PALPITATIONS: ICD-10-CM

## 2023-04-11 DIAGNOSIS — K21.9 GASTROESOPHAGEAL REFLUX DISEASE WITHOUT ESOPHAGITIS: ICD-10-CM

## 2023-04-11 DIAGNOSIS — E06.3 HASHIMOTO'S THYROIDITIS: Primary | ICD-10-CM

## 2023-04-11 DIAGNOSIS — E55.9 VITAMIN D INSUFFICIENCY: ICD-10-CM

## 2023-04-18 ENCOUNTER — OFFICE VISIT (OUTPATIENT)
Dept: FAMILY MEDICINE | Facility: CLINIC | Age: 53
End: 2023-04-18
Payer: COMMERCIAL

## 2023-04-18 VITALS
OXYGEN SATURATION: 99 % | BODY MASS INDEX: 28.51 KG/M2 | TEMPERATURE: 99 F | WEIGHT: 167 LBS | DIASTOLIC BLOOD PRESSURE: 80 MMHG | SYSTOLIC BLOOD PRESSURE: 120 MMHG | HEIGHT: 64 IN | HEART RATE: 83 BPM

## 2023-04-18 DIAGNOSIS — K21.9 GASTROESOPHAGEAL REFLUX DISEASE WITHOUT ESOPHAGITIS: ICD-10-CM

## 2023-04-18 DIAGNOSIS — E66.3 OVERWEIGHT (BMI 25.0-29.9): ICD-10-CM

## 2023-04-18 DIAGNOSIS — E55.9 VITAMIN D INSUFFICIENCY: ICD-10-CM

## 2023-04-18 DIAGNOSIS — E06.3 HASHIMOTO'S THYROIDITIS: Primary | ICD-10-CM

## 2023-04-18 DIAGNOSIS — F41.8 MIXED ANXIETY DEPRESSIVE DISORDER: ICD-10-CM

## 2023-04-18 DIAGNOSIS — E78.2 MIXED HYPERLIPIDEMIA: ICD-10-CM

## 2023-04-18 LAB
25(OH)D3 SERPL-MCNC: 65 NG/ML (ref 30–100)
ALBUMIN SERPL-MCNC: 4.1 G/DL (ref 3.6–5.1)
ALBUMIN/GLOB SERPL: 1.7 (CALC) (ref 1–2.5)
ALP SERPL-CCNC: 56 U/L (ref 37–153)
ALT SERPL-CCNC: 25 U/L (ref 6–29)
APPEARANCE UR: CLEAR
AST SERPL-CCNC: 22 U/L (ref 10–35)
BASOPHILS # BLD AUTO: 19 CELLS/UL (ref 0–200)
BASOPHILS NFR BLD AUTO: 0.3 %
BILIRUB SERPL-MCNC: 0.5 MG/DL (ref 0.2–1.2)
BILIRUB UR QL STRIP: NEGATIVE
BUN SERPL-MCNC: 7 MG/DL (ref 7–25)
BUN/CREAT SERPL: ABNORMAL (CALC) (ref 6–22)
CALCIUM SERPL-MCNC: 8.3 MG/DL (ref 8.6–10.4)
CHLORIDE SERPL-SCNC: 100 MMOL/L (ref 98–110)
CO2 SERPL-SCNC: 27 MMOL/L (ref 20–32)
COLOR UR: YELLOW
CREAT SERPL-MCNC: 0.72 MG/DL (ref 0.5–1.03)
EGFR: 102 ML/MIN/1.73M2
EOSINOPHIL # BLD AUTO: 151 CELLS/UL (ref 15–500)
EOSINOPHIL NFR BLD AUTO: 2.4 %
ERYTHROCYTE [DISTWIDTH] IN BLOOD BY AUTOMATED COUNT: 12.7 % (ref 11–15)
GLOBULIN SER CALC-MCNC: 2.4 G/DL (CALC) (ref 1.9–3.7)
GLUCOSE SERPL-MCNC: 88 MG/DL (ref 65–99)
GLUCOSE UR QL STRIP: NEGATIVE
HCT VFR BLD AUTO: 41 % (ref 35–45)
HGB BLD-MCNC: 13.8 G/DL (ref 11.7–15.5)
HGB UR QL STRIP: NEGATIVE
KETONES UR QL STRIP: NEGATIVE
LEUKOCYTE ESTERASE UR QL STRIP: NEGATIVE
LYMPHOCYTES # BLD AUTO: 1392 CELLS/UL (ref 850–3900)
LYMPHOCYTES NFR BLD AUTO: 22.1 %
MCH RBC QN AUTO: 32.2 PG (ref 27–33)
MCHC RBC AUTO-ENTMCNC: 33.7 G/DL (ref 32–36)
MCV RBC AUTO: 95.8 FL (ref 80–100)
MONOCYTES # BLD AUTO: 775 CELLS/UL (ref 200–950)
MONOCYTES NFR BLD AUTO: 12.3 %
NEUTROPHILS # BLD AUTO: 3963 CELLS/UL (ref 1500–7800)
NEUTROPHILS NFR BLD AUTO: 62.9 %
NITRITE UR QL STRIP: NEGATIVE
PH UR STRIP: 6 [PH] (ref 5–8)
PLATELET # BLD AUTO: 228 THOUSAND/UL (ref 140–400)
PMV BLD REES-ECKER: 10 FL (ref 7.5–12.5)
POTASSIUM SERPL-SCNC: 3.3 MMOL/L (ref 3.5–5.3)
PROT SERPL-MCNC: 6.5 G/DL (ref 6.1–8.1)
PROT UR QL STRIP: NEGATIVE
RBC # BLD AUTO: 4.28 MILLION/UL (ref 3.8–5.1)
SODIUM SERPL-SCNC: 139 MMOL/L (ref 135–146)
SP GR UR STRIP: 1.01 (ref 1–1.03)
T4 SERPL-MCNC: 10.3 MCG/DL (ref 5.1–11.9)
TSH SERPL-ACNC: 1.73 MIU/L
WBC # BLD AUTO: 6.3 THOUSAND/UL (ref 3.8–10.8)

## 2023-04-18 PROCEDURE — 3079F DIAST BP 80-89 MM HG: CPT | Mod: CPTII,S$GLB,, | Performed by: NURSE PRACTITIONER

## 2023-04-18 PROCEDURE — 1159F PR MEDICATION LIST DOCUMENTED IN MEDICAL RECORD: ICD-10-PCS | Mod: CPTII,S$GLB,, | Performed by: NURSE PRACTITIONER

## 2023-04-18 PROCEDURE — 3074F SYST BP LT 130 MM HG: CPT | Mod: CPTII,S$GLB,, | Performed by: NURSE PRACTITIONER

## 2023-04-18 PROCEDURE — 3008F BODY MASS INDEX DOCD: CPT | Mod: CPTII,S$GLB,, | Performed by: NURSE PRACTITIONER

## 2023-04-18 PROCEDURE — 3008F PR BODY MASS INDEX (BMI) DOCUMENTED: ICD-10-PCS | Mod: CPTII,S$GLB,, | Performed by: NURSE PRACTITIONER

## 2023-04-18 PROCEDURE — 1159F MED LIST DOCD IN RCRD: CPT | Mod: CPTII,S$GLB,, | Performed by: NURSE PRACTITIONER

## 2023-04-18 PROCEDURE — 99214 PR OFFICE/OUTPT VISIT, EST, LEVL IV, 30-39 MIN: ICD-10-PCS | Mod: S$GLB,,, | Performed by: NURSE PRACTITIONER

## 2023-04-18 PROCEDURE — 1160F PR REVIEW ALL MEDS BY PRESCRIBER/CLIN PHARMACIST DOCUMENTED: ICD-10-PCS | Mod: CPTII,S$GLB,, | Performed by: NURSE PRACTITIONER

## 2023-04-18 PROCEDURE — 1160F RVW MEDS BY RX/DR IN RCRD: CPT | Mod: CPTII,S$GLB,, | Performed by: NURSE PRACTITIONER

## 2023-04-18 PROCEDURE — 3074F PR MOST RECENT SYSTOLIC BLOOD PRESSURE < 130 MM HG: ICD-10-PCS | Mod: CPTII,S$GLB,, | Performed by: NURSE PRACTITIONER

## 2023-04-18 PROCEDURE — 99214 OFFICE O/P EST MOD 30 MIN: CPT | Mod: S$GLB,,, | Performed by: NURSE PRACTITIONER

## 2023-04-18 PROCEDURE — 3079F PR MOST RECENT DIASTOLIC BLOOD PRESSURE 80-89 MM HG: ICD-10-PCS | Mod: CPTII,S$GLB,, | Performed by: NURSE PRACTITIONER

## 2023-04-18 NOTE — PROGRESS NOTES
Subjective:       Patient ID: Riddhi Thapa is a 52 y.o. female.    Chief Complaint: Thyroid Problem and Follow-up      Patient presents today following up for her 6 month checkup on chronic conditions including anxiety, depressed mood, hyperlipidemia, GERD and hypothyroidism.  Patient is tolerating current medication regimen well with no side effects.  Patient has had her labs completed and will be reviewed at today's office visit.  Labs are within acceptable range in stable.  She is overweight with a BMI of 28.67      Hyperlipidemia  This is a recurrent problem. The current episode started more than 1 month ago. The problem is controlled. Recent lipid tests were reviewed and are variable. Exacerbating diseases include hypothyroidism and obesity. She has no history of chronic renal disease, diabetes, liver disease or nephrotic syndrome. Factors aggravating her hyperlipidemia include fatty foods. Pertinent negatives include no chest pain, focal weakness or leg pain. Current antihyperlipidemic treatment includes diet change and exercise. The current treatment provides significant improvement of lipids. Compliance problems include adherence to diet and adherence to exercise.  Risk factors for coronary artery disease include stress, dyslipidemia and obesity.   Thyroid Problem  Presents for follow-up visit. Symptoms include palpitations. Patient reports no cold intolerance, constipation, diarrhea, dry skin, hair loss, heat intolerance, hoarse voice, menstrual problem, nail problem or tremors. The symptoms have been stable. Her past medical history is significant for hyperlipidemia. There is no history of diabetes.   Anxiety  Presents for follow-up visit. Symptoms include palpitations. Patient reports no chest pain or confusion. Symptoms occur occasionally. The severity of symptoms is mild. The quality of sleep is fair. Nighttime awakenings: occasional.     Compliance with medications is %.   Gastroesophageal  Reflux  She complains of globus sensation and heartburn. She reports no chest pain, no hoarse voice or no wheezing. This is a chronic problem. The current episode started more than 1 month ago. The problem has been waxing and waning. The heartburn duration is several minutes. The heartburn is located in the substernum. The heartburn is of severe intensity. The heartburn wakes her from sleep. The heartburn limits her activity. The heartburn changes with position. The symptoms are aggravated by certain foods and exertion. Pertinent negatives include no melena or muscle weakness. There are no known risk factors. She has tried a PPI for the symptoms. The treatment provided moderate relief. Past procedures do not include an abdominal ultrasound.   Review of Systems   Constitutional:  Negative for activity change, appetite change and unexpected weight change.        Obesity   HENT:  Negative for ear discharge, ear pain, hearing loss, hoarse voice, rhinorrhea, trouble swallowing and voice change.    Eyes:  Negative for photophobia, pain, discharge and visual disturbance.   Respiratory:  Negative for chest tightness and wheezing.    Cardiovascular:  Positive for palpitations. Negative for chest pain.        Mild hyperlipidemia   Gastrointestinal:  Positive for heartburn. Negative for blood in stool, constipation, diarrhea and melena.        Gerd   Endocrine: Negative for cold intolerance, heat intolerance, polydipsia and polyuria.        Hypothyroidism   Genitourinary:  Negative for difficulty urinating, dysuria, frequency, hematuria, menstrual problem and urgency.   Musculoskeletal:  Negative for gait problem and muscle weakness.   Allergic/Immunologic: Negative for immunocompromised state.   Neurological:  Negative for tremors, focal weakness and speech difficulty.   Psychiatric/Behavioral:  Negative for confusion, dysphoric mood and self-injury.      Past Medical History:   Diagnosis Date    Anxiety     Asthma      Depression     GERD (gastroesophageal reflux disease)     Hashimoto's thyroiditis     Hyperlipidemia     Hypothyroidism     Kidney stones       Past Surgical History:   Procedure Laterality Date    EXTRACORPOREAL SHOCK WAVE LITHOTRIPSY  2011    HYSTERECTOMY      LITHOTRIPSY      SINUS SURGERY  2015    TUBAL LIGATION      Bi-Lat       Family History   Problem Relation Age of Onset    Eczema Mother     Thyroid disease Mother     Cancer Father         Renal    Eczema Father     COPD Maternal Grandmother     Diabetes Maternal Grandmother     Stroke Maternal Grandmother     Thyroid disease Maternal Grandmother     Breast cancer Paternal Aunt     Asthma Neg Hx     Immunodeficiency Neg Hx     Rhinitis Neg Hx        Social History     Socioeconomic History    Marital status:    Occupational History    Occupation: Teacher     Comment: [employer]   Tobacco Use    Smoking status: Never    Smokeless tobacco: Never   Substance and Sexual Activity    Alcohol use: Yes     Comment: weekends only    Drug use: No    Sexual activity: Yes     Partners: Male     Social Determinants of Health     Financial Resource Strain: Unknown    Difficulty of Paying Living Expenses: Patient refused   Food Insecurity: Unknown    Worried About Running Out of Food in the Last Year: Patient refused   Transportation Needs: Unknown    Lack of Transportation (Medical): Patient refused    Lack of Transportation (Non-Medical): Patient refused   Physical Activity: Insufficiently Active    Days of Exercise per Week: 3 days    Minutes of Exercise per Session: 30 min   Stress: No Stress Concern Present    Feeling of Stress : Only a little   Social Connections: Unknown    Frequency of Communication with Friends and Family: Patient refused    Frequency of Social Gatherings with Friends and Family: Patient refused    Active Member of Clubs or Organizations: Patient refused    Attends Club or Organization Meetings: Patient refused    Marital Status:  "   Housing Stability: Unknown    Unable to Pay for Housing in the Last Year: Patient refused    Unstable Housing in the Last Year: Patient refused       Current Outpatient Medications   Medication Sig Dispense Refill    albuterol (PROVENTIL/VENTOLIN HFA) 90 mcg/actuation inhaler INHALE TWO PUFFS BY MOUTH EVERY SIX HOURS AS NEEDED FOR WHEEZING 18 g 2    azelastine-fluticasone (DYMISTA) 137-50 mcg/spray Spry nassal spray 1 spray by Each Nostril route 2 (two) times daily. 3 Bottle 1    cetirizine 10 mg Cap Take 1 capsule by mouth daily as needed.      cholecalciferol, vitamin D3, (VITAMIN D3) 50 mcg (2,000 unit) Cap Take 1 capsule by mouth once daily.      dexlansoprazole (DEXILANT) 60 mg capsule Take 1 capsule (60 mg total) by mouth every morning. 90 capsule 3    dextroamphetamine-amphetamine (ADDERALL XR) 30 MG 24 hr capsule Take 30 mg by mouth every morning.      ENBRACE HR 1.5 mg iron- 8.73 mg-6.4 mg capsule       peak flow meter (PERSONAL BEST FULL RANGE) Claudine To be used as directed by MD 1 each 0    SYMBICORT 160-4.5 mcg/actuation HFAA INHALE TWO PUFFS BY MOUTH EVERY TWELVE HOURS 11 g 3    SYNTHROID 50 mcg tablet Take 1 tablet (50 mcg total) by mouth before breakfast. 90 tablet 1    valACYclovir (VALTREX) 1000 MG tablet 2 tablets PO at onset of fever blister. Repeat 2 tablets PO in 12 hours. Repeat for recurrent outbreaks. 90 tablet 1     Current Facility-Administered Medications   Medication Dose Route Frequency Provider Last Rate Last Admin    Allergy Mix   Subcutaneous Q30 Days Samara Jones MD   Given at 09/13/22 1100       Review of patient's allergies indicates:   Allergen Reactions    Nsaids (non-steroidal anti-inflammatory drug)      causes upset stomach, has GERD     Objective:      Blood pressure 120/80, pulse 83, temperature 99.1 °F (37.3 °C), height 5' 4" (1.626 m), weight 75.8 kg (167 lb), last menstrual period 09/16/2012, SpO2 99 %. Body mass index is 28.67 kg/m².   Physical Exam  Vitals " and nursing note reviewed.   Constitutional:       General: She is not in acute distress.     Appearance: Normal appearance. She is well-developed. She is not ill-appearing.   HENT:      Head: Normocephalic and atraumatic.      Right Ear: Tympanic membrane, ear canal and external ear normal.      Left Ear: Tympanic membrane, ear canal and external ear normal.      Nose: Nose normal.      Mouth/Throat:      Mouth: Mucous membranes are moist.   Eyes:      General: Lids are normal. Lids are everted, no foreign bodies appreciated.      Conjunctiva/sclera: Conjunctivae normal.      Pupils: Pupils are equal, round, and reactive to light.      Right eye: Pupil is round and reactive.      Left eye: Pupil is round and reactive.   Neck:      Trachea: Trachea normal.   Cardiovascular:      Rate and Rhythm: Normal rate and regular rhythm.      Pulses: Normal pulses.      Heart sounds: Normal heart sounds, S1 normal and S2 normal. No murmur heard.  Pulmonary:      Effort: Pulmonary effort is normal. No respiratory distress.      Breath sounds: Normal breath sounds.   Abdominal:      General: Abdomen is flat. Bowel sounds are normal.      Palpations: Abdomen is soft. Abdomen is not rigid.      Tenderness: There is no guarding.   Musculoskeletal:         General: Normal range of motion.      Cervical back: Normal range of motion and neck supple. No muscular tenderness.   Lymphadenopathy:      Cervical: No cervical adenopathy.   Skin:     General: Skin is warm and dry.      Capillary Refill: Capillary refill takes less than 2 seconds.   Neurological:      General: No focal deficit present.      Mental Status: She is alert and oriented to person, place, and time. Mental status is at baseline.      Cranial Nerves: No cranial nerve deficit.   Psychiatric:         Mood and Affect: Mood normal.         Behavior: Behavior normal. Behavior is cooperative.         Thought Content: Thought content normal.         Judgment: Judgment normal.            Assessment:       1. Hashimoto's thyroiditis    2. Vitamin D insufficiency    3. Gastroesophageal reflux disease without esophagitis    4. Mixed hyperlipidemia    5. Mixed anxiety depressive disorder    6. Overweight (BMI 25.0-29.9)        Plan:       Riddhi was seen today for thyroid problem and follow-up.    Diagnoses and all orders for this visit:    Hashimoto's thyroiditis  -     T4, Free; Future  -     TSH; Future  -     T4, Free  -     TSH    Stable  Continue current levothyroxine    Vitamin D insufficiency  -     Vitamin D; Future  -     Vitamin D    Gastroesophageal reflux disease without esophagitis  -     Comprehensive Metabolic Panel; Future  -     Urinalysis; Future  -     Comprehensive Metabolic Panel  -     Urinalysis    Mixed hyperlipidemia  -     Lipid Panel; Future  -     Lipid Panel    Limit red meat, butter, fried foods, cheese, and other foods that have a lot of saturated fat. Consume more: lean meats, fish, fruits, vegetables, whole grains, beans, lentils, and nuts.  Weight loss, and 30-45 min of cardiovascular exercise daily.      Mixed anxiety depressive disorder  -     CBC Auto Differential; Future  -     Urinalysis; Future  -     CBC Auto Differential  -     Urinalysis    Stable.  Continue current medication    Overweight (BMI 25.0-29.9)  The patient is asked to make an attempt to improve diet and exercise patterns to aid in medical management of this problem.        Follow up in 6 months with labs prior to office visit for chronic condition management.

## 2023-05-08 DIAGNOSIS — E06.3 HASHIMOTO'S THYROIDITIS: ICD-10-CM

## 2023-05-08 RX ORDER — LEVOTHYROXINE SODIUM 50 UG/1
TABLET ORAL
Qty: 90 TABLET | Refills: 3 | Status: SHIPPED | OUTPATIENT
Start: 2023-05-08

## 2023-09-05 DIAGNOSIS — Z12.31 ENCOUNTER FOR SCREENING MAMMOGRAM FOR MALIGNANT NEOPLASM OF BREAST: Primary | ICD-10-CM

## 2023-09-13 ENCOUNTER — HOSPITAL ENCOUNTER (OUTPATIENT)
Dept: RADIOLOGY | Facility: HOSPITAL | Age: 53
Discharge: HOME | End: 2023-09-13
Attending: NURSE PRACTITIONER
Payer: COMMERCIAL

## 2023-09-13 DIAGNOSIS — Z12.31 ENCOUNTER FOR SCREENING MAMMOGRAM FOR MALIGNANT NEOPLASM OF BREAST: ICD-10-CM

## 2023-09-13 PROCEDURE — 77067 SCR MAMMO BI INCL CAD: CPT | Mod: TC,PO

## 2023-09-28 DIAGNOSIS — J30.1 NON-SEASONAL ALLERGIC RHINITIS DUE TO POLLEN: ICD-10-CM

## 2023-09-28 RX ORDER — AZELASTINE HYDROCHLORIDE, FLUTICASONE PROPIONATE 137; 50 UG/1; UG/1
1 SPRAY, METERED NASAL 2 TIMES DAILY
Qty: 3 EACH | Refills: 1 | Status: SHIPPED | OUTPATIENT
Start: 2023-09-28

## 2023-10-11 ENCOUNTER — PATIENT MESSAGE (OUTPATIENT)
Dept: FAMILY MEDICINE | Facility: CLINIC | Age: 53
End: 2023-10-11

## 2023-10-13 ENCOUNTER — HOSPITAL ENCOUNTER (OUTPATIENT)
Dept: RADIOLOGY | Facility: HOSPITAL | Age: 53
Discharge: HOME | End: 2023-10-13
Attending: NURSE PRACTITIONER
Payer: COMMERCIAL

## 2023-10-13 DIAGNOSIS — R92.8 ABNORMAL MAMMOGRAM: ICD-10-CM

## 2023-10-13 PROCEDURE — 77065 DX MAMMO INCL CAD UNI: CPT | Mod: TC,PO,LT

## 2023-10-14 LAB
25(OH)D3 SERPL-MCNC: 66 NG/ML (ref 30–100)
ALBUMIN SERPL-MCNC: 4.3 G/DL (ref 3.6–5.1)
ALBUMIN/GLOB SERPL: 1.9 (CALC) (ref 1–2.5)
ALP SERPL-CCNC: 59 U/L (ref 37–153)
ALT SERPL-CCNC: 8 U/L (ref 6–29)
APPEARANCE UR: CLEAR
AST SERPL-CCNC: 11 U/L (ref 10–35)
BASOPHILS # BLD AUTO: 36 CELLS/UL (ref 0–200)
BASOPHILS NFR BLD AUTO: 0.4 %
BILIRUB SERPL-MCNC: 0.4 MG/DL (ref 0.2–1.2)
BILIRUB UR QL STRIP: NEGATIVE
BUN SERPL-MCNC: 13 MG/DL (ref 7–25)
BUN/CREAT SERPL: NORMAL (CALC) (ref 6–22)
CALCIUM SERPL-MCNC: 9.6 MG/DL (ref 8.6–10.4)
CHLORIDE SERPL-SCNC: 104 MMOL/L (ref 98–110)
CHOLEST SERPL-MCNC: 207 MG/DL
CHOLEST/HDLC SERPL: 2.7 (CALC)
CO2 SERPL-SCNC: 28 MMOL/L (ref 20–32)
COLOR UR: YELLOW
CREAT SERPL-MCNC: 0.69 MG/DL (ref 0.5–1.03)
EGFR: 105 ML/MIN/1.73M2
EOSINOPHIL # BLD AUTO: 582 CELLS/UL (ref 15–500)
EOSINOPHIL NFR BLD AUTO: 6.4 %
ERYTHROCYTE [DISTWIDTH] IN BLOOD BY AUTOMATED COUNT: 11.9 % (ref 11–15)
GLOBULIN SER CALC-MCNC: 2.3 G/DL (CALC) (ref 1.9–3.7)
GLUCOSE SERPL-MCNC: 88 MG/DL (ref 65–99)
GLUCOSE UR QL STRIP: NEGATIVE
HCT VFR BLD AUTO: 42.4 % (ref 35–45)
HDLC SERPL-MCNC: 77 MG/DL
HGB BLD-MCNC: 13.8 G/DL (ref 11.7–15.5)
HGB UR QL STRIP: NEGATIVE
KETONES UR QL STRIP: NEGATIVE
LDLC SERPL CALC-MCNC: 111 MG/DL (CALC)
LEUKOCYTE ESTERASE UR QL STRIP: NEGATIVE
LYMPHOCYTES # BLD AUTO: 2466 CELLS/UL (ref 850–3900)
LYMPHOCYTES NFR BLD AUTO: 27.1 %
MCH RBC QN AUTO: 31.5 PG (ref 27–33)
MCHC RBC AUTO-ENTMCNC: 32.5 G/DL (ref 32–36)
MCV RBC AUTO: 96.8 FL (ref 80–100)
MONOCYTES # BLD AUTO: 819 CELLS/UL (ref 200–950)
MONOCYTES NFR BLD AUTO: 9 %
NEUTROPHILS # BLD AUTO: 5196 CELLS/UL (ref 1500–7800)
NEUTROPHILS NFR BLD AUTO: 57.1 %
NITRITE UR QL STRIP: NEGATIVE
NONHDLC SERPL-MCNC: 130 MG/DL (CALC)
PH UR STRIP: 6.5 [PH] (ref 5–8)
PLATELET # BLD AUTO: 287 THOUSAND/UL (ref 140–400)
PMV BLD REES-ECKER: 11 FL (ref 7.5–12.5)
POTASSIUM SERPL-SCNC: 4.6 MMOL/L (ref 3.5–5.3)
PROT SERPL-MCNC: 6.6 G/DL (ref 6.1–8.1)
PROT UR QL STRIP: NEGATIVE
RBC # BLD AUTO: 4.38 MILLION/UL (ref 3.8–5.1)
SODIUM SERPL-SCNC: 140 MMOL/L (ref 135–146)
SP GR UR STRIP: 1.02 (ref 1–1.03)
T4 FREE SERPL-MCNC: 1.2 NG/DL (ref 0.8–1.8)
TRIGL SERPL-MCNC: 89 MG/DL
TSH SERPL-ACNC: 1.46 MIU/L
WBC # BLD AUTO: 9.1 THOUSAND/UL (ref 3.8–10.8)

## 2023-10-23 ENCOUNTER — PATIENT MESSAGE (OUTPATIENT)
Dept: FAMILY MEDICINE | Facility: CLINIC | Age: 53
End: 2023-10-23

## 2023-10-24 ENCOUNTER — PATIENT MESSAGE (OUTPATIENT)
Dept: FAMILY MEDICINE | Facility: CLINIC | Age: 53
End: 2023-10-24

## 2023-10-26 ENCOUNTER — OFFICE VISIT (OUTPATIENT)
Dept: FAMILY MEDICINE | Facility: CLINIC | Age: 53
End: 2023-10-26
Payer: COMMERCIAL

## 2023-10-26 VITALS
WEIGHT: 168 LBS | DIASTOLIC BLOOD PRESSURE: 80 MMHG | OXYGEN SATURATION: 99 % | BODY MASS INDEX: 28.68 KG/M2 | TEMPERATURE: 98 F | HEIGHT: 64 IN | SYSTOLIC BLOOD PRESSURE: 128 MMHG | HEART RATE: 76 BPM

## 2023-10-26 DIAGNOSIS — E55.9 VITAMIN D INSUFFICIENCY: ICD-10-CM

## 2023-10-26 DIAGNOSIS — K21.9 GASTROESOPHAGEAL REFLUX DISEASE WITHOUT ESOPHAGITIS: ICD-10-CM

## 2023-10-26 DIAGNOSIS — E78.2 MIXED HYPERLIPIDEMIA: ICD-10-CM

## 2023-10-26 DIAGNOSIS — E06.3 HASHIMOTO'S THYROIDITIS: Primary | ICD-10-CM

## 2023-10-26 DIAGNOSIS — F41.8 MIXED ANXIETY DEPRESSIVE DISORDER: ICD-10-CM

## 2023-10-26 DIAGNOSIS — E66.3 OVERWEIGHT (BMI 25.0-29.9): ICD-10-CM

## 2023-10-26 DIAGNOSIS — J45.41 MODERATE PERSISTENT ASTHMA WITH ACUTE EXACERBATION: ICD-10-CM

## 2023-10-26 PROCEDURE — 1159F MED LIST DOCD IN RCRD: CPT | Mod: CPTII,S$GLB,, | Performed by: NURSE PRACTITIONER

## 2023-10-26 PROCEDURE — 90471 FLU VACCINE (QUAD) GREATER THAN OR EQUAL TO 3YO PRESERVATIVE FREE IM: ICD-10-PCS | Mod: S$GLB,,, | Performed by: NURSE PRACTITIONER

## 2023-10-26 PROCEDURE — 90686 IIV4 VACC NO PRSV 0.5 ML IM: CPT | Mod: S$GLB,,, | Performed by: NURSE PRACTITIONER

## 2023-10-26 PROCEDURE — 3074F PR MOST RECENT SYSTOLIC BLOOD PRESSURE < 130 MM HG: ICD-10-PCS | Mod: CPTII,S$GLB,, | Performed by: NURSE PRACTITIONER

## 2023-10-26 PROCEDURE — 90686 FLU VACCINE (QUAD) GREATER THAN OR EQUAL TO 3YO PRESERVATIVE FREE IM: ICD-10-PCS | Mod: S$GLB,,, | Performed by: NURSE PRACTITIONER

## 2023-10-26 PROCEDURE — 99214 OFFICE O/P EST MOD 30 MIN: CPT | Mod: 25,S$GLB,, | Performed by: NURSE PRACTITIONER

## 2023-10-26 PROCEDURE — 3079F PR MOST RECENT DIASTOLIC BLOOD PRESSURE 80-89 MM HG: ICD-10-PCS | Mod: CPTII,S$GLB,, | Performed by: NURSE PRACTITIONER

## 2023-10-26 PROCEDURE — 99214 PR OFFICE/OUTPT VISIT, EST, LEVL IV, 30-39 MIN: ICD-10-PCS | Mod: 25,S$GLB,, | Performed by: NURSE PRACTITIONER

## 2023-10-26 PROCEDURE — 90471 IMMUNIZATION ADMIN: CPT | Mod: S$GLB,,, | Performed by: NURSE PRACTITIONER

## 2023-10-26 PROCEDURE — 1160F PR REVIEW ALL MEDS BY PRESCRIBER/CLIN PHARMACIST DOCUMENTED: ICD-10-PCS | Mod: CPTII,S$GLB,, | Performed by: NURSE PRACTITIONER

## 2023-10-26 PROCEDURE — 3008F BODY MASS INDEX DOCD: CPT | Mod: CPTII,S$GLB,, | Performed by: NURSE PRACTITIONER

## 2023-10-26 PROCEDURE — 3074F SYST BP LT 130 MM HG: CPT | Mod: CPTII,S$GLB,, | Performed by: NURSE PRACTITIONER

## 2023-10-26 PROCEDURE — 1159F PR MEDICATION LIST DOCUMENTED IN MEDICAL RECORD: ICD-10-PCS | Mod: CPTII,S$GLB,, | Performed by: NURSE PRACTITIONER

## 2023-10-26 PROCEDURE — 3008F PR BODY MASS INDEX (BMI) DOCUMENTED: ICD-10-PCS | Mod: CPTII,S$GLB,, | Performed by: NURSE PRACTITIONER

## 2023-10-26 PROCEDURE — 1160F RVW MEDS BY RX/DR IN RCRD: CPT | Mod: CPTII,S$GLB,, | Performed by: NURSE PRACTITIONER

## 2023-10-26 PROCEDURE — 3079F DIAST BP 80-89 MM HG: CPT | Mod: CPTII,S$GLB,, | Performed by: NURSE PRACTITIONER

## 2023-10-26 RX ORDER — BUSPIRONE HYDROCHLORIDE 5 MG/1
TABLET ORAL
COMMUNITY
Start: 2023-09-26

## 2023-10-26 RX ORDER — DEXLANSOPRAZOLE 60 MG/1
60 CAPSULE, DELAYED RELEASE ORAL EVERY MORNING
Qty: 90 CAPSULE | Refills: 3 | Status: SHIPPED | OUTPATIENT
Start: 2023-10-26 | End: 2024-10-25

## 2023-10-26 RX ORDER — BUDESONIDE AND FORMOTEROL FUMARATE DIHYDRATE 160; 4.5 UG/1; UG/1
AEROSOL RESPIRATORY (INHALATION)
Qty: 11 G | Refills: 3 | Status: SHIPPED | OUTPATIENT
Start: 2023-10-26

## 2023-10-26 NOTE — PROGRESS NOTES
Subjective:       Patient ID: Riddhi Thapa is a 53 y.o. female.    Chief Complaint: Thyroid Problem and Follow-up      Patient presents today following up for her 6 month checkup on chronic conditions including anxiety, depressed mood, hyperlipidemia, GERD and hypothyroidism.  Patient is tolerating current medication regimen well with no side effects.  Patient has had her labs completed and will be reviewed at today's office visit.  Labs are within acceptable range in stable. She is emotional today with the recent diagnosis of pancreatic cancer in her younger sister. They have a family history of pancreatic cancer in her grandmother. She is seeing psychiatry and recently obtained buspar for anxiety.  She is overweight with a BMI of 28.84      Hyperlipidemia  This is a recurrent problem. The current episode started more than 1 month ago. The problem is controlled. Recent lipid tests were reviewed and are variable. Exacerbating diseases include hypothyroidism and obesity. She has no history of chronic renal disease, diabetes, liver disease or nephrotic syndrome. Factors aggravating her hyperlipidemia include fatty foods. Pertinent negatives include no chest pain, focal weakness or leg pain. Current antihyperlipidemic treatment includes diet change and exercise. The current treatment provides significant improvement of lipids. Compliance problems include adherence to diet and adherence to exercise.  Risk factors for coronary artery disease include stress, dyslipidemia and obesity.   Thyroid Problem  Presents for follow-up visit. Symptoms include palpitations. Patient reports no cold intolerance, constipation, diarrhea, dry skin, hair loss, heat intolerance, hoarse voice, menstrual problem, nail problem or tremors. The symptoms have been stable. Her past medical history is significant for hyperlipidemia. There is no history of diabetes.   Anxiety  Presents for follow-up visit. Symptoms include palpitations. Patient  reports no chest pain or confusion. Symptoms occur occasionally. The severity of symptoms is moderate. The quality of sleep is fair. Nighttime awakenings: occasional.     Compliance with medications is %.   Gastroesophageal Reflux  She complains of globus sensation and heartburn. She reports no chest pain, no hoarse voice or no wheezing. This is a chronic problem. The current episode started more than 1 month ago. The problem has been waxing and waning. The heartburn duration is several minutes. The heartburn is located in the substernum. The heartburn is of severe intensity. The heartburn wakes her from sleep. The heartburn limits her activity. The heartburn changes with position. The symptoms are aggravated by certain foods and exertion. Pertinent negatives include no melena or muscle weakness. There are no known risk factors. She has tried a PPI for the symptoms. The treatment provided moderate relief. Past procedures do not include an abdominal ultrasound.     Review of Systems   Constitutional:  Negative for activity change, appetite change and unexpected weight change.        Obesity   HENT:  Negative for ear discharge, ear pain, hearing loss, hoarse voice, rhinorrhea, trouble swallowing and voice change.    Eyes:  Negative for photophobia, pain, discharge and visual disturbance.   Respiratory:  Negative for chest tightness and wheezing.    Cardiovascular:  Positive for palpitations. Negative for chest pain.        Mild hyperlipidemia   Gastrointestinal:  Positive for heartburn. Negative for blood in stool, constipation, diarrhea, melena and vomiting.        Gerd   Endocrine: Negative for cold intolerance, heat intolerance, polydipsia and polyuria.        Hypothyroidism   Genitourinary:  Negative for difficulty urinating, dysuria, frequency, hematuria, menstrual problem and urgency.   Musculoskeletal:  Negative for arthralgias, gait problem, joint swelling, muscle weakness and neck pain.    Allergic/Immunologic: Negative for immunocompromised state.   Neurological:  Negative for tremors, focal weakness, speech difficulty, weakness and headaches.   Psychiatric/Behavioral:  Negative for confusion, dysphoric mood and self-injury.        Past Medical History:   Diagnosis Date    Anxiety     Asthma     Depression     GERD (gastroesophageal reflux disease)     Hashimoto's thyroiditis     Hyperlipidemia     Hypothyroidism     Kidney stones       Past Surgical History:   Procedure Laterality Date    EXTRACORPOREAL SHOCK WAVE LITHOTRIPSY  2011    HYSTERECTOMY      LITHOTRIPSY      SINUS SURGERY  2015    TUBAL LIGATION      Bi-Lat       Family History   Problem Relation Age of Onset    Eczema Mother     Thyroid disease Mother     Cancer Father         Renal    Eczema Father     COPD Maternal Grandmother     Diabetes Maternal Grandmother     Stroke Maternal Grandmother     Thyroid disease Maternal Grandmother     Breast cancer Paternal Aunt     Asthma Neg Hx     Immunodeficiency Neg Hx     Rhinitis Neg Hx        Social History     Socioeconomic History    Marital status:    Occupational History    Occupation: Teacher     Comment: [employer]   Tobacco Use    Smoking status: Never    Smokeless tobacco: Never   Substance and Sexual Activity    Alcohol use: Yes     Comment: weekends only    Drug use: No    Sexual activity: Yes     Partners: Male     Social Determinants of Health     Financial Resource Strain: Unknown (10/24/2023)    Overall Financial Resource Strain (CARDIA)     Difficulty of Paying Living Expenses: Patient refused   Food Insecurity: Unknown (10/24/2023)    Hunger Vital Sign     Worried About Running Out of Food in the Last Year: Patient refused     Ran Out of Food in the Last Year: Patient refused   Transportation Needs: Unknown (10/24/2023)    PRAPARE - Transportation     Lack of Transportation (Medical): Patient refused     Lack of Transportation (Non-Medical): Patient refused    Physical Activity: Insufficiently Active (10/24/2023)    Exercise Vital Sign     Days of Exercise per Week: 3 days     Minutes of Exercise per Session: 40 min   Stress: Stress Concern Present (10/24/2023)    [employer] [place] [employer] - Occupational Stress Questionnaire     Feeling of Stress : Very much   Social Connections: Unknown (10/24/2023)    Social Connection and Isolation Panel [NHANES]     Frequency of Communication with Friends and Family: Patient refused     Frequency of Social Gatherings with Friends and Family: Patient refused     Active Member of Clubs or Organizations: Patient refused     Attends Club or Organization Meetings: Patient refused     Marital Status:    Housing Stability: Unknown (10/24/2023)    Housing Stability Vital Sign     Unable to Pay for Housing in the Last Year: No     Unstable Housing in the Last Year: No       Current Outpatient Medications   Medication Sig Dispense Refill    albuterol (PROVENTIL/VENTOLIN HFA) 90 mcg/actuation inhaler INHALE TWO PUFFS BY MOUTH EVERY SIX HOURS AS NEEDED FOR WHEEZING 18 g 2    azelastine-fluticasone (DYMISTA) 137-50 mcg/spray Spry nassal spray 1 spray by Each Nostril route 2 (two) times daily. 3 each 1    cetirizine 10 mg Cap Take 1 capsule by mouth daily as needed.      cholecalciferol, vitamin D3, (VITAMIN D3) 50 mcg (2,000 unit) Cap Take 1 capsule by mouth once daily.      dextroamphetamine-amphetamine (ADDERALL XR) 30 MG 24 hr capsule Take 30 mg by mouth every morning.      ENBRACE HR 1.5 mg iron- 8.73 mg-6.4 mg capsule       peak flow meter (PERSONAL BEST FULL RANGE) Claudine To be used as directed by MD 1 each 0    SYNTHROID 50 mcg tablet TAKE 1 TABLET BEFORE BREAKFAST 90 tablet 3    valACYclovir (VALTREX) 1000 MG tablet 2 tablets PO at onset of fever blister. Repeat 2 tablets PO in 12 hours. Repeat for recurrent outbreaks. 90 tablet 1    budesonide-formoterol 160-4.5 mcg (SYMBICORT) 160-4.5 mcg/actuation HFAA INHALE TWO  "PUFFS BY MOUTH EVERY TWELVE HOURS 11 g 3    busPIRone (BUSPAR) 5 MG Tab TAKE 1 TAB BY MOUTH 3 TIMES A DAY (EVERY 8 HOURS) AS NEEDED FOR ANXIETY*MAY CAUSE SEDATION/DIZZINESS      dexlansoprazole (DEXILANT) 60 mg capsule Take 1 capsule (60 mg total) by mouth every morning. 90 capsule 3     Current Facility-Administered Medications   Medication Dose Route Frequency Provider Last Rate Last Admin    Allergy Mix   Subcutaneous Q30 Days Samara Jones MD   Given at 09/13/22 1100       Review of patient's allergies indicates:   Allergen Reactions    Nsaids (non-steroidal anti-inflammatory drug)      causes upset stomach, has GERD     Objective:      Blood pressure 128/80, pulse 76, temperature 98 °F (36.7 °C), height 5' 4" (1.626 m), weight 76.2 kg (168 lb), last menstrual period 09/16/2012, SpO2 99 %. Body mass index is 28.84 kg/m².   Physical Exam  Vitals and nursing note reviewed.   Constitutional:       General: She is not in acute distress.     Appearance: Normal appearance. She is well-developed. She is not ill-appearing.   HENT:      Head: Normocephalic and atraumatic.      Right Ear: Tympanic membrane, ear canal and external ear normal.      Left Ear: Tympanic membrane, ear canal and external ear normal.      Nose: Nose normal.      Mouth/Throat:      Mouth: Mucous membranes are moist.   Eyes:      General: Lids are normal. Lids are everted, no foreign bodies appreciated.      Conjunctiva/sclera: Conjunctivae normal.      Pupils: Pupils are equal, round, and reactive to light.      Right eye: Pupil is round and reactive.      Left eye: Pupil is round and reactive.   Neck:      Trachea: Trachea normal.   Cardiovascular:      Rate and Rhythm: Normal rate and regular rhythm.      Pulses: Normal pulses.      Heart sounds: Normal heart sounds, S1 normal and S2 normal. No murmur heard.  Pulmonary:      Effort: Pulmonary effort is normal. No respiratory distress.      Breath sounds: Normal breath sounds.   Abdominal: "      General: Abdomen is flat. Bowel sounds are normal.      Palpations: Abdomen is soft. Abdomen is not rigid.      Tenderness: There is no guarding.   Musculoskeletal:         General: Normal range of motion.      Cervical back: Normal range of motion and neck supple. No muscular tenderness.   Lymphadenopathy:      Cervical: No cervical adenopathy.   Skin:     General: Skin is warm and dry.      Capillary Refill: Capillary refill takes less than 2 seconds.   Neurological:      General: No focal deficit present.      Mental Status: She is alert and oriented to person, place, and time. Mental status is at baseline.      Cranial Nerves: No cranial nerve deficit.   Psychiatric:         Mood and Affect: Mood normal.         Behavior: Behavior normal. Behavior is cooperative.         Thought Content: Thought content normal.         Judgment: Judgment normal.             Assessment:       1. Hashimoto's thyroiditis    2. Vitamin D insufficiency    3. Gastroesophageal reflux disease without esophagitis    4. Mixed hyperlipidemia    5. Mixed anxiety depressive disorder    6. Moderate persistent asthma with acute exacerbation    7. Overweight (BMI 25.0-29.9)          Plan:       Riddhi was seen today for thyroid problem and follow-up.    Diagnoses and all orders for this visit:    Hashimoto's thyroiditis  -     TSH; Future  -     T4, Free; Future  -     TSH  -     T4, Free    Vitamin D insufficiency  -     Vitamin D; Future  -     Vitamin D    Gastroesophageal reflux disease without esophagitis  -     dexlansoprazole (DEXILANT) 60 mg capsule; Take 1 capsule (60 mg total) by mouth every morning.    Mixed hyperlipidemia  -     Lipid Panel; Future  -     Lipid Panel  Limit red meat, butter, fried foods, cheese, and other foods that have a lot of saturated fat. Consume more: lean meats, fish, fruits, vegetables, whole grains, beans, lentils, and nuts.  Weight loss, and 30-45 min of cardiovascular exercise daily.      Mixed  anxiety depressive disorder  -     CBC Auto Differential; Future  -     Comprehensive Metabolic Panel; Future  -     Urinalysis; Future  -     CBC Auto Differential  -     Comprehensive Metabolic Panel  -     Urinalysis    Moderate persistent asthma with acute exacerbation  -     budesonide-formoterol 160-4.5 mcg (SYMBICORT) 160-4.5 mcg/actuation HFAA; INHALE TWO PUFFS BY MOUTH EVERY TWELVE HOURS  -     CBC Auto Differential; Future  -     Comprehensive Metabolic Panel; Future  -     Urinalysis; Future  -     CBC Auto Differential  -     Comprehensive Metabolic Panel  -     Urinalysis    Overweight (BMI 25.0-29.9)  The patient is asked to make an attempt to improve diet and exercise patterns to aid in medical management of this problem.    Other orders  -     Influenza - Quadrivalent (PF)    Follow up in 6 months with labs prior to office visit for chronic condition management.

## 2024-01-01 DIAGNOSIS — B00.1 FEVER BLISTER: ICD-10-CM

## 2024-01-02 RX ORDER — VALACYCLOVIR HYDROCHLORIDE 1 G/1
TABLET, FILM COATED ORAL
Qty: 90 TABLET | Refills: 3 | Status: SHIPPED | OUTPATIENT
Start: 2024-01-02

## 2024-04-07 DIAGNOSIS — K21.9 GASTROESOPHAGEAL REFLUX DISEASE WITHOUT ESOPHAGITIS: ICD-10-CM

## 2024-04-08 RX ORDER — DEXLANSOPRAZOLE 60 MG/1
60 CAPSULE, DELAYED RELEASE ORAL EVERY MORNING
Qty: 90 CAPSULE | Refills: 3 | Status: SHIPPED | OUTPATIENT
Start: 2024-04-08 | End: 2025-04-08

## 2024-04-23 ENCOUNTER — OFFICE VISIT (OUTPATIENT)
Dept: FAMILY MEDICINE | Facility: CLINIC | Age: 54
End: 2024-04-23
Payer: COMMERCIAL

## 2024-04-23 VITALS
DIASTOLIC BLOOD PRESSURE: 80 MMHG | TEMPERATURE: 99 F | HEIGHT: 64 IN | OXYGEN SATURATION: 98 % | WEIGHT: 173 LBS | SYSTOLIC BLOOD PRESSURE: 120 MMHG | BODY MASS INDEX: 29.53 KG/M2 | HEART RATE: 81 BPM

## 2024-04-23 DIAGNOSIS — F41.8 MIXED ANXIETY DEPRESSIVE DISORDER: ICD-10-CM

## 2024-04-23 DIAGNOSIS — Z11.4 ENCOUNTER FOR SCREENING FOR HIV: ICD-10-CM

## 2024-04-23 DIAGNOSIS — Z23 NEED FOR SHINGLES VACCINE: ICD-10-CM

## 2024-04-23 DIAGNOSIS — Z11.59 NEED FOR HEPATITIS C SCREENING TEST: ICD-10-CM

## 2024-04-23 DIAGNOSIS — B00.1 FEVER BLISTER: ICD-10-CM

## 2024-04-23 DIAGNOSIS — J45.41 MODERATE PERSISTENT ASTHMA WITH ACUTE EXACERBATION: ICD-10-CM

## 2024-04-23 DIAGNOSIS — E55.9 VITAMIN D INSUFFICIENCY: ICD-10-CM

## 2024-04-23 DIAGNOSIS — K21.9 GASTROESOPHAGEAL REFLUX DISEASE WITHOUT ESOPHAGITIS: ICD-10-CM

## 2024-04-23 DIAGNOSIS — Z00.00 ANNUAL PHYSICAL EXAM: Primary | ICD-10-CM

## 2024-04-23 DIAGNOSIS — Z01.00 ROUTINE EYE EXAM: ICD-10-CM

## 2024-04-23 DIAGNOSIS — E06.3 HASHIMOTO'S THYROIDITIS: ICD-10-CM

## 2024-04-23 DIAGNOSIS — E78.2 MIXED HYPERLIPIDEMIA: ICD-10-CM

## 2024-04-23 PROCEDURE — 1160F RVW MEDS BY RX/DR IN RCRD: CPT | Mod: CPTII,S$GLB,, | Performed by: NURSE PRACTITIONER

## 2024-04-23 PROCEDURE — 99999 PR PBB SHADOW E&M-EST. PATIENT-LVL IV: CPT | Mod: PBBFAC,,, | Performed by: NURSE PRACTITIONER

## 2024-04-23 PROCEDURE — 99396 PREV VISIT EST AGE 40-64: CPT | Mod: S$GLB,,, | Performed by: NURSE PRACTITIONER

## 2024-04-23 PROCEDURE — 3008F BODY MASS INDEX DOCD: CPT | Mod: CPTII,S$GLB,, | Performed by: NURSE PRACTITIONER

## 2024-04-23 PROCEDURE — 1159F MED LIST DOCD IN RCRD: CPT | Mod: CPTII,S$GLB,, | Performed by: NURSE PRACTITIONER

## 2024-04-23 PROCEDURE — 3074F SYST BP LT 130 MM HG: CPT | Mod: CPTII,S$GLB,, | Performed by: NURSE PRACTITIONER

## 2024-04-23 PROCEDURE — 3079F DIAST BP 80-89 MM HG: CPT | Mod: CPTII,S$GLB,, | Performed by: NURSE PRACTITIONER

## 2024-04-23 RX ORDER — VALACYCLOVIR HYDROCHLORIDE 500 MG/1
500 TABLET, FILM COATED ORAL DAILY
Qty: 90 TABLET | Refills: 1 | Status: SHIPPED | OUTPATIENT
Start: 2024-04-23 | End: 2025-04-23

## 2024-04-23 RX ORDER — AMOXICILLIN 875 MG/1
875 TABLET, FILM COATED ORAL 2 TIMES DAILY
COMMUNITY
Start: 2024-04-22

## 2024-04-23 RX ORDER — HYDROCODONE BITARTRATE AND ACETAMINOPHEN 5; 325 MG/1; MG/1
1 TABLET ORAL EVERY 6 HOURS PRN
COMMUNITY
Start: 2024-04-22

## 2024-04-23 NOTE — PROGRESS NOTES
HPI: Riddhi Thapa  is a 53 y.o. female who presents for annual physical .  No complaints at this time.     Review of Systems   Constitutional:  Negative for activity change, appetite change and fever.   HENT:  Negative for congestion, ear discharge, ear pain, hearing loss, sore throat, trouble swallowing and voice change.    Eyes:  Negative for photophobia, pain, discharge and visual disturbance.   Respiratory:  Negative for cough, chest tightness, shortness of breath and wheezing.    Cardiovascular:  Negative for chest pain and palpitations.   Gastrointestinal:  Negative for abdominal pain, constipation, diarrhea, nausea and vomiting.   Endocrine: Negative for cold intolerance and heat intolerance.   Genitourinary:  Negative for difficulty urinating, dysuria and hematuria.   Musculoskeletal:  Negative for arthralgias and gait problem.   Skin:  Negative for rash.   Allergic/Immunologic: Negative for immunocompromised state.   Neurological:  Negative for speech difficulty and headaches.   Psychiatric/Behavioral:  Negative for confusion, dysphoric mood, self-injury and suicidal ideas.      Review of patient's allergies indicates:   Allergen Reactions    Nsaids (non-steroidal anti-inflammatory drug)      causes upset stomach, has GERD     Past Medical History:   Diagnosis Date    Anxiety     Asthma     Depression     GERD (gastroesophageal reflux disease)     Hashimoto's thyroiditis     Hyperlipidemia     Hypothyroidism     Kidney stones      Past Surgical History:   Procedure Laterality Date    EXTRACORPOREAL SHOCK WAVE LITHOTRIPSY  2011    HYSTERECTOMY      LITHOTRIPSY      SINUS SURGERY  2015    TUBAL LIGATION      Bi-Lat     Family History   Problem Relation Name Age of Onset    Eczema Mother      Thyroid disease Mother      Cancer Father          Renal    Eczema Father      COPD Maternal Grandmother      Diabetes Maternal Grandmother      Stroke Maternal Grandmother      Thyroid disease Maternal Grandmother       Breast cancer Paternal Aunt      Asthma Neg Hx      Immunodeficiency Neg Hx      Rhinitis Neg Hx       Social History     Tobacco Use    Smoking status: Never    Smokeless tobacco: Never   Substance Use Topics    Alcohol use: Yes     Comment: weekends only    Drug use: No      Health Maintenance Topics with due status: Not Due       Topic Last Completion Date    Colorectal Cancer Screening 06/28/2017    TETANUS VACCINE 02/27/2018    Mammogram 10/13/2023    Lipid Panel 04/19/2024     Immunization History   Administered Date(s) Administered    COVID-19, MRNA, LN-S, PF (Pfizer) (Purple Cap) 03/09/2021, 03/30/2021    Influenza - Quadrivalent - PF *Preferred* (6 months and older) 02/27/2018, 02/28/2019, 11/20/2019, 10/07/2020, 10/13/2021, 10/11/2022, 10/26/2023    Influenza A (H1N1) 2009 Monovalent - IM 11/03/2009    Pneumococcal Polysaccharide - 23 Valent 02/22/2016    Tdap 02/27/2018     OBJECTIVE:      Vitals:    04/23/24 1411   BP: 120/80   Pulse: 81   Temp: 98.6 °F (37 °C)     Physical Exam  Vitals and nursing note reviewed.   Constitutional:       General: She is not in acute distress.     Appearance: Normal appearance. She is well-developed. She is not ill-appearing.   HENT:      Head: Normocephalic and atraumatic.      Right Ear: Tympanic membrane normal.      Left Ear: Tympanic membrane normal.      Nose: Nose normal.      Mouth/Throat:      Mouth: Mucous membranes are moist.      Pharynx: Oropharynx is clear.   Eyes:      General: Lids are normal. Lids are everted, no foreign bodies appreciated.      Conjunctiva/sclera: Conjunctivae normal.      Pupils: Pupils are equal, round, and reactive to light.      Right eye: Pupil is round and reactive.      Left eye: Pupil is round and reactive.   Neck:      Trachea: Trachea normal.   Cardiovascular:      Rate and Rhythm: Normal rate and regular rhythm.      Pulses: Normal pulses.      Heart sounds: Normal heart sounds, S1 normal and S2 normal.   Pulmonary:       Effort: Pulmonary effort is normal.      Breath sounds: Normal breath sounds.   Abdominal:      General: Abdomen is flat. Bowel sounds are normal.      Palpations: Abdomen is soft. Abdomen is not rigid.      Tenderness: There is no guarding.   Musculoskeletal:         General: Normal range of motion.      Cervical back: Normal range of motion and neck supple. No muscular tenderness.   Lymphadenopathy:      Cervical: No cervical adenopathy.   Skin:     General: Skin is warm and dry.      Capillary Refill: Capillary refill takes less than 2 seconds.   Neurological:      General: No focal deficit present.      Mental Status: She is alert and oriented to person, place, and time.   Psychiatric:         Mood and Affect: Mood normal.         Behavior: Behavior normal. Behavior is cooperative.         Thought Content: Thought content normal.         Judgment: Judgment normal.        Assessment:       1. Annual physical exam    2. Hashimoto's thyroiditis    3. Vitamin D insufficiency    4. Gastroesophageal reflux disease without esophagitis    5. Mixed hyperlipidemia    6. Mixed anxiety depressive disorder    7. Moderate persistent asthma with acute exacerbation    8. Need for hepatitis C screening test    9. Encounter for screening for HIV    10. Routine eye exam    11. Need for shingles vaccine    12. Fever blister        Plan:       Annual physical exam    Hashimoto's thyroiditis  -     TSH; Future; Expected date: 10/23/2024  -     T4, Free; Future; Expected date: 10/23/2024    Vitamin D insufficiency  -     Vitamin D; Future; Expected date: 10/23/2024    Gastroesophageal reflux disease without esophagitis  -     Comprehensive Metabolic Panel; Future; Expected date: 10/23/2024    Mixed hyperlipidemia  -     Lipid Panel; Future; Expected date: 10/23/2024    Mixed anxiety depressive disorder  -     CBC Auto Differential; Future; Expected date: 10/23/2024  -     Comprehensive Metabolic Panel; Future; Expected date:  10/23/2024  -     Urinalysis; Future; Expected date: 10/23/2024    Moderate persistent asthma with acute exacerbation  -     CBC Auto Differential; Future; Expected date: 10/23/2024  -     Comprehensive Metabolic Panel; Future; Expected date: 10/23/2024    Need for hepatitis C screening test  -     HEPATITIS C ANTIBODY; Future; Expected date: 10/23/2024    Encounter for screening for HIV  -     HIV 1/2 Ag/Ab (4th Gen); Future; Expected date: 10/23/2024    Routine eye exam  Needs to schedule    Need for shingles vaccine  States she received from [employer] on April 9th. Will request records    Fever blister  -     valACYclovir (VALTREX) 500 MG tablet; Take 1 tablet (500 mg total) by mouth once daily.  Dispense: 90 tablet; Refill: 1        Patient counseled on age appropriate medical preventative services, age appropriate cancer screenings, nutrition, healthy diet, consistent exercise regimen and maintaining an active lifestyle.      Counseled on age appropriate vaccines and discussed upcoming health care needs based on age/gender.  Spent time with patient counseling on need for a good patient/doctor relationship moving forward.  Discussed use of common OTC medications and supplements.  Discussed common dietary aids and use of caffeine and the need for good sleep hygiene and stress management.    Follow up in about 6 months (around 10/23/2024) for hyperlipidemia, thyroid, lab review.      4/23/2024 HEDY Schmidt, FNP-C

## 2024-05-01 DIAGNOSIS — E06.3 HASHIMOTO'S THYROIDITIS: ICD-10-CM

## 2024-05-06 RX ORDER — LEVOTHYROXINE SODIUM 50 UG/1
TABLET ORAL
Qty: 90 TABLET | Refills: 3 | Status: SHIPPED | OUTPATIENT
Start: 2024-05-06

## 2024-09-06 NOTE — PROGRESS NOTES
"Subjective:       Patient ID: Riddhi Thapa is a 47 y.o. female.    Chief Complaint: Asthma and Allergies    HPI     Pt states her asthma has been "ok"  Her insurance company decided not to cover her maintenance inhaler  I gave her a coupon and switched her to symbicort so that she could have coverage for her asthma  No major flares but she has been off of her medications for a few weeks.   She continue montelukast.     She had an EGD done and I reviewed the pathology with her.   She had gastric polyps.     No mention of Eos on her distal biopsy report.   She is still having significant amount of reflux and is taking dexilant at the max dose    She would like to be skin prick tested to foods.     Review of Systems    General: neg unexpected weight changes, fevers, chills, night sweats, malaise  HEENT: see hpi, Neg eye pain, vision changes, ear drainage, nose bleeds, throat tightness, sores in the mouth  CV: Neg chest pain, palpitations, swelling  Resp: see hpi, neg shortness of breath, hemoptysis, cough  GI: see hpi, neg dysphagia, night abdominal pain, chronic diarrhea, chronic constipation  Derm: See Hpi, neg new rash, neg flushing  Mu/sk: Neg joint pain, joint swelling   Psych: Neg anxiety  neuro: neg chronic headaches, muscle weakness  Endo: neg heat/cold intolerance, chronic fatigue    Objective:       Vitals:    04/05/18 1452   BP: 114/80   Pulse: 89   SpO2: 98%   Weight: 90.7 kg (200 lb)   Height: 5' 4" (1.626 m)   PF: 425 L/min       Physical Exam    Assessment:       1. Moderate persistent asthma with acute exacerbation        Plan:       Moderate persistent asthma with acute exacerbation  -     budesonide-formoterol 160-4.5 mcg (SYMBICORT) 160-4.5 mcg/actuation HFAA; Inhale 2 puffs into the lungs every 12 (twelve) hours. Controller  Dispense: 1 Inhaler; Refill: 3        skin prick test to food panel  Instruction sheet given to patient of what medications to avoid.   Allergy shot today for vial 1 and " [FreeTextEntry1] : ABDIAZIZ FRANCO is a 75 year old male with low back pain. I discussed with the patient that their symptoms, signs, and imaging are most consistent with facet arthropathy and myofascial pain. We reviewed the natural history of this condition and treatment options. We agreed on the following plan:   XR taken and reviewed with patient. Activity modification: low impact aerobic activity (stationary bike, elliptical, swimming). Recommend 150 min of moderate intensity aerobic activity per week. Start Home Exercises for spine conditioning. Demonstration, resistance bands and handout provided. Physical therapy. Referral provided. Medication: c/w Diclofenac 50mg BID prn refill prescription provided. Cyclobenzaprine 5-10 mg HS prn prescription provided. Apply heat to low back when supine and knees flexed at 90 deg. Demonstrated provided. Advanced imaging: consider MRI if no symptomatic improvement. Follow up in 6-8 weeks. vial 2 1:1 concentration at 0.5 mL  Pt observed for 30 mins and d/c in good condition.     Discussed face to face >50% of visit for 25 mins about path, next step in eval, medication change. Answered all questions.

## 2024-09-10 ENCOUNTER — OFFICE VISIT (OUTPATIENT)
Dept: FAMILY MEDICINE | Facility: CLINIC | Age: 54
End: 2024-09-10
Payer: COMMERCIAL

## 2024-09-10 VITALS
SYSTOLIC BLOOD PRESSURE: 110 MMHG | TEMPERATURE: 99 F | HEIGHT: 64 IN | DIASTOLIC BLOOD PRESSURE: 78 MMHG | BODY MASS INDEX: 30.39 KG/M2 | OXYGEN SATURATION: 99 % | WEIGHT: 178 LBS | HEART RATE: 81 BPM

## 2024-09-10 DIAGNOSIS — R05.1 ACUTE COUGH: ICD-10-CM

## 2024-09-10 DIAGNOSIS — E66.09 CLASS 1 OBESITY DUE TO EXCESS CALORIES WITH SERIOUS COMORBIDITY AND BODY MASS INDEX (BMI) OF 30.0 TO 30.9 IN ADULT: ICD-10-CM

## 2024-09-10 DIAGNOSIS — J01.00 ACUTE NON-RECURRENT MAXILLARY SINUSITIS: Primary | ICD-10-CM

## 2024-09-10 PROCEDURE — 1159F MED LIST DOCD IN RCRD: CPT | Mod: CPTII,S$GLB,, | Performed by: NURSE PRACTITIONER

## 2024-09-10 PROCEDURE — 3074F SYST BP LT 130 MM HG: CPT | Mod: CPTII,S$GLB,, | Performed by: NURSE PRACTITIONER

## 2024-09-10 PROCEDURE — 99999 PR PBB SHADOW E&M-EST. PATIENT-LVL IV: CPT | Mod: PBBFAC,,, | Performed by: NURSE PRACTITIONER

## 2024-09-10 PROCEDURE — 3078F DIAST BP <80 MM HG: CPT | Mod: CPTII,S$GLB,, | Performed by: NURSE PRACTITIONER

## 2024-09-10 PROCEDURE — 99213 OFFICE O/P EST LOW 20 MIN: CPT | Mod: S$GLB,,, | Performed by: NURSE PRACTITIONER

## 2024-09-10 PROCEDURE — 3008F BODY MASS INDEX DOCD: CPT | Mod: CPTII,S$GLB,, | Performed by: NURSE PRACTITIONER

## 2024-09-10 PROCEDURE — 1160F RVW MEDS BY RX/DR IN RCRD: CPT | Mod: CPTII,S$GLB,, | Performed by: NURSE PRACTITIONER

## 2024-09-10 RX ORDER — PROMETHAZINE HYDROCHLORIDE AND DEXTROMETHORPHAN HYDROBROMIDE 6.25; 15 MG/5ML; MG/5ML
5 SYRUP ORAL NIGHTLY PRN
Qty: 118 ML | Refills: 0 | Status: SHIPPED | OUTPATIENT
Start: 2024-09-10

## 2024-09-10 RX ORDER — DOXYCYCLINE HYCLATE 100 MG
100 TABLET ORAL 2 TIMES DAILY
Qty: 20 TABLET | Refills: 0 | Status: SHIPPED | OUTPATIENT
Start: 2024-09-10

## 2024-09-10 RX ORDER — LISDEXAMFETAMINE DIMESYLATE 50 MG/1
50 CAPSULE ORAL EVERY MORNING
COMMUNITY
Start: 2024-07-24

## 2024-09-10 NOTE — PROGRESS NOTES
Subjective:       Patient ID: Riddhi Thapa is a 54 y.o. female.    Chief Complaint: Cough, Chest Congestion, and Nasal Congestion    Cough  This is a recurrent problem. The current episode started more than 1 month ago. The problem has been waxing and waning. The problem occurs every few minutes. The cough is Productive of sputum. Associated symptoms include ear congestion, ear pain, headaches, nasal congestion, postnasal drip, rhinorrhea, a sore throat, shortness of breath and wheezing. Pertinent negatives include no chest pain, chills, fever, heartburn, hemoptysis, myalgias, rash, sweats or weight loss. She has tried a beta-agonist inhaler, body position changes and steroid inhaler for the symptoms. The treatment provided no relief. Her past medical history is significant for asthma, bronchitis and environmental allergies. There is no history of bronchiectasis, COPD, emphysema or pneumonia.   Sinus Problem  This is a new problem. The current episode started 1 to 4 weeks ago. The problem has been waxing and waning since onset. There has been no fever. Associated symptoms include congestion, coughing, ear pain, headaches, shortness of breath, sinus pressure and a sore throat. Pertinent negatives include no chills. Past treatments include oral decongestants, saline nose sprays, lying down and sitting up. The treatment provided mild relief.     Review of Systems   Constitutional:  Positive for activity change. Negative for appetite change, chills, fever and weight loss.   HENT:  Positive for congestion, ear pain, postnasal drip, rhinorrhea, sinus pressure and sore throat. Negative for ear discharge, trouble swallowing and voice change.    Eyes:  Negative for photophobia, pain, discharge and visual disturbance.   Respiratory:  Positive for cough, shortness of breath and wheezing. Negative for hemoptysis and chest tightness.    Cardiovascular:  Negative for chest pain and palpitations.   Gastrointestinal:   Negative for abdominal pain, heartburn, nausea and vomiting.   Endocrine: Negative for cold intolerance and heat intolerance.   Genitourinary:  Negative for difficulty urinating and dysuria.   Musculoskeletal:  Negative for arthralgias, gait problem and myalgias.   Skin:  Negative for rash.   Allergic/Immunologic: Positive for environmental allergies. Negative for immunocompromised state.   Neurological:  Positive for headaches. Negative for speech difficulty.   Psychiatric/Behavioral:  Negative for confusion, self-injury and suicidal ideas.        Past Medical History:   Diagnosis Date    Anxiety     Asthma     Depression     GERD (gastroesophageal reflux disease)     Hashimoto's thyroiditis     Hyperlipidemia     Hypothyroidism     Kidney stones       Past Surgical History:   Procedure Laterality Date    EXTRACORPOREAL SHOCK WAVE LITHOTRIPSY  2011    HYSTERECTOMY      LITHOTRIPSY      SINUS SURGERY  2015    TUBAL LIGATION      Bi-Lat       Family History   Problem Relation Name Age of Onset    Eczema Mother      Thyroid disease Mother      Cancer Father          Renal    Eczema Father      COPD Maternal Grandmother      Diabetes Maternal Grandmother      Stroke Maternal Grandmother      Thyroid disease Maternal Grandmother      Breast cancer Paternal Aunt      Asthma Neg Hx      Immunodeficiency Neg Hx      Rhinitis Neg Hx         Social History     Socioeconomic History    Marital status:    Occupational History    Occupation: Teacher     Comment: [employer]   Tobacco Use    Smoking status: Never    Smokeless tobacco: Never   Substance and Sexual Activity    Alcohol use: Yes     Comment: weekends only    Drug use: No    Sexual activity: Yes     Partners: Male     Social Determinants of Health     Financial Resource Strain: Patient Declined (4/23/2024)    Overall Financial Resource Strain (CARDIA)     Difficulty of Paying Living Expenses: Patient declined   Food Insecurity: Patient Declined (4/23/2024)     Hunger Vital Sign     Worried About Running Out of Food in the Last Year: Patient declined     Ran Out of Food in the Last Year: Patient declined   Transportation Needs: Patient Declined (4/23/2024)    PRAPARE - Transportation     Lack of Transportation (Medical): Patient declined     Lack of Transportation (Non-Medical): Patient declined   Physical Activity: Unknown (4/23/2024)    Exercise Vital Sign     Days of Exercise per Week: Patient declined     Minutes of Exercise per Session: 40 min   Stress: Patient Declined (4/23/2024)    [employer] [place] [employer] - Occupational Stress Questionnaire     Feeling of Stress : Patient declined   Housing Stability: Unknown (4/23/2024)    Housing Stability Vital Sign     Unable to Pay for Housing in the Last Year: Patient declined       Current Outpatient Medications   Medication Sig Dispense Refill    albuterol (PROVENTIL/VENTOLIN HFA) 90 mcg/actuation inhaler INHALE TWO PUFFS BY MOUTH EVERY SIX HOURS AS NEEDED FOR WHEEZING 18 g 2    azelastine-fluticasone (DYMISTA) 137-50 mcg/spray Spry nassal spray 1 spray by Each Nostril route 2 (two) times daily. 3 each 1    budesonide-formoterol 160-4.5 mcg (SYMBICORT) 160-4.5 mcg/actuation HFAA INHALE TWO PUFFS BY MOUTH EVERY TWELVE HOURS 11 g 3    cetirizine 10 mg Cap Take 1 capsule by mouth daily as needed.      cholecalciferol, vitamin D3, (VITAMIN D3) 50 mcg (2,000 unit) Cap Take 1 capsule by mouth once daily.      dexlansoprazole (DEXILANT) 60 mg capsule Take 1 capsule (60 mg total) by mouth every morning. 90 capsule 3    dextroamphetamine-amphetamine (ADDERALL XR) 30 MG 24 hr capsule Take 30 mg by mouth every morning.      ENBRACE HR 1.5 mg iron- 8.73 mg-6.4 mg capsule Take 1 capsule by mouth Daily.      peak flow meter (PERSONAL BEST FULL RANGE) Claudine To be used as directed by MD 1 each 0    SYNTHROID 50 mcg tablet TAKE 1 TABLET BEFORE BREAKFAST 90 tablet 3    valACYclovir (VALTREX) 500 MG tablet Take 1 tablet  "(500 mg total) by mouth once daily. 90 tablet 1    doxycycline (VIBRA-TABS) 100 MG tablet Take 1 tablet (100 mg total) by mouth 2 (two) times daily. 20 tablet 0    lisdexamfetamine (VYVANSE) 50 MG capsule TAKE 1 CAPSULE BY MOUTH EVERY MORNING WITH WATER 30 capsule 0    promethazine-dextromethorphan (PROMETHAZINE-DM) 6.25-15 mg/5 mL Syrp Take 5 mLs by mouth nightly as needed (cough). 118 mL 0    VYVANSE 50 mg capsule Take 50 mg by mouth every morning. (Patient not taking: Reported on 9/10/2024)       No current facility-administered medications for this visit.       Review of patient's allergies indicates:   Allergen Reactions    Nsaids (non-steroidal anti-inflammatory drug)      causes upset stomach, has GERD     Objective:      Blood pressure 110/78, pulse 81, temperature 98.9 °F (37.2 °C), height 5' 4" (1.626 m), weight 80.7 kg (178 lb), last menstrual period 09/16/2012, SpO2 99%. Body mass index is 30.55 kg/m².   Physical Exam  Vitals and nursing note reviewed.   Constitutional:       General: She is not in acute distress.     Appearance: Normal appearance. She is well-developed.   HENT:      Head: Normocephalic and atraumatic.      Right Ear: External ear normal. A middle ear effusion is present. Tympanic membrane is bulging.      Left Ear: External ear normal. A middle ear effusion is present. Tympanic membrane is bulging.      Nose: Mucosal edema present.      Right Turbinates: Swollen.      Left Turbinates: Swollen.      Right Sinus: Maxillary sinus tenderness present.      Left Sinus: Maxillary sinus tenderness present.      Mouth/Throat:      Mouth: Mucous membranes are moist.      Pharynx: Uvula midline. Oropharyngeal exudate present. No pharyngeal swelling.   Eyes:      General: Lids are normal. Lids are everted, no foreign bodies appreciated.      Conjunctiva/sclera: Conjunctivae normal.      Pupils: Pupils are equal, round, and reactive to light.      Right eye: Pupil is round and reactive.      Left " eye: Pupil is round and reactive.   Neck:      Trachea: Trachea normal.   Cardiovascular:      Rate and Rhythm: Normal rate and regular rhythm.      Pulses: Normal pulses.      Heart sounds: Normal heart sounds, S1 normal and S2 normal.   Pulmonary:      Effort: Pulmonary effort is normal.      Breath sounds: Normal breath sounds.   Abdominal:      General: Abdomen is flat. Bowel sounds are normal.      Palpations: Abdomen is soft. Abdomen is not rigid.      Tenderness: There is no guarding.   Musculoskeletal:         General: Normal range of motion.      Cervical back: Normal range of motion and neck supple. No muscular tenderness.   Lymphadenopathy:      Cervical: No cervical adenopathy.   Skin:     General: Skin is warm and dry.      Capillary Refill: Capillary refill takes less than 2 seconds.   Neurological:      General: No focal deficit present.      Mental Status: She is alert and oriented to person, place, and time.   Psychiatric:         Mood and Affect: Mood normal.         Behavior: Behavior normal. Behavior is cooperative.         Thought Content: Thought content normal.         Judgment: Judgment normal.             Assessment:       1. Acute non-recurrent maxillary sinusitis    2. Acute cough    3. Class 1 obesity due to excess calories with serious comorbidity and body mass index (BMI) of 30.0 to 30.9 in adult        Plan:       Riddhi was seen today for cough, chest congestion and nasal congestion.    Diagnoses and all orders for this visit:    Acute non-recurrent maxillary sinusitis  -     doxycycline (VIBRA-TABS) 100 MG tablet; Take 1 tablet (100 mg total) by mouth 2 (two) times daily.    Acute cough  -     promethazine-dextromethorphan (PROMETHAZINE-DM) 6.25-15 mg/5 mL Syrp; Take 5 mLs by mouth nightly as needed (cough).    Class 1 obesity due to excess calories with serious comorbidity and body mass index (BMI) of 30.0 to 30.9 in adult  The patient is asked to make an attempt to improve diet  and exercise patterns to aid in medical management of this problem.         Follow up if not improving in 1-2 weeks

## 2024-10-01 DIAGNOSIS — B00.1 FEVER BLISTER: ICD-10-CM

## 2024-10-02 RX ORDER — VALACYCLOVIR HYDROCHLORIDE 500 MG/1
500 TABLET, FILM COATED ORAL
Qty: 90 TABLET | Refills: 3 | Status: SHIPPED | OUTPATIENT
Start: 2024-10-02

## 2024-10-17 ENCOUNTER — PATIENT MESSAGE (OUTPATIENT)
Dept: FAMILY MEDICINE | Facility: CLINIC | Age: 54
End: 2024-10-17
Payer: COMMERCIAL

## 2024-10-24 ENCOUNTER — OFFICE VISIT (OUTPATIENT)
Dept: FAMILY MEDICINE | Facility: CLINIC | Age: 54
End: 2024-10-24
Payer: COMMERCIAL

## 2024-10-24 VITALS
TEMPERATURE: 98 F | HEART RATE: 85 BPM | OXYGEN SATURATION: 98 % | HEIGHT: 64 IN | BODY MASS INDEX: 30.39 KG/M2 | WEIGHT: 178 LBS | DIASTOLIC BLOOD PRESSURE: 78 MMHG | SYSTOLIC BLOOD PRESSURE: 110 MMHG

## 2024-10-24 DIAGNOSIS — E06.3 HASHIMOTO'S THYROIDITIS: Primary | ICD-10-CM

## 2024-10-24 DIAGNOSIS — F41.8 MIXED ANXIETY DEPRESSIVE DISORDER: ICD-10-CM

## 2024-10-24 DIAGNOSIS — E55.9 VITAMIN D INSUFFICIENCY: ICD-10-CM

## 2024-10-24 DIAGNOSIS — J45.41 MODERATE PERSISTENT ASTHMA WITH ACUTE EXACERBATION: ICD-10-CM

## 2024-10-24 DIAGNOSIS — E66.09 CLASS 1 OBESITY DUE TO EXCESS CALORIES WITH SERIOUS COMORBIDITY AND BODY MASS INDEX (BMI) OF 30.0 TO 30.9 IN ADULT: ICD-10-CM

## 2024-10-24 DIAGNOSIS — E66.811 CLASS 1 OBESITY DUE TO EXCESS CALORIES WITH SERIOUS COMORBIDITY AND BODY MASS INDEX (BMI) OF 30.0 TO 30.9 IN ADULT: ICD-10-CM

## 2024-10-24 DIAGNOSIS — K21.9 GASTROESOPHAGEAL REFLUX DISEASE WITHOUT ESOPHAGITIS: ICD-10-CM

## 2024-10-24 DIAGNOSIS — E78.2 MIXED HYPERLIPIDEMIA: ICD-10-CM

## 2024-10-24 PROCEDURE — 3008F BODY MASS INDEX DOCD: CPT | Mod: CPTII,S$GLB,, | Performed by: NURSE PRACTITIONER

## 2024-10-24 PROCEDURE — 99999 PR PBB SHADOW E&M-EST. PATIENT-LVL IV: CPT | Mod: PBBFAC,,, | Performed by: NURSE PRACTITIONER

## 2024-10-24 PROCEDURE — 3074F SYST BP LT 130 MM HG: CPT | Mod: CPTII,S$GLB,, | Performed by: NURSE PRACTITIONER

## 2024-10-24 PROCEDURE — 1160F RVW MEDS BY RX/DR IN RCRD: CPT | Mod: CPTII,S$GLB,, | Performed by: NURSE PRACTITIONER

## 2024-10-24 PROCEDURE — 99214 OFFICE O/P EST MOD 30 MIN: CPT | Mod: S$GLB,,, | Performed by: NURSE PRACTITIONER

## 2024-10-24 PROCEDURE — 1159F MED LIST DOCD IN RCRD: CPT | Mod: CPTII,S$GLB,, | Performed by: NURSE PRACTITIONER

## 2024-10-24 PROCEDURE — 3078F DIAST BP <80 MM HG: CPT | Mod: CPTII,S$GLB,, | Performed by: NURSE PRACTITIONER

## 2024-10-24 RX ORDER — DEXLANSOPRAZOLE 60 MG/1
60 CAPSULE, DELAYED RELEASE ORAL EVERY MORNING
Qty: 90 CAPSULE | Refills: 3 | Status: SHIPPED | OUTPATIENT
Start: 2024-10-24 | End: 2025-10-24

## 2024-10-24 RX ORDER — LEVOTHYROXINE SODIUM 75 UG/1
75 TABLET ORAL
Qty: 90 TABLET | Refills: 1 | Status: SHIPPED | OUTPATIENT
Start: 2024-10-24 | End: 2025-10-24

## 2024-10-24 NOTE — PROGRESS NOTES
Subjective:       Patient ID: Riddhi Thapa is a 54 y.o. female.    Chief Complaint: Thyroid Problem and Follow-up    History of Present Illness    CHIEF COMPLAINT:  Patient presents today to discuss results of recent labs and for follow-up on previous ear issues.    HPI:  Patient reports ongoing allergy symptoms with constant mucus production and elevated eosinophils attributed to the fall allergy season. She consulted an allergist, Dr. Jones, and underwent immunotherapy for 5 years. She acknowledges irregular use of nasal saline rinses but plans to resume this practice to manage symptoms.    Patient does not feel optimal, attributing this to potentially entering menopause at age 54. She mentions weight gain.    She describes recent severe headaches over the last couple of days, with photosensitivity. The pain is localized to her eyes and face, initially thought to be sinus-related. She has pressure, fullness, and dental pain, which are consistent symptoms for her.    Regarding her ears, the patient reports significant improvement from her last visit 6 weeks ago. She still has ear pain during air travel, attributing it to mucus and fluid buildup.    Patient recently returned from a 4-day trip to [place] for a [employer] [place] experience, reporting increased food and alcohol consumption during this time, which is atypical for her.    Patient denies any symptoms of urinary tract infection.    MEDICATIONS:  Patient is on Synthroid 50 mcg daily for her thyroid condition. She uses generic Dymista in the morning and evening, as well as a combination of generic Flonase and Astelin for allergies. She also takes a supplement called Embrace or In Light, which contains B12.    MEDICAL HISTORY:  Patient has a history of allergies and a thyroid condition. Patient experienced menopause at the age of 54. She has not had a hysterectomy and has never been on hormone replacement therapy (HRT). She has received the first  shingles vaccine.    TEST RESULTS:  Recent labs showed the patient's eosinophils were elevated by 0.1 point, which is related to her allergies. Her RDW was low by 0.1 point. Her blood sugar was 100, the highest recorded. Cholesterol was slightly increased but within normal range, with HDL also increased. Urinalysis revealed an off pH, possibly due to mild dehydration or diet change. TSH was 2.5, within normal range. Vitamin D was 38, at the low end of the normal range. Complete Blood Count showed normal RBC size, shape, and color, indicating sufficient iron and B12.    SOCIAL HISTORY:  Patient recently consumed bourbon for four consecutive days during a trip to [place].      ROS:  General: -fever, -chills, -fatigue, +weight gain, -weight loss  Eyes: -vision changes, -redness, -discharge  ENT: +ear pain, +nasal congestion, -sore throat  Cardiovascular: -chest pain, -palpitations, -lower extremity edema  Respiratory: -cough, -shortness of breath  Gastrointestinal: -abdominal pain, -nausea, -vomiting, -diarrhea, -constipation, -blood in stool  Genitourinary: -dysuria, -hematuria, -frequency, -urgency  Musculoskeletal: -joint pain, -muscle pain  Skin: -rash, -lesion  Neurological: +headache, -dizziness, -numbness, -tingling  Psychiatric: -anxiety, -depression, -sleep difficulty         Past Medical History:   Diagnosis Date    Anxiety     Asthma     Depression     GERD (gastroesophageal reflux disease)     Hashimoto's thyroiditis     Hyperlipidemia     Hypothyroidism     Kidney stones         Past Surgical History:   Procedure Laterality Date    EXTRACORPOREAL SHOCK WAVE LITHOTRIPSY  2011    HYSTERECTOMY      LITHOTRIPSY      SINUS SURGERY  2015    TUBAL LIGATION      Bi-Lat       Family History   Problem Relation Name Age of Onset    Eczema Mother      Thyroid disease Mother      Cancer Father          Renal    Eczema Father      COPD Maternal Grandmother      Diabetes Maternal Grandmother      Stroke Maternal  Grandmother      Thyroid disease Maternal Grandmother      Breast cancer Paternal Aunt      Asthma Neg Hx      Immunodeficiency Neg Hx      Rhinitis Neg Hx         Social History     Socioeconomic History    Marital status:    Occupational History    Occupation: Teacher     Comment: [employer]   Tobacco Use    Smoking status: Never    Smokeless tobacco: Never   Substance and Sexual Activity    Alcohol use: Yes     Comment: weekends only    Drug use: No    Sexual activity: Yes     Partners: Male     Social Drivers of Health     Financial Resource Strain: Patient Declined (4/23/2024)    Overall Financial Resource Strain (CARDIA)     Difficulty of Paying Living Expenses: Patient declined   Food Insecurity: Patient Declined (4/23/2024)    Hunger Vital Sign     Worried About Running Out of Food in the Last Year: Patient declined     Ran Out of Food in the Last Year: Patient declined   Transportation Needs: Unknown (4/23/2024)    PRAPARE - Transportation     Lack of Transportation (Non-Medical): Patient declined   Physical Activity: Unknown (4/23/2024)    Exercise Vital Sign     Days of Exercise per Week: Patient declined   Stress: Patient Declined (4/23/2024)    [employer] - Occupational Stress Questionnaire     Feeling of Stress : Patient declined   Housing Stability: Unknown (4/23/2024)    Housing Stability Vital Sign     Unable to Pay for Housing in the Last Year: Patient declined       Current Outpatient Medications   Medication Sig Dispense Refill    albuterol (PROVENTIL/VENTOLIN HFA) 90 mcg/actuation inhaler INHALE TWO PUFFS BY MOUTH EVERY SIX HOURS AS NEEDED FOR WHEEZING 18 g 2    azelastine-fluticasone (DYMISTA) 137-50 mcg/spray Spry nassal spray 1 spray by Each Nostril route 2 (two) times daily. 3 each 1    budesonide-formoterol 160-4.5 mcg (SYMBICORT) 160-4.5 mcg/actuation HFAA INHALE TWO PUFFS BY MOUTH EVERY TWELVE HOURS 11 g 3    cetirizine 10 mg Cap Take 1 capsule by  "mouth daily as needed.      cholecalciferol, vitamin D3, (VITAMIN D3) 50 mcg (2,000 unit) Cap Take 1 capsule by mouth once daily.      dextroamphetamine-amphetamine (ADDERALL XR) 30 MG 24 hr capsule Take 30 mg by mouth every morning.      ENBRACE HR 1.5 mg iron- 8.73 mg-6.4 mg capsule Take 1 capsule by mouth Daily.      peak flow meter (PERSONAL BEST FULL RANGE) Claudine To be used as directed by MD 1 each 0    valACYclovir (VALTREX) 500 MG tablet TAKE 1 TABLET DAILY 90 tablet 3    dexlansoprazole (DEXILANT) 60 mg capsule Take 1 capsule (60 mg total) by mouth every morning. 90 capsule 3    lisdexamfetamine (VYVANSE) 50 MG capsule TAKE 1 CAPSULE BY MOUTH EVERY MORNING WITH WATER (Patient not taking: Reported on 10/24/2024) 30 capsule 0    SYNTHROID 75 mcg tablet Take 1 tablet (75 mcg total) by mouth before breakfast. 90 tablet 1     No current facility-administered medications for this visit.       Review of patient's allergies indicates:   Allergen Reactions    Nsaids (non-steroidal anti-inflammatory drug)      causes upset stomach, has GERD     Objective:      Blood pressure 110/78, pulse 85, temperature 97.6 °F (36.4 °C), height 5' 4" (1.626 m), weight 80.7 kg (178 lb), last menstrual period 09/16/2012, SpO2 98%. Body mass index is 30.55 kg/m².   Physical Exam    General: No acute distress. Well-developed. Well-nourished.  Eyes: EOMI. Sclerae anicteric.  HENT: Normocephalic. Atraumatic. Nares patent. Moist oral mucosa.  Ears: Bilateral TMs clear. Bilateral EACs clear. Mild fluid in right ear.  Cardiovascular: Regular rate. Regular rhythm. No murmurs. No rubs. No gallops. Normal S1, S2.  Respiratory: Normal respiratory effort. Clear to auscultation bilaterally. No rales. No rhonchi. No wheezing. Normal breath sounds.  Abdomen: Soft. Non-tender. Non-distended. Normoactive bowel sounds.  Musculoskeletal: No  obvious deformity.  Extremities: No lower extremity edema.  Neurological: Alert & oriented x3. No slurred speech. " Normal gait.  Psychiatric: Normal mood. Normal affect. Good insight. Good judgment.  Skin: Warm. Dry. No rash.         Assessment:       Assessment & Plan    - Assessed recent bloodwork, noting elevated eosinophils likely due to seasonal allergies  - Evaluated ear condition, noting significant improvement from previous visit  - Considered recent lifestyle factors ([place] trip) as potential cause for elevated blood sugar (100)  - Determined cholesterol levels acceptable despite slight increase  - Assessed thyroid function via TSH (2.5) and decided to increase thyroid medication dose due to patient's weight gain  - Evaluated vitamin D levels (38) as low end of normal  - Considered headache symptoms, differentiating between sinus-related and migraine    ANEMIA SCREENING:  - Explained RDW results, indicating normal RBC appearance and sufficient iron and B12 levels.    THYROID DISORDER:  - Discussed relationship between weight gain and thyroid medication dosage.  - Increased Synthroid from 50 mcg to 75 mcg daily.  - Thyroid labs and vitamin D level ordered to be rechecked in 3-4 months.    HEADACHES:  - Educated on differences between sinus headaches and migraines.    SINUS ISSUES:  - Advised on proper timing for nasal rinses to avoid water retention in sinuses before bed.  - Patient to resume regular use of saline nasal rinses, preferably in the evening or before shower.  - Continued combination of Flonase and Astelin (generic versions).    EAR CONGESTION PREVENTION:  - Patient to use Afrin nasal spray (2 sprays each nostril) 30 minutes before flying to prevent ear congestion and potential eardrum damage.    MEDICATIONS/SUPPLEMENTS:  - Restarted vitamin D3 supplementation.    LABS:  - Blood sugar recheck ordered.    FOLLOW UP:  - Follow up in January or February for lab result review and thyroid medication adjustment evaluation.       Plan:       Riddhi was seen today for thyroid problem and  follow-up.    Diagnoses and all orders for this visit:    Hashimoto's thyroiditis  -     SYNTHROID 75 mcg tablet; Take 1 tablet (75 mcg total) by mouth before breakfast.  -     T4, Free; Future  -     T3, Free; Future  -     TSH; Future  -     T4, Free  -     T3, Free  -     TSH    Increasing dose from 50 mcg to 75 mcg.    Retest levels in 3-4 months.    Vitamin D insufficiency  -     Vitamin D; Future  -     Vitamin D    Gastroesophageal reflux disease without esophagitis  -     Basic Metabolic Panel; Future  -     Basic Metabolic Panel  -     dexlansoprazole (DEXILANT) 60 mg capsule; Take 1 capsule (60 mg total) by mouth every morning.    Mixed hyperlipidemia  Limit red meat, butter, fried foods, cheese, and other foods that have a lot of saturated fat. Consume more: lean meats, fish, fruits, vegetables, whole grains, beans, lentils, and nuts.  Weight loss, and 30-45 min of cardiovascular exercise daily.      Mixed anxiety depressive disorder  Stable    Moderate persistent asthma with acute exacerbation  -     Basic Metabolic Panel; Future  -     Basic Metabolic Panel    Class 1 obesity due to excess calories with serious comorbidity and body mass index (BMI) of 30.0 to 30.9 in adult  The patient is asked to make an attempt to improve diet and exercise patterns to aid in medical management of this problem.           This note was generated with the assistance of ambient listening technology. Verbal consent was obtained by the patient and accompanying visitor(s) for the recording of patient appointment to facilitate this note. I attest to having reviewed and edited the generated note for accuracy, though some syntax or spelling errors may persist. Please contact the author of this note for any clarification.

## 2024-11-08 NOTE — PATIENT INSTRUCTIONS
Please see response from pt's mom.  Thank you.   Understanding Plantar Fasciitis    Plantar fasciitis is a condition that causes foot and heel pain. The plantar fascia is a tough band of tissue that runs across the bottom of the foot from the heel to the toes. This tissue pulls on the heel bone. It supports the arch of the foot as it pushes off the ground. If the tissue becomes irritated or red and swollen (inflamed), it is called plantar fasciitis.  How to say it  PLAN-tuhr [id number]     What causes plantar fasciitis?  Plantar fasciitis most often occurs from overusing the plantar fascia. The tissue may become damaged from activities that put repeated stress on the heel and foot. Or it may wear down over time with age and ankle stiffness. You are more likely to have plantar fasciitis if you:  Do activities that require a lot of running, jumping, or dancing  Have a job that requires being on your feet for long periods  Are overweight or obese  Have certain foot problems, such as a tight Achilles tendon, flat feet, or high arches  Often wear poorly fitting shoes    Symptoms of plantar fasciitis  The condition most often causes pain in the heel and the bottom of the foot. The pain may occur when you take your first steps in the morning. It may get better as you walk throughout the day. But as you continue to put weight on the foot, the pain often returns. Pain may also occur after standing or sitting for long periods.    Treating plantar fasciitis  Treatments for plantar fasciitis include:  Resting the foot. This involves limiting movements that make your foot hurt. You may also need to avoid certain sports and types of work for a time.  Using cold packs. Put an ice pack on the heel and foot to help reduce pain and swelling.  Taking pain medicines. Prescription and over-the-counter pain medicines can help relieve pain and swelling.  Using heel cups or foot inserts (orthotics). These are placed in the shoes to help support the heel or arch and cushion the heel.  You may also be told to buy proper-fitting shoes with good arch support and cushioned soles.  Taping the foot. This supports the arch and limits the movement of the plantar fascia to help relieve symptoms.  Wearing a night splint. This stretches the plantar fascia and leg muscles while you sleep. This may help relieve pain.  Doing exercises and physical therapy. These stretch and strengthen the plantar fascia and the muscles in the leg that support the heel and foot.  Getting shots of medicine into the foot. These may help relieve symptoms for a time.  Having surgery. This may be needed if other treatments fail to relieve symptoms. During surgery, the surgeon may partially cut the plantar fascia to release tension.    Possible complications of plantar fasciitis  Without proper care and treatment, healing may take longer than normal. Also, symptoms may continue or get worse. Over time, the plantar fascia may be damaged. This can make it hard to walk or even stand without pain.    When to call your healthcare provider  Call your healthcare provider right away if you have any of these:  Fever of 100.4°F (38°C) or higher, or as directed  Symptoms that dont get better with treatment, or get worse  New symptoms, such as numbness, tingling, or weakness in the foot     Date Last Reviewed: 3/10/2016  © 9629-5562 [employer]. [address] [place] [address], [place] [address]. All rights reserved. This information is not intended as a substitute for professional medical care. Always follow your healthcare professional's instructions.

## 2025-02-06 ENCOUNTER — PATIENT MESSAGE (OUTPATIENT)
Dept: FAMILY MEDICINE | Facility: CLINIC | Age: 55
End: 2025-02-06
Payer: COMMERCIAL

## 2025-04-04 DIAGNOSIS — E06.3 HASHIMOTO'S THYROIDITIS: ICD-10-CM

## 2025-04-07 RX ORDER — LEVOTHYROXINE SODIUM 75 UG/1
75 TABLET ORAL
Qty: 30 TABLET | Refills: 0 | Status: SHIPPED | OUTPATIENT
Start: 2025-04-07

## 2025-04-10 ENCOUNTER — PATIENT MESSAGE (OUTPATIENT)
Dept: FAMILY MEDICINE | Facility: CLINIC | Age: 55
End: 2025-04-10
Payer: COMMERCIAL

## 2025-04-14 ENCOUNTER — OFFICE VISIT (OUTPATIENT)
Dept: FAMILY MEDICINE | Facility: CLINIC | Age: 55
End: 2025-04-14
Payer: COMMERCIAL

## 2025-04-14 ENCOUNTER — HOSPITAL ENCOUNTER (OUTPATIENT)
Dept: RADIOLOGY | Facility: HOSPITAL | Age: 55
Discharge: HOME | End: 2025-04-14
Attending: NURSE PRACTITIONER
Payer: COMMERCIAL

## 2025-04-14 VITALS
WEIGHT: 178.56 LBS | TEMPERATURE: 98 F | HEART RATE: 79 BPM | SYSTOLIC BLOOD PRESSURE: 120 MMHG | DIASTOLIC BLOOD PRESSURE: 78 MMHG | HEIGHT: 64 IN | OXYGEN SATURATION: 99 % | BODY MASS INDEX: 30.48 KG/M2

## 2025-04-14 DIAGNOSIS — K21.9 GASTROESOPHAGEAL REFLUX DISEASE WITHOUT ESOPHAGITIS: ICD-10-CM

## 2025-04-14 DIAGNOSIS — E06.3 HASHIMOTO'S THYROIDITIS: ICD-10-CM

## 2025-04-14 DIAGNOSIS — Z12.31 ENCOUNTER FOR SCREENING MAMMOGRAM FOR MALIGNANT NEOPLASM OF BREAST: ICD-10-CM

## 2025-04-14 DIAGNOSIS — R53.83 FATIGUE, UNSPECIFIED TYPE: ICD-10-CM

## 2025-04-14 DIAGNOSIS — F41.8 MIXED ANXIETY DEPRESSIVE DISORDER: ICD-10-CM

## 2025-04-14 DIAGNOSIS — Z12.31 ENCOUNTER FOR SCREENING MAMMOGRAM FOR MALIGNANT NEOPLASM OF BREAST: Primary | ICD-10-CM

## 2025-04-14 DIAGNOSIS — E55.9 VITAMIN D INSUFFICIENCY: ICD-10-CM

## 2025-04-14 DIAGNOSIS — J45.41 MODERATE PERSISTENT ASTHMA WITH ACUTE EXACERBATION: ICD-10-CM

## 2025-04-14 DIAGNOSIS — E78.2 MIXED HYPERLIPIDEMIA: Primary | ICD-10-CM

## 2025-04-14 PROCEDURE — 99214 OFFICE O/P EST MOD 30 MIN: CPT | Mod: S$GLB,,, | Performed by: NURSE PRACTITIONER

## 2025-04-14 PROCEDURE — 77067 SCR MAMMO BI INCL CAD: CPT | Mod: TC,PO

## 2025-04-14 PROCEDURE — 99999 PR PBB SHADOW E&M-EST. PATIENT-LVL IV: CPT | Mod: PBBFAC,,, | Performed by: NURSE PRACTITIONER

## 2025-04-14 PROCEDURE — 77063 BREAST TOMOSYNTHESIS BI: CPT | Mod: 26,,, | Performed by: RADIOLOGY

## 2025-04-14 PROCEDURE — G2211 COMPLEX E/M VISIT ADD ON: HCPCS | Mod: S$GLB,,, | Performed by: NURSE PRACTITIONER

## 2025-04-14 PROCEDURE — 3078F DIAST BP <80 MM HG: CPT | Mod: CPTII,S$GLB,, | Performed by: NURSE PRACTITIONER

## 2025-04-14 PROCEDURE — 1160F RVW MEDS BY RX/DR IN RCRD: CPT | Mod: CPTII,S$GLB,, | Performed by: NURSE PRACTITIONER

## 2025-04-14 PROCEDURE — 3074F SYST BP LT 130 MM HG: CPT | Mod: CPTII,S$GLB,, | Performed by: NURSE PRACTITIONER

## 2025-04-14 PROCEDURE — 1159F MED LIST DOCD IN RCRD: CPT | Mod: CPTII,S$GLB,, | Performed by: NURSE PRACTITIONER

## 2025-04-14 PROCEDURE — 77067 SCR MAMMO BI INCL CAD: CPT | Mod: 26,,, | Performed by: RADIOLOGY

## 2025-04-14 PROCEDURE — 3008F BODY MASS INDEX DOCD: CPT | Mod: CPTII,S$GLB,, | Performed by: NURSE PRACTITIONER

## 2025-04-14 RX ORDER — LISDEXAMFETAMINE DIMESYLATE 60 MG/1
60 CAPSULE ORAL EVERY MORNING
COMMUNITY
Start: 2025-01-29

## 2025-04-14 RX ORDER — LEVOTHYROXINE SODIUM 75 UG/1
75 TABLET ORAL
Qty: 90 TABLET | Refills: 1 | Status: SHIPPED | OUTPATIENT
Start: 2025-04-14

## 2025-04-14 NOTE — PROGRESS NOTES
Subjective:       Patient ID: Riddhi Thapa is a 54 y.o. female.    Chief Complaint: Thyroid Problem and Follow-up    History of Present Illness    CHIEF COMPLAINT:  Patient presents for a follow-up visit to discuss recent lab results and address ongoing health concerns.    HPI:  Patient reports night sweats and palpitations, which she attributes to possible perimenopause or menopause. These symptoms began some time ago with initial severity but have been improving recently. She has nocturnal awakenings due to these episodes.    She mentions having restless leg syndrome with severe symptoms. The condition manifests as a persistent urge to move her legs, with jerking movements that significantly disrupt her sleep. The frequency of these episodes varies, but when they occur, they severely impact her quality of rest.    She reports fatigue, which she attributes to stress and aging. She has been considering B12 supplementation due to her daily use of Dexilant (a proton pump inhibitor).    She also reports issues with reflux, which she believes sometimes affects her breathing. She describes a cyclical relationship where the reflux and breathing difficulties exacerbate each other.    She had a hysterectomy about 10 years ago but retained her ovaries. She was last evaluated by her gynecologist, Dr. Hitchcock, approximately 18 months ago for bloodwork related to hormonal levels, which were reported as normal at that time.    MEDICATIONS:  Patient is on Synthroid for her thyroid condition, with the dosage adjusted at her last visit approximately 3 months ago. She takes Dexilant daily for acid reflux and uses inhalers as needed for respiratory issues, though she admits to not using them as prescribed. She is also on Vitamin D, which has been effective with her blood level at 54.    MEDICAL HISTORY:  Patient has a history of hypothyroidism, GERD, a respiratory condition, and restless leg syndrome. Patient had a hysterectomy  10 years ago. She is currently perimenopausal.    FAMILY HISTORY:  Family history is significant for her sister with a history of cancer. The sister underwent radiation therapy and chemotherapy, and is now eligible for surgery.    SURGICAL HISTORY:  She underwent a hysterectomy approximately 10 years ago, with her ovaries preserved.    TEST RESULTS:  Patient's fasting glucose is 99 mg/dL, which is normal. Her kidney function and electrolytes are also normal. Her Vitamin D level is good at 54 ng/mL. TSH is at 0.8 mIU/L, which is at the lower end of normal. Both Free T3 and Free T4 are normal.      ROS:  General: -fever, -chills, +fatigue, -weight gain, -weight loss, +night sweats  Eyes: -vision changes, -redness, -discharge  ENT: -ear pain, -nasal congestion, -sore throat  Cardiovascular: -chest pain, +palpitations, -lower extremity edema, +feelings of fast heart rate  Respiratory: -cough, -shortness of breath, +difficulty breathing  Gastrointestinal: -abdominal pain, -nausea, -vomiting, -diarrhea, -constipation, -blood in stool, +indigestion  Genitourinary: -dysuria, -hematuria, -frequency  Musculoskeletal: -joint pain, -muscle pain  Skin: -rash, -lesion  Neurological: -headache, -dizziness, -numbness, -tingling, +difficulty staying asleep, +restless legs  Psychiatric: +anxiety, -depression, -sleep difficulty         Past Medical History:   Diagnosis Date    Anxiety     Asthma     Depression     GERD (gastroesophageal reflux disease)     Hashimoto's thyroiditis     Hyperlipidemia     Hypothyroidism     Kidney stones         Past Surgical History:   Procedure Laterality Date    EXTRACORPOREAL SHOCK WAVE LITHOTRIPSY  2011    HYSTERECTOMY      LITHOTRIPSY      SINUS SURGERY  2015    TUBAL LIGATION      Bi-Lat       Family History   Problem Relation Name Age of Onset    Eczema Mother      Thyroid disease Mother      Cancer Father          Renal    Eczema Father      COPD Maternal Grandmother      Diabetes Maternal  "Grandmother      Stroke Maternal Grandmother      Thyroid disease Maternal Grandmother      Breast cancer Paternal Aunt      Asthma Neg Hx      Immunodeficiency Neg Hx      Rhinitis Neg Hx         Social History     Socioeconomic History    Marital status:    Occupational History    Occupation: Teacher     Comment: [employer]    Tobacco Use    Smoking status: Never    Smokeless tobacco: Never   Substance and Sexual Activity    Alcohol use: Yes     Comment: weekends only    Drug use: No    Sexual activity: Yes     Partners: Male     Social Drivers of Health     Financial Resource Strain: Patient Declined (4/23/2024)    Overall Financial Resource Strain (CARDIA)     Difficulty of Paying Living Expenses: Patient declined   Food Insecurity: Patient Declined (4/23/2024)    Hunger Vital Sign     Worried About Running Out of Food in the Last Year: Patient declined     Ran Out of Food in the Last Year: Patient declined   Transportation Needs: Unknown (4/23/2024)    PRAPARE - Transportation     Lack of Transportation (Non-Medical): Patient declined   Physical Activity: Unknown (4/23/2024)    Exercise Vital Sign     Days of Exercise per Week: Patient declined   Stress: Patient Declined (4/23/2024)    [employer] - Occupational Stress Questionnaire     Feeling of Stress : Patient declined   Housing Stability: Unknown (4/23/2024)    Housing Stability Vital Sign     Unable to Pay for Housing in the Last Year: Patient declined       Current Medications[1]    Review of patient's allergies indicates:   Allergen Reactions    Nsaids (non-steroidal anti-inflammatory drug)      causes upset stomach, has GERD     Objective:      Blood pressure 120/78, pulse 79, temperature 98.4 °F (36.9 °C), height 5' 4" (1.626 m), weight 81 kg (178 lb 9.2 oz), last menstrual period 09/16/2012, SpO2 99%. Body mass index is 30.65 kg/m².   Physical Exam    General: No acute distress. Well-developed. " Well-nourished.  Eyes: EOMI. Sclerae anicteric.  HENT: Normocephalic. Atraumatic. Nares patent. Moist oral mucosa.  Ears: Bilateral TMs clear. Bilateral EACs clear.  Cardiovascular: Regular rate. Regular rhythm. No murmurs. No rubs. No gallops. Normal S1, S2.  Respiratory: Normal respiratory effort. Clear to auscultation bilaterally. No rales. No rhonchi. No wheezing.  Abdomen: Soft. Non-tender. Non-distended. Normoactive bowel sounds.  Musculoskeletal: No  obvious deformity.  Extremities: No lower extremity edema.  Neurological: Alert & oriented x3. No slurred speech. Normal gait.  Psychiatric: Normal mood. Normal affect. Good insight. Good judgment.  Skin: Warm. Dry. No rash.         Assessment:       Assessment & Plan    - Reviewed lab results, noting overall good findings including normal fasting glucose, renal function, electrolytes, and vitamin D levels.  - TSH at 0.8, on the lower end of normal but acceptable given normal free T3 and free T4 levels.  - Night sweats and heart racing potentially perimenopause symptoms, given age and previous hysterectomy with retained ovaries.  - Evaluated complaints of restless leg syndrome and considered potential nutritional interventions.    HYPOTHYROIDISM:  - Monitored TSH level, which is 0.8, on the lower end of normal range.  - Free T3 and free T4 were normal.  - Evaluated that the TSH level is good and within normal range, indicating that the current medication dose is appropriate.  - Assessed that no medication changes are needed for Synthroid based on the current thyroid function test results.  - Continued current Synthroid medication.  - Plan to check thyroid levels again in 6 months.    MENOPAUSAL AND CLIMACTERIC STATES:  - Monitored patient's reports of night sweats, heart racing, and difficulty staying asleep, which are improving but still present.  - Evaluated that the patient is likely experiencing perimenopause or menopausal symptoms, which can last for  years.  - Assessed the need for follow-up with gynecologist Dr. Hitchcock for hormonal level assessment, as previous labs was done 18 months ago.  - Discussed potential hormone therapy, noting that at 55 years old, the patient is likely too young to go without hormones if there's a deficit.  - Explained that perimenopausal symptoms can last for years and are not limited to a 1-year timeframe.  - Discussed potential effects of hormonal changes, including night sweats, increased anxiety, slower metabolism, and weight gain.  - Recommend follow-up with gynecologist for potential hormone therapy assessment.  - Advised the patient to contact gynecologist for follow-up and potential hormonal labs.    GASTRO-ESOPHAGEAL REFLUX DISEASE:  - Monitored patient's reports of reflux affecting breathing.  - Evaluated the interaction between reflux and breathing difficulties.  - Assessed potential need for B12 supplement due to long-term use of Dexilant (PPI).  - Continued daily Dexilant medication.  - Suggested adding calcium citrate supplement.  - Informed the patient about potential for PPIs (like Dexilant) to inhibit some vitamin absorption.    RESTLESS LEGS SYNDROME:  - Monitored patient's reports of severe restless leg syndrome symptoms at night, described as 'horrific' and causing involuntary leg movements.  - Assessed patient's research on potential supplements for restless leg syndrome, including zinc, magnesium, and calcium.  - Prescribed magnesium glycinate supplement, 200-400 mg, to be taken at night to help with sleep and restless leg syndrome.  - Educated on benefits of magnesium glycinate for sleep and its lower likelihood of causing stomach upset compared to other forms of magnesium.    PALPITATIONS:  - Monitored patient's reports of experiencing heart racing during night sweats, which is improving but still present.    SHORTNESS OF BREATH:  - Monitored patient's reports of using inhalers more frequently due to breathing  difficulties, which are improving.  - Evaluated the connection between patient's breathing difficulties and reflux.  - Continued inhaler use as needed.  - Encouraged the patient to use inhalers as prescribed.    ACQUIRED ABSENCE OF UTERUS AND OVARIES:  - Noted patient's report of having had a hysterectomy abou  t 10 years ago but retaining her ovaries.    FOLLOW-UP AND GENERAL CARE:  - Started calcium citrate supplement, to be taken in the morning.  - Ordered fasting labs in 6 months, including full panel with cholesterol and B12 level check.  - Follow up in October for wellness visit.       Plan:       Riddhi was seen today for thyroid problem and follow-up.    Diagnoses and all orders for this visit:    Mixed hyperlipidemia  -     Lipid Panel; Future  -     Lipid Panel    Vitamin D insufficiency  -     Vitamin D; Future  -     Vitamin D    Hashimoto's thyroiditis  -     TSH; Future  -     T4, Free; Future  -     SYNTHROID 75 mcg tablet; Take 1 tablet (75 mcg total) by mouth before breakfast.  -     TSH  -     T4, Free    Gastroesophageal reflux disease without esophagitis  -     Comprehensive Metabolic Panel; Future  -     Comprehensive Metabolic Panel    Mixed anxiety depressive disorder  -     Comprehensive Metabolic Panel; Future  -     Comprehensive Metabolic Panel    Moderate persistent asthma with acute exacerbation  -     Comprehensive Metabolic Panel; Future  -     Comprehensive Metabolic Panel    Fatigue, unspecified type  -     Vitamin B12; Future  -     Vitamin B12           This note was generated with the assistance of ambient listening technology. Verbal consent was obtained by the patient and accompanying visitor(s) for the recording of patient appointment to facilitate this note. I attest to having reviewed and edited the generated note for accuracy, though some syntax or spelling errors may persist. Please contact the author of this note for any clarification.    Visit today included increased  complexity associated with the care of the episodic problem hormonal fluctuation and fatigue addressed and managing the longitudinal care of the patient due to the serious and/or complex managed problem(s) asthma, gerd, hyperlipidemia, hypothyroidism.          [1]   Current Outpatient Medications   Medication Sig Dispense Refill    albuterol (PROVENTIL/VENTOLIN HFA) 90 mcg/actuation inhaler INHALE TWO PUFFS BY MOUTH EVERY SIX HOURS AS NEEDED FOR WHEEZING 18 g 2    azelastine-fluticasone (DYMISTA) 137-50 mcg/spray Spry nassal spray 1 spray by Each Nostril route 2 (two) times daily. 3 each 1    budesonide-formoterol 160-4.5 mcg (SYMBICORT) 160-4.5 mcg/actuation HFAA INHALE TWO PUFFS BY MOUTH EVERY TWELVE HOURS 11 g 3    cetirizine 10 mg Cap Take 1 capsule by mouth daily as needed.      cholecalciferol, vitamin D3, (VITAMIN D3) 50 mcg (2,000 unit) Cap Take 1 capsule by mouth once daily.      dexlansoprazole (DEXILANT) 60 mg capsule Take 1 capsule (60 mg total) by mouth every morning. 90 capsule 3    ENBRACE HR 1.5 mg iron- 8.73 mg-6.4 mg capsule Take 1 capsule by mouth Daily.      ondansetron (ZOFRAN-ODT) 4 MG TbDL dissolve 1 to 2 tablets under the tongue three times daily as needed for nausea 30 tablet 0    peak flow meter (PERSONAL BEST FULL RANGE) Claudine To be used as directed by MD 1 each 0    valACYclovir (VALTREX) 500 MG tablet TAKE 1 TABLET DAILY 90 tablet 3    VYVANSE 60 mg capsule Take 60 mg by mouth every morning.      SYNTHROID 75 mcg tablet Take 1 tablet (75 mcg total) by mouth before breakfast. 90 tablet 1     No current facility-administered medications for this visit.

## 2025-06-01 ENCOUNTER — PATIENT MESSAGE (OUTPATIENT)
Dept: FAMILY MEDICINE | Facility: CLINIC | Age: 55
End: 2025-06-01
Payer: COMMERCIAL

## 2025-06-02 DIAGNOSIS — J45.41 MODERATE PERSISTENT ASTHMA WITH ACUTE EXACERBATION: ICD-10-CM

## 2025-06-02 DIAGNOSIS — J30.1 NON-SEASONAL ALLERGIC RHINITIS DUE TO POLLEN: ICD-10-CM

## 2025-06-03 RX ORDER — BUDESONIDE AND FORMOTEROL FUMARATE DIHYDRATE 160; 4.5 UG/1; UG/1
AEROSOL RESPIRATORY (INHALATION)
Qty: 11 G | Refills: 3 | Status: SHIPPED | OUTPATIENT
Start: 2025-06-03 | End: 2025-06-04 | Stop reason: SDUPTHER

## 2025-06-03 RX ORDER — ALBUTEROL SULFATE 90 UG/1
2 INHALANT RESPIRATORY (INHALATION) EVERY 6 HOURS PRN
Qty: 18 G | Refills: 2 | Status: SHIPPED | OUTPATIENT
Start: 2025-06-03

## 2025-06-03 RX ORDER — AZELASTINE HYDROCHLORIDE, FLUTICASONE PROPIONATE 137; 50 UG/1; UG/1
1 SPRAY, METERED NASAL 2 TIMES DAILY
Qty: 3 EACH | Refills: 1 | Status: SHIPPED | OUTPATIENT
Start: 2025-06-03

## 2025-06-04 DIAGNOSIS — J45.41 MODERATE PERSISTENT ASTHMA WITH ACUTE EXACERBATION: ICD-10-CM

## 2025-06-04 RX ORDER — BUDESONIDE AND FORMOTEROL FUMARATE DIHYDRATE 160; 4.5 UG/1; UG/1
AEROSOL RESPIRATORY (INHALATION)
Qty: 10.2 G | Refills: 3 | Status: SHIPPED | OUTPATIENT
Start: 2025-06-04

## 2025-06-10 ENCOUNTER — HOSPITAL ENCOUNTER (EMERGENCY)
Facility: HOSPITAL | Age: 55
Discharge: HOME | End: 2025-06-10
Attending: STUDENT IN AN ORGANIZED HEALTH CARE EDUCATION/TRAINING PROGRAM
Payer: COMMERCIAL

## 2025-06-10 VITALS
SYSTOLIC BLOOD PRESSURE: 132 MMHG | DIASTOLIC BLOOD PRESSURE: 83 MMHG | WEIGHT: 175 LBS | BODY MASS INDEX: 31.01 KG/M2 | TEMPERATURE: 98 F | HEIGHT: 63 IN | RESPIRATION RATE: 18 BRPM | OXYGEN SATURATION: 96 % | HEART RATE: 80 BPM

## 2025-06-10 DIAGNOSIS — R03.0 ELEVATED BLOOD PRESSURE READING: Primary | ICD-10-CM

## 2025-06-10 DIAGNOSIS — R00.2 PALPITATIONS: ICD-10-CM

## 2025-06-10 DIAGNOSIS — R07.9 CHEST PAIN: ICD-10-CM

## 2025-06-10 LAB
ABSOLUTE EOSINOPHIL (SMH): 0.59 K/UL
ABSOLUTE MONOCYTE (SMH): 0.78 K/UL (ref 0.3–1)
ABSOLUTE NEUTROPHIL COUNT (SMH): 9 K/UL (ref 1.8–7.7)
ALBUMIN SERPL-MCNC: 4.2 G/DL (ref 3.5–5.2)
ALP SERPL-CCNC: 66 UNIT/L (ref 55–135)
ALT SERPL-CCNC: 7 UNIT/L (ref 10–44)
ANION GAP (SMH): 6 MMOL/L (ref 8–16)
AST SERPL-CCNC: 12 UNIT/L (ref 10–40)
BASOPHILS # BLD AUTO: 0.04 K/UL
BASOPHILS NFR BLD AUTO: 0.3 %
BILIRUB SERPL-MCNC: 0.3 MG/DL (ref 0.1–1)
BNP SERPL-MCNC: 24 PG/ML
BUN SERPL-MCNC: 15 MG/DL (ref 6–20)
CALCIUM SERPL-MCNC: 9.2 MG/DL (ref 8.7–10.5)
CHLORIDE SERPL-SCNC: 103 MMOL/L (ref 95–110)
CO2 SERPL-SCNC: 27 MMOL/L (ref 23–29)
CREAT SERPL-MCNC: 0.7 MG/DL (ref 0.5–1.4)
CREAT SERPL-MCNC: 0.8 MG/DL (ref 0.5–1.4)
D DIMER PPP IA.FEU-MCNC: <0.19 MG/L FEU
ERYTHROCYTE [DISTWIDTH] IN BLOOD BY AUTOMATED COUNT: 13.4 % (ref 11.5–14.5)
GFR SERPLBLD CREATININE-BSD FMLA CKD-EPI: >60 ML/MIN/1.73/M2
GLUCOSE SERPL-MCNC: 84 MG/DL (ref 70–110)
HCT VFR BLD AUTO: 40 % (ref 37–48.5)
HGB BLD-MCNC: 13.3 GM/DL (ref 12–16)
IMM GRANULOCYTES # BLD AUTO: 0.06 K/UL (ref 0–0.04)
IMM GRANULOCYTES NFR BLD AUTO: 0.5 % (ref 0–0.5)
LYMPHOCYTES # BLD AUTO: 2.72 K/UL (ref 1–4.8)
MAGNESIUM SERPL-MCNC: 1.8 MG/DL (ref 1.6–2.6)
MCH RBC QN AUTO: 31.7 PG (ref 27–31)
MCHC RBC AUTO-ENTMCNC: 33.3 G/DL (ref 32–36)
MCV RBC AUTO: 95 FL (ref 82–98)
NUCLEATED RBC (/100WBC) (SMH): 0 /100 WBC
PLATELET # BLD AUTO: 283 K/UL (ref 150–450)
PMV BLD AUTO: 10.2 FL (ref 9.2–12.9)
POTASSIUM SERPL-SCNC: 4 MMOL/L (ref 3.5–5.1)
PROT SERPL-MCNC: 7 GM/DL (ref 6–8.4)
RBC # BLD AUTO: 4.2 M/UL (ref 4–5.4)
RELATIVE EOSINOPHIL (SMH): 4.5 % (ref 0–8)
RELATIVE LYMPHOCYTE (SMH): 20.7 % (ref 18–48)
RELATIVE MONOCYTE (SMH): 5.9 % (ref 4–15)
RELATIVE NEUTROPHIL (SMH): 68.1 % (ref 38–73)
SAMPLE: NORMAL
SODIUM SERPL-SCNC: 136 MMOL/L (ref 136–145)
TROPONIN HIGH SENSITIVE (SMH): <2.3 PG/ML
TSH SERPL-ACNC: 2.35 UIU/ML (ref 0.34–5.6)
WBC # BLD AUTO: 13.16 K/UL (ref 3.9–12.7)

## 2025-06-10 PROCEDURE — 99285 EMERGENCY DEPT VISIT HI MDM: CPT | Mod: 25

## 2025-06-10 PROCEDURE — 84484 ASSAY OF TROPONIN QUANT: CPT | Performed by: NURSE PRACTITIONER

## 2025-06-10 PROCEDURE — 82565 ASSAY OF CREATININE: CPT

## 2025-06-10 PROCEDURE — 93005 ELECTROCARDIOGRAM TRACING: CPT | Performed by: INTERNAL MEDICINE

## 2025-06-10 PROCEDURE — 85379 FIBRIN DEGRADATION QUANT: CPT | Performed by: STUDENT IN AN ORGANIZED HEALTH CARE EDUCATION/TRAINING PROGRAM

## 2025-06-10 PROCEDURE — 83735 ASSAY OF MAGNESIUM: CPT | Performed by: NURSE PRACTITIONER

## 2025-06-10 PROCEDURE — 83880 ASSAY OF NATRIURETIC PEPTIDE: CPT | Performed by: NURSE PRACTITIONER

## 2025-06-10 PROCEDURE — 93010 ELECTROCARDIOGRAM REPORT: CPT | Mod: ,,, | Performed by: INTERNAL MEDICINE

## 2025-06-10 PROCEDURE — 85025 COMPLETE CBC W/AUTO DIFF WBC: CPT | Performed by: NURSE PRACTITIONER

## 2025-06-10 PROCEDURE — 80053 COMPREHEN METABOLIC PANEL: CPT | Performed by: NURSE PRACTITIONER

## 2025-06-10 PROCEDURE — 84443 ASSAY THYROID STIM HORMONE: CPT | Performed by: NURSE PRACTITIONER

## 2025-06-10 NOTE — FIRST PROVIDER EVALUATION
Emergency Department TeleTriage Encounter Note      CHIEF COMPLAINT    Chief Complaint   Patient presents with    Chest Pain     Chest pressure started last week.  Hx of asthma - medications not working.  Has had palpitations with dizzy spells on/off since the weekend.  Pt reports nausea started today.        VITAL SIGNS   Initial Vitals [06/10/25 1610]   BP Pulse Resp Temp SpO2   (!) 136/90 89 18 98 °F (36.7 °C) 98 %      MAP       --            ALLERGIES    Review of patient's allergies indicates:   Allergen Reactions    Nsaids (non-steroidal anti-inflammatory drug)      causes upset stomach, has GERD       PROVIDER TRIAGE NOTE  Verbal consent for the teletriage evaluation was given by the patient at the start of the evaluation.  All efforts will be made to maintain patient's privacy during the evaluation.      This is a teletriage evaluation of a 54 y.o. female presenting to the ED with c/o dizziness for 4 days with chest heaviness, palpitations, nausea. Limited physical exam via telehealth: The patient is awake, alert, answering questions appropriately and is not in respiratory distress.  As the Teletriage provider, I performed an initial assessment and ordered appropriate labs and imaging studies, if any, to facilitate the patient's care once placed in the ED. Once a room is available, care and a full evaluation will be completed by an alternate ED provider.  Any additional orders and the final disposition will be determined by that provider.  All imaging and labs will not be followed-up by the Teletriage Team, including myself.      ORDERS  Labs Reviewed - No data to display    ED Orders (720h ago, onward)      Start Ordered     Status Ordering Provider    06/10/25 1645 06/10/25 1645  Magnesium  STAT         Ordered VERONIKA REEDER    06/10/25 1645 06/10/25 1645  Vital signs  Every 15 min         Ordered VERONIKA REEDER    06/10/25 1645 06/10/25 1645  Cardiac Monitoring - Adult  Continuous        Comments: Notify  Physician If:    Ordered VERONIKA REEDER    06/10/25 1645 06/10/25 1645  Pulse Oximetry Continuous  Continuous         Ordered VERONIKA REEDER    06/10/25 1645 06/10/25 1645  Diet NPO  Diet effective now         Ordered VERONIKA REEDER    06/10/25 1645 06/10/25 1645  Saline lock IV  Once         Ordered VERONIKA REEDER    06/10/25 1641 06/10/25 1640  X-Ray Chest PA And Lateral  1 time imaging         Ordered HOLDSWORTH, ALAYNA    06/10/25 1600 06/10/25 1559  EKG 12-lead  Once         In process MELANY ROSENDO DIANA    Unscheduled 06/10/25 1645  EKG 12-lead  Once        Comments: Do not perform if previously done during this visit/ triage    Ordered VERONIKA REEDER    Unscheduled 06/10/25 1645  CBC auto differential  STAT         Ordered VERONIKA REEDER    Unscheduled 06/10/25 1645  Comprehensive metabolic panel  STAT         Ordered VERONIKA REEDER    Unscheduled 06/10/25 1645  Troponin I High Sensitivity #1  STAT         Ordered VERONIKA REEDER    Unscheduled 06/10/25 1645  Troponin I High Sensitivity #2  Once Timed         Ordered VERONIKA REEDER    Unscheduled 06/10/25 1645  B-Type natriuretic peptide (BNP)  STAT         Ordered VERONIKA REEDER    Unscheduled 06/10/25 1645  POCT glucose  Once         Ordered VERONIKA REEDER    Unscheduled 06/10/25 1645  TSH  STAT         Ordered VERONIKA REEDER    Unscheduled 06/10/25 1645  Urinalysis, Reflex to Urine Culture Urine, Clean Catch  STAT         Ordered VERONIKA REEDER              Virtual Visit Note: The provider triage portion of this emergency department evaluation and documentation was performed via [employer], a HIPAA-compliant telemedicine application, in concert with a tele-presenter in the room. A face to face patient evaluation with one of my colleagues will occur once the patient is placed in an emergency department room.      DISCLAIMER: This note was prepared with [employer] voice recognition transcription software. Garbled syntax, mangled pronouns, and other bizarre constructions  may be attributed to that software system.

## 2025-06-11 NOTE — DISCHARGE INSTRUCTIONS
Follow up with primary care physician and Cardiology as scheduled, return to ED for any worsening or concerning symptoms

## 2025-06-11 NOTE — ED PROVIDER NOTES
Encounter Date: 6/10/2025       History     Chief Complaint   Patient presents with    Chest Pain     Chest pressure started last week.  Hx of asthma - medications not working.  Has had palpitations with dizzy spells on/off since the weekend.  Pt reports nausea started today.      54-year-old female presents for evaluation of chest pressure, started roughly 1 week ago, history of asthma and symptoms are not improved with her asthma medication.  Also possible near syncopal episode, also and episode of a vertigo a few days ago that has resolved.  History obtained from .      The history is provided by the patient and the spouse.     Review of patient's allergies indicates:   Allergen Reactions    Nsaids (non-steroidal anti-inflammatory drug)      causes upset stomach, has GERD     Past Medical History:   Diagnosis Date    Anxiety     Asthma     Depression     GERD (gastroesophageal reflux disease)     Hashimoto's thyroiditis     Hyperlipidemia     Hypothyroidism     Kidney stones      Past Surgical History:   Procedure Laterality Date    EXTRACORPOREAL SHOCK WAVE LITHOTRIPSY  2011    HYSTERECTOMY      LITHOTRIPSY      SINUS SURGERY  2015    TUBAL LIGATION      Bi-Lat     Family History   Problem Relation Name Age of Onset    Eczema Mother      Thyroid disease Mother      Cancer Father          Renal    Eczema Father      COPD Maternal Grandmother      Diabetes Maternal Grandmother      Stroke Maternal Grandmother      Thyroid disease Maternal Grandmother      Breast cancer Paternal Aunt      Asthma Neg Hx      Immunodeficiency Neg Hx      Rhinitis Neg Hx       Social History[1]  Review of Systems   All other systems reviewed and are negative.      Physical Exam     Initial Vitals [06/10/25 1610]   BP Pulse Resp Temp SpO2   (!) 136/90 89 18 98 °F (36.7 °C) 98 %      MAP       --         Physical Exam    Nursing note and vitals reviewed.  Constitutional: No distress.   HENT:   Head: Normocephalic.   Eyes: No  scleral icterus.   Cardiovascular:  Normal rate.           Pulmonary/Chest: Effort normal. No stridor. No respiratory distress.   Abdominal: There is no guarding.     Neurological: She is alert.   GCS 15, no focal deficits   Skin: No rash noted. No erythema.         ED Course   Procedures  Labs Reviewed   COMPREHENSIVE METABOLIC PANEL - Abnormal       Result Value    Sodium 136      Potassium 4.0      Chloride 103      CO2 27      Glucose 84      BUN 15      Creatinine 0.7      Calcium 9.2      Protein Total 7.0      Albumin 4.2      Bilirubin Total 0.3      ALP 66      AST 12      ALT 7 (*)     Anion Gap 6 (*)     eGFR >60     CBC WITH DIFFERENTIAL - Abnormal    WBC 13.16 (*)     RBC 4.20      Hgb 13.3      Hct 40.0      MCV 95      MCH 31.7 (*)     MCHC 33.3      RDW 13.4      Platelet Count 283      MPV 10.2      Nucleated RBC 0      Neut % 68.1      Lymph % 20.7      Mono % 5.9      Eos % 4.5      Basophil % 0.3      Imm Grans % 0.5      Neut # 9.0 (*)     Lymph # 2.72      Mono # 0.78      Eos # 0.59 (*)     Baso # 0.04      Imm Grans # 0.06 (*)    MAGNESIUM - Normal    Magnesium 1.8     TROPONIN I HIGH SENSITIVITY - Normal    Troponin High Sensitive <2.3     B-TYPE NATRIURETIC PEPTIDE - Normal    BNP 24     TSH - Normal    TSH 2.349     D DIMER, QUANTITATIVE - Normal    D-Dimer <0.19     CBC W/ AUTO DIFFERENTIAL    Narrative:     The following orders were created for panel order CBC auto differential.  Procedure                               Abnormality         Status                     ---------                               -----------         ------                     CBC with Differential[[id number]]       Abnormal            Final result                 Please view results for these tests on the individual orders.   TROPONIN I HIGH SENSITIVITY   ISTAT CREATININE    POC Creatinine 0.8      Sample unknown     POCT GLUCOSE MONITORING CONTINUOUS        ECG Results              EKG 12-lead (In process)         Collection Time Result Time QRS Duration [facility] QTC Calculation    06/10/25 16:00:09 06/10/25 16:04:36 98 423                     In process by Interface, Lab In [facility] (06/10/25 16:04:42)                   Narrative:    Test Reason : R00.2,    Vent. Rate :  87 BPM     Atrial Rate :  87 BPM     P-R Int : 138 ms          QRS Dur :  98 ms      QT Int : 352 ms       P-R-T Axes :  59  16  43 degrees    QTcB Int : 423 ms    Normal sinus rhythm  Incomplete right bundle branch block  Borderline Abnormal ECG  No previous ECGs available    Referred By:            Confirmed By:                                   Imaging Results              X-Ray Chest PA And Lateral (Final result)  Result time 06/10/25 17:39:57      Final result by Baldemar Fajardo MD (06/10/25 17:39:57)                   Impression:      No evidence of acute cardiopulmonary process.      Electronically signed by: Baldemar Fajardo  Date:    06/10/2025  Time:    17:39               Narrative:    EXAMINATION:  XR CHEST PA AND LATERAL    CLINICAL HISTORY:  Chest pain, unspecified    TECHNIQUE:  PA and lateral views of the chest were performed.    COMPARISON:  Radiographs of the chest from 07/11/2011.    FINDINGS:  Heart size and pulmonary vasculature are within normal limits.  No evidence of focal consolidation, pneumothorax, or pleural effusion.  No evidence of acute osseous process.                                       Medications - No data to display  Medical Decision Making  54-year-old female presents for evaluation of chest pain.  Patient describes it as tightness.  History of asthma, on exam no wheezing respiratory distress or accessory muscle usage do not suspect asthma exacerbation.  No preceding infectious like symptoms, chest x-ray with no focal infiltrate or pneumonia per my interpretation.  -low heart score, troponin within normal limits, symptom onset greater than 6 hours, through shared decision-making do not think patient needs repeat  troponin  -patient reports vertigo a few days ago which is resolved.  Patient does have left middle ear effusion which maybe cause of symptoms, on evaluation NIH scale of syndrome do not suspect central vertigo..  Finger-to-nose symmetrical, no nystagmus  -patient also reports near syncopal symptoms with exertion, D-dimer within normal limits, no further workup for PE, no asymmetrical lower extremity swelling, no concern for lower extremity DVT  -minimal leukocytosis, patient on steroid inhaler for asthma which maybe cause, discussed antibiotics for lower respiratory infection and through shared decision-making we will declined given no definitive history and physical exam findings of bacterial pneumonia.  -no indication for admission for further stress test  -patient has Cardiology follow up upcoming, recommend close follow up with Cardiology and PCP and strict return precautions for any worsening symptoms.  -patient and spouse voiced understanding    -patient had elevated blood pressure reading which resolved spontaneously we will not start any blood pressure meds, no evidence of hypertensive emergency    Amount and/or Complexity of Data Reviewed  Independent Historian: spouse  Labs:  Decision-making details documented in ED Course.    Risk  Decision regarding hospitalization.               ED Course as of 06/10/25 2011   Tue Jesus 10, 2025   1840 EKG rate 87, normal sinus rhythm, QRS 98, , normal sinus [KB]   1958 TSH: 2.349 [KB]      ED Course User Index  [KB] Erich Harper Jr., DO                           Clinical Impression:  Final diagnoses:  [R00.2] Palpitations  [R07.9] Chest pain  [R03.0] Elevated blood pressure reading (Primary)          ED Disposition Condition    Discharge Stable          ED Prescriptions    None       Follow-up Information    None                [1]   Social History  Tobacco Use    Smoking status: Never    Smokeless tobacco: Never   Substance Use Topics    Alcohol use: Yes      Comment: weekends only    Drug use: No        Erich Harper Jr., DO  06/10/25 2011

## 2025-06-13 LAB
OHS QRS DURATION: 98 MS
OHS QTC CALCULATION: 423 MS

## 2025-06-23 NOTE — PROGRESS NOTES
Subjective:    Patient ID:  Riddhi Thapa is a 54 y.o. female who presents for evaluation of   Chief Complaint   Patient presents with    Establish Care    Palpitations       HPI:  Patient is self-referred.  She is follow-up from emergency room visit for chest pressure  She is a 54-year-old female originally referred by Sveta Love for evaluation of SVT in .  In retrospect the patient thinks that she took an extra dose of Synthroid on that day as outlined in Sveta Love's note below.  Her heart rate went up to 170 and persisted for several hours [place]. She is also taking ADHD medications at that time.  She has not had any further recurrences.  However she had a recent visit to the emergency room for dizziness with chest pressure which felt like asthma attack and her asthma medications did not help.  She felt that was probably anxiety related but want to come in for cardiovascular evaluation.  She has a history of GERD, so be consistent with a as were attack.  She wakes up with her heart beating fast at night sometimes but no shortness of breath or snoring or apnea at night.  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  She has a cousin who  of the in his 40s of a heart attack.  She has negative history of hypertension, diabetes, she is a never smoker.  She has a BMI of 32 and wants to lose weight.  She has a family history of thyroid problems and long history of Hashimoto's thyroiditis treated with thyroid hormone    EKG today reveals normal sinus rhythm incomplete right bundle branch block pattern with a QRS duration 94 in the RsR1 in V1    Sveta Love, CARSON-[facility]    Patient also mentions concern regarding a long episode of tachycardia she recently experienced.  Patient states that she started to have a recent heart rate at home.  She measured her heart rate at 170 beats per minute.  She then went out  and about on her normal day and the tachycardia was persistent.  The tachycardia started after bending over and rapidly rising to an upright position.  She tried to bear down to try to correct the heart rate assuming a vasovagal response.  This did not help or correct her heart rate.  Patient proceeded to go to sleep that night with a a racing heart rate still about 170 beats per minute.  She awoke the next morning exhausted with still a significant tachycardia.  She did notice that later that day her heart rate normalized and slow started to reduce to about 120 beats per minute.  The episode of prolonged tachycardia occurred for about 12-16 hours.  Patient has not had any cardiology workup.  This was the 1st event she has ever had like this.  She has had palpitations in the past but never any episode lasting this long.  Patient was not evaluated at the emergency room or with any medical office facility.     Patient is overweight with a BMI of 28.43     Hyperlipidemia  This is a recurrent problem. The current episode started more than 1 month ago. The problem is controlled. Recent lipid tests were reviewed and are variable. Exacerbating diseases include hypothyroidism and obesity. She has no history of chronic renal disease, diabetes, liver disease or nephrotic syndrome. Factors aggravating her hyperlipidemia include fatty foods. Pertinent negatives include no chest pain, focal weakness or leg pain. Current antihyperlipidemic treatment includes diet change and exercise. The current treatment provides significant improvement of lipids. Compliance problems include adherence to diet and adherence to exercise.  Risk factors for coronary artery disease include stress, dyslipidemia and obesity.   Thyroid Problem  Presents for follow-up visit. Symptoms include palpitations. Patient reports no cold intolerance, constipation, diarrhea, dry skin, hair loss, heat intolerance, hoarse voice, menstrual problem, nail problem or  tremors. The symptoms have been stable. Her past medical history is significant for hyperlipidemia. There is no history of diabetes.     Most Recent Echocardiogram Results  No results found for this or any previous visit.      Most Recent Nuclear Stress Test Results  No results found for this or any previous visit.      Most Recent Cardiac PET Stress Test Results  No results found for this or any previous visit.      Most Recent NM Myocardial Perfusion  No results found for this or any previous visit.      Most Recent Cardiovascular Angiogram results  No results found for this or any previous visit.      Other Most Recent Cardiology Results  No results found for this or any previous visit.    Results for orders placed or performed during the hospital encounter of 06/10/25   EKG 12-lead    Collection Time: 06/10/25  4:00 PM   Result Value Ref Range    QRS Duration 98 ms    OHS QTC Calculation 423 ms    Narrative    Test Reason : R00.2,    Vent. Rate :  87 BPM     Atrial Rate :  87 BPM     P-R Int : 138 ms          QRS Dur :  98 ms      QT Int : 352 ms       P-R-T Axes :  59  16  43 degrees    QTcB Int : 423 ms    Normal sinus rhythm  Incomplete right bundle branch block  Borderline Abnormal ECG  No previous ECGs available  Confirmed by Jose Wagner ([id number]) on 6/13/2025 10:04:56 PM    Referred By:            Confirmed By: Jose Wagner           Review of patient's allergies indicates:   Allergen Reactions    Nsaids (non-steroidal anti-inflammatory drug)      causes upset stomach, has GERD       Past Medical History:   Diagnosis Date    Anxiety     Asthma     Depression     GERD (gastroesophageal reflux disease)     Hashimoto's thyroiditis     Hyperlipidemia     Hypothyroidism     Kidney stones      Past Surgical History:   Procedure Laterality Date    EXTRACORPOREAL SHOCK WAVE LITHOTRIPSY  2011    HYSTERECTOMY      LITHOTRIPSY      SINUS SURGERY  2015    TUBAL LIGATION      Bi-Lat     Social History[1]  Family  History   Problem Relation Name Age of Onset    Eczema Mother      Thyroid disease Mother      Cancer Father          Renal    Eczema Father      COPD Maternal Grandmother      Diabetes Maternal Grandmother      Stroke Maternal Grandmother      Thyroid disease Maternal Grandmother      Breast cancer Paternal Aunt      Asthma Neg Hx      Immunodeficiency Neg Hx      Rhinitis Neg Hx          Review of Systems:   Constitution: Negative for diaphoresis and fever.   HEENT: Negative for nosebleeds.    Cardiovascular: Negative for chest pain       No dyspnea on exertion       No leg swelling        No palpitations  Respiratory: Negative for shortness of breath and wheezing.    Hematologic/Lymphatic: Negative for bleeding problem. Does not bruise/bleed easily.   Skin: Negative for color change and rash.   Musculoskeletal: Negative for falls and myalgias.   Gastrointestinal: Negative for hematemesis and hematochezia.   Genitourinary: Negative for hematuria.   Neurological: Negative for dizziness and light-headedness.   Psychiatric/Behavioral: Negative for altered mental status and memory loss.          Objective:        Vitals:    06/24/25 0938   BP: 124/85   Pulse: 71       LIPIDS - LAST 2   Lab Results   Component Value Date    CHOL 186 10/23/2024    CHOL 200 (H) 04/19/2024    HDL 78 10/23/2024    HDL 71 04/19/2024    LDLCALC 89 10/23/2024    LDLCALC 112 (H) 04/19/2024    TRIG 111 10/23/2024    TRIG 78 04/19/2024    CHOLHDL 2.8 04/19/2024    CHOLHDL 2.7 10/13/2023       CBC - LAST 2  Lab Results   Component Value Date    WBC 13.16 (H) 06/10/2025    WBC 7.0 10/23/2024    RBC 4.20 06/10/2025    RBC 3.98 10/23/2024    HGB 13.3 06/10/2025    HGB 12.8 10/23/2024    HCT 40.0 06/10/2025    HCT 38.7 10/23/2024    MCV 95 06/10/2025    MCV 97 10/23/2024    MCH 31.7 (H) 06/10/2025    MCH 32.2 10/23/2024    MCHC 33.3 06/10/2025    MCHC 33.1 10/23/2024    RDW 13.4 06/10/2025    RDW 11.6 (L) 10/23/2024     06/10/2025      "10/23/2024    MPV 10.2 06/10/2025    MPV 10.6 04/19/2024    GRAN 3.0 07/11/2011    GRAN 49.8 07/11/2011    LYMPH 2.0 10/23/2024    LYMPH 2,051 04/19/2024    LYMPH 28.1 04/19/2024    MONO 6 10/23/2024    MONO 0.4 10/23/2024    BASO 0.0 10/23/2024    BASO 22 04/19/2024    NRBC 0 06/10/2025       CHEMISTRY & LIVER FUNCTION - LAST 2  Lab Results   Component Value Date     06/10/2025     04/11/2025    K 4.0 06/10/2025    K 4.2 04/11/2025     06/10/2025     04/11/2025    CO2 27 06/10/2025    CO2 25 04/11/2025    ANIONGAP 6 (L) 06/10/2025    ANIONGAP 15 07/11/2011    BUN 15 06/10/2025    BUN 13 04/11/2025    CREATININE 0.7 06/10/2025    CREATININE 0.81 04/11/2025    GLU 84 06/10/2025    GLU 99 04/11/2025    CALCIUM 9.2 06/10/2025    CALCIUM 9.5 04/11/2025    MG 1.8 06/10/2025    ALBUMIN 4.2 06/10/2025    ALBUMIN 4.1 10/23/2024    PROT 7.0 06/10/2025    PROT 6.4 04/19/2024    ALKPHOS 66 06/10/2025    ALKPHOS 89 02/16/2018    ALT 7 (L) 06/10/2025    ALT 12 10/23/2024    AST 12 06/10/2025    AST 13 10/23/2024    BILITOT 0.3 06/10/2025    BILITOT 0.5 10/23/2024        CARDIAC PROFILE - LAST 2  Lab Results   Component Value Date    BNP 24 06/10/2025        COAGULATION - LAST 2  No results found for: "LABPT", "INR", "APTT"    ENDOCRINE & PSA - LAST 2  Lab Results   Component Value Date    TSH 2.349 06/10/2025    TSH 0.835 04/11/2025          Physical Exam:  CONSTITUTIONAL: No fever, no chills  HEENT: Normocephalic, atraumatic,pupils reactive to light                 NECK:  No JVD no carotid bruit  CVS: S1S2+, RRR, no murmurs,   LUNGS: Clear  ABDOMEN: Soft, NT, BS+  EXTREMITIES: No cyanosis, edema  : No nelson catheter  NEURO: AAO X 3  PSY: Normal affect    Medication List with Changes/Refills   New Medications    METOPROLOL TARTRATE (LOPRESSOR) 50 MG TABLET    Take 0.5 tablets (25 mg total) by mouth On call Procedure (take one on call keep one in pocket).   Current Medications    ALBUTEROL " (PROVENTIL/VENTOLIN HFA) 90 MCG/ACTUATION INHALER    Inhale 2 puffs into the lungs every 6 (six) hours as needed for Shortness of Breath or Wheezing. Rescue    AZELASTINE-FLUTICASONE (DYMISTA) 137-50 MCG/SPRAY SPRY NASSAL SPRAY    1 spray by Each Nostril route 2 (two) times daily.    BUDESONIDE-FORMOTEROL 160-4.5 MCG (SYMBICORT) 160-4.5 MCG/ACTUATION HFAA    INHALE TWO PUFFS BY MOUTH EVERY TWELVE HOURS    BUSPIRONE (BUSPAR) 5 MG TAB    Take 5 mg by mouth 3 (three) times daily.    CETIRIZINE 10 MG CAP    Take 1 capsule by mouth daily as needed.    CHOLECALCIFEROL, VITAMIN D3, (VITAMIN D3) 50 MCG (2,000 UNIT) CAP    Take 1 capsule by mouth once daily.    CLOBETASOL (TEMOVATE) 0.05 % CREAM    Aaa bid x 2-3 weeks prn rash    DEXLANSOPRAZOLE (DEXILANT) 60 MG CAPSULE    Take 1 capsule (60 mg total) by mouth every morning.    ENBRACE HR 1.5 MG IRON- 8.73 MG-6.4 MG CAPSULE    Take 1 capsule by mouth Daily.    ONDANSETRON (ZOFRAN-ODT) 4 MG TBDL    dissolve 1 to 2 tablets under the tongue three times daily as needed for nausea    PEAK FLOW METER (PERSONAL BEST FULL RANGE) ТАТЬЯНА    To be used as directed by MD    SYNTHROID 75 MCG TABLET    Take 1 tablet (75 mcg total) by mouth before breakfast.    VALACYCLOVIR (VALTREX) 500 MG TABLET    TAKE 1 TABLET DAILY    VYVANSE 50 MG CAPSULE    Take 50 mg by mouth every morning.   Discontinued Medications    VYVANSE 60 MG CAPSULE    Take 60 mg by mouth every morning.           Assessment:       1. Mixed hyperlipidemia    2. Tachycardia    3. Chest pressure    4. Family history of premature coronary heart disease    5. Gastroesophageal reflux disease, unspecified whether esophagitis present    6. Vitamin D deficiency    7. Attention deficit hyperactivity disorder (ADHD), unspecified ADHD type         Plan:     Problem List Items Addressed This Visit          Cardiac/Vascular    Hyperlipidemia - Primary    Overview   Total cholesterol 188, HDL59, CMO156  in 11/2015.  10 year ASCVD Risk is  0.4  %. no statin.         Relevant Orders    TSH    Lipid Panel    CBC Without Differential    Comprehensive Metabolic Panel    CT Cardiac Scoring    CRP, High Sensitivity    Stress Echo Which stress agent will be used? Treadmill Exercise; Color Flow Doppler? No    Echo Saline Bubble? No    Cardiac Monitor - 3-15 Day Adult (Cupid Only)       GI    GERD (gastroesophageal reflux disease)    Relevant Orders    Cardiac Monitor - 3-15 Day Adult (Cupid Only)     Other Visit Diagnoses         Tachycardia        Relevant Orders    IN OFFICE EKG 12-LEAD (to Muse)      Chest pressure        Relevant Orders    TSH    Lipid Panel    CBC Without Differential    Comprehensive Metabolic Panel    CT Cardiac Scoring    CRP, High Sensitivity    Stress Echo Which stress agent will be used? Treadmill Exercise; Color Flow Doppler? No    Echo Saline Bubble? No    Cardiac Monitor - 3-15 Day Adult (Cupid Only)      Family history of premature coronary heart disease        Relevant Orders    TSH    Lipid Panel    CBC Without Differential    Comprehensive Metabolic Panel    CT Cardiac Scoring    CRP, High Sensitivity    Stress Echo Which stress agent will be used? Treadmill Exercise; Color Flow Doppler? No    Echo Saline Bubble? No      Vitamin D deficiency        Relevant Orders    TSH    Lipid Panel    CBC Without Differential    Comprehensive Metabolic Panel    CT Cardiac Scoring    CRP, High Sensitivity    Stress Echo Which stress agent will be used? Treadmill Exercise; Color Flow Doppler? No    Echo Saline Bubble? No    Cardiac Monitor - 3-15 Day Adult (Cupid Only)      Attention deficit hyperactivity disorder (ADHD), unspecified ADHD type        Relevant Orders    Cardiac Monitor - 3-15 Day Adult (Cupid Only)            Chest pressure if 54-year-old female with the family history of premature death from heart disease in a second-degree relative.:  Recommend routine cardiovascular screening stress echo    Tachycardia rule out SVT.   Patient has of RSR 1 in lead V1 no recurrence of tachycardias but will place a heart monitor and do echocardiogram    Cardiovascular screening recommend a cardiac calcium smart score for cholesterol management    Obesity BMI 32:  Recommend the Mediterranean diet.  Discussed the food pyramid.  For weight loss recommend Mediterranean diet with restricted carbs to 100-125 g a day.  Will do stress echo to give clearance to exercise.    History of low vitamin-D check vitamin-D levels      Follow up in about 6 weeks (around 8/5/2025).    The patients questions were answered, they verbalized understanding, and agreed with the treatment plan.       All pertinent data including labs, imaging, EKGs, and studies listed above were reviewed personally.       ALONA LERNER MD  [facility]         [1]   Social History  Tobacco Use    Smoking status: Never    Smokeless tobacco: Never   Substance Use Topics    Alcohol use: Yes     Comment: weekends only    Drug use: No

## 2025-06-24 ENCOUNTER — OFFICE VISIT (OUTPATIENT)
Dept: CARDIOLOGY | Facility: CLINIC | Age: 55
End: 2025-06-24
Payer: COMMERCIAL

## 2025-06-24 VITALS
SYSTOLIC BLOOD PRESSURE: 124 MMHG | HEART RATE: 71 BPM | DIASTOLIC BLOOD PRESSURE: 85 MMHG | OXYGEN SATURATION: 98 % | WEIGHT: 182 LBS | BODY MASS INDEX: 32.24 KG/M2

## 2025-06-24 DIAGNOSIS — K21.9 GASTROESOPHAGEAL REFLUX DISEASE, UNSPECIFIED WHETHER ESOPHAGITIS PRESENT: ICD-10-CM

## 2025-06-24 DIAGNOSIS — F90.9 ATTENTION DEFICIT HYPERACTIVITY DISORDER (ADHD), UNSPECIFIED ADHD TYPE: ICD-10-CM

## 2025-06-24 DIAGNOSIS — E55.9 VITAMIN D DEFICIENCY: ICD-10-CM

## 2025-06-24 DIAGNOSIS — R07.89 CHEST PRESSURE: ICD-10-CM

## 2025-06-24 DIAGNOSIS — E78.2 MIXED HYPERLIPIDEMIA: Primary | ICD-10-CM

## 2025-06-24 DIAGNOSIS — Z82.49 FAMILY HISTORY OF PREMATURE CORONARY HEART DISEASE: ICD-10-CM

## 2025-06-24 DIAGNOSIS — R00.0 TACHYCARDIA: ICD-10-CM

## 2025-06-24 LAB
OHS QRS DURATION: 94 MS
OHS QTC CALCULATION: 429 MS

## 2025-06-24 PROCEDURE — 3074F SYST BP LT 130 MM HG: CPT | Mod: CPTII,S$GLB,, | Performed by: GENERAL PRACTICE

## 2025-06-24 PROCEDURE — 99214 OFFICE O/P EST MOD 30 MIN: CPT | Mod: S$GLB,,, | Performed by: GENERAL PRACTICE

## 2025-06-24 PROCEDURE — 99999 PR PBB SHADOW E&M-EST. PATIENT-LVL IV: CPT | Mod: PBBFAC,,, | Performed by: GENERAL PRACTICE

## 2025-06-24 PROCEDURE — 3079F DIAST BP 80-89 MM HG: CPT | Mod: CPTII,S$GLB,, | Performed by: GENERAL PRACTICE

## 2025-06-24 PROCEDURE — 1159F MED LIST DOCD IN RCRD: CPT | Mod: CPTII,S$GLB,, | Performed by: GENERAL PRACTICE

## 2025-06-24 PROCEDURE — 3008F BODY MASS INDEX DOCD: CPT | Mod: CPTII,S$GLB,, | Performed by: GENERAL PRACTICE

## 2025-06-24 RX ORDER — LISDEXAMFETAMINE DIMESYLATE 50 MG/1
50 CAPSULE ORAL EVERY MORNING
COMMUNITY
Start: 2025-04-30

## 2025-06-24 RX ORDER — BUSPIRONE HYDROCHLORIDE 5 MG/1
5 TABLET ORAL 3 TIMES DAILY
COMMUNITY
Start: 2025-04-30

## 2025-06-24 RX ORDER — METOPROLOL TARTRATE 50 MG/1
25 TABLET ORAL
Qty: 2 TABLET | Refills: 0 | Status: SHIPPED | OUTPATIENT
Start: 2025-06-24

## 2025-08-12 ENCOUNTER — PATIENT OUTREACH (OUTPATIENT)
Dept: ADMINISTRATIVE | Facility: HOSPITAL | Age: 55
End: 2025-08-12
Payer: COMMERCIAL